# Patient Record
Sex: FEMALE | Race: WHITE | NOT HISPANIC OR LATINO | Employment: FULL TIME | URBAN - METROPOLITAN AREA
[De-identification: names, ages, dates, MRNs, and addresses within clinical notes are randomized per-mention and may not be internally consistent; named-entity substitution may affect disease eponyms.]

---

## 2017-01-19 ENCOUNTER — GENERIC CONVERSION - ENCOUNTER (OUTPATIENT)
Dept: OTHER | Facility: OTHER | Age: 32
End: 2017-01-19

## 2017-02-02 ENCOUNTER — GENERIC CONVERSION - ENCOUNTER (OUTPATIENT)
Dept: OTHER | Facility: OTHER | Age: 32
End: 2017-02-02

## 2017-02-06 ENCOUNTER — TRANSCRIBE ORDERS (OUTPATIENT)
Dept: ADMINISTRATIVE | Facility: HOSPITAL | Age: 32
End: 2017-02-06

## 2017-02-06 ENCOUNTER — HOSPITAL ENCOUNTER (OUTPATIENT)
Dept: RADIOLOGY | Facility: HOSPITAL | Age: 32
Discharge: HOME | End: 2017-02-06
Attending: OBSTETRICS & GYNECOLOGY
Payer: COMMERCIAL

## 2017-02-06 DIAGNOSIS — R60.0 EDEMA OF LEFT LOWER EXTREMITY: Primary | ICD-10-CM

## 2017-02-06 DIAGNOSIS — IMO0001 TWINS: ICD-10-CM

## 2017-02-06 DIAGNOSIS — R60.0 EDEMA OF LEFT LOWER EXTREMITY: ICD-10-CM

## 2017-02-06 PROCEDURE — 93971 EXTREMITY STUDY: CPT

## 2017-02-07 ENCOUNTER — GENERIC CONVERSION - ENCOUNTER (OUTPATIENT)
Dept: OTHER | Facility: OTHER | Age: 32
End: 2017-02-07

## 2017-02-13 ENCOUNTER — GENERIC CONVERSION - ENCOUNTER (OUTPATIENT)
Dept: OTHER | Facility: OTHER | Age: 32
End: 2017-02-13

## 2017-02-15 ENCOUNTER — HOSPITAL ENCOUNTER (OUTPATIENT)
Dept: RADIOLOGY | Facility: HOSPITAL | Age: 32
Discharge: HOME | End: 2017-02-15
Attending: OBSTETRICS & GYNECOLOGY
Payer: COMMERCIAL

## 2017-02-15 DIAGNOSIS — IMO0001 TWINS: ICD-10-CM

## 2017-02-15 PROCEDURE — 76810 OB US >/= 14 WKS ADDL FETUS: CPT

## 2017-02-15 PROCEDURE — 76818 FETAL BIOPHYS PROFILE W/NST: CPT

## 2017-02-15 PROCEDURE — 76805 OB US >/= 14 WKS SNGL FETUS: CPT

## 2017-04-04 ENCOUNTER — ANESTHESIA EVENT (OUTPATIENT)
Dept: PERIOP | Facility: AMBULARY SURGERY CENTER | Age: 32
End: 2017-04-04
Payer: COMMERCIAL

## 2017-04-05 ENCOUNTER — ANESTHESIA (OUTPATIENT)
Dept: PERIOP | Facility: AMBULARY SURGERY CENTER | Age: 32
End: 2017-04-05
Payer: COMMERCIAL

## 2017-05-03 ENCOUNTER — HOSPITAL ENCOUNTER (OUTPATIENT)
Facility: AMBULARY SURGERY CENTER | Age: 32
Setting detail: OUTPATIENT SURGERY
Discharge: HOME | End: 2017-05-03
Attending: OBSTETRICS & GYNECOLOGY | Admitting: OBSTETRICS & GYNECOLOGY
Payer: COMMERCIAL

## 2017-05-03 VITALS
SYSTOLIC BLOOD PRESSURE: 115 MMHG | DIASTOLIC BLOOD PRESSURE: 64 MMHG | HEART RATE: 55 BPM | OXYGEN SATURATION: 95 % | TEMPERATURE: 98 F | RESPIRATION RATE: 20 BRPM

## 2017-05-03 RX ORDER — FENTANYL CITRATE 50 UG/ML
INJECTION, SOLUTION INTRAMUSCULAR; INTRAVENOUS AS NEEDED
Status: DISCONTINUED | OUTPATIENT
Start: 2017-05-03 | End: 2017-05-03 | Stop reason: SURG

## 2017-05-03 RX ORDER — PROPOFOL 10 MG/ML
INJECTION, EMULSION INTRAVENOUS AS NEEDED
Status: DISCONTINUED | OUTPATIENT
Start: 2017-05-03 | End: 2017-05-03 | Stop reason: SURG

## 2017-05-03 RX ORDER — LIDOCAINE HYDROCHLORIDE 10 MG/ML
INJECTION, SOLUTION INFILTRATION; PERINEURAL AS NEEDED
Status: DISCONTINUED | OUTPATIENT
Start: 2017-05-03 | End: 2017-05-03 | Stop reason: SURG

## 2017-05-03 RX ORDER — SODIUM CHLORIDE, SODIUM LACTATE, POTASSIUM CHLORIDE, CALCIUM CHLORIDE 600; 310; 30; 20 MG/100ML; MG/100ML; MG/100ML; MG/100ML
125 INJECTION, SOLUTION INTRAVENOUS CONTINUOUS
Status: DISCONTINUED | OUTPATIENT
Start: 2017-05-03 | End: 2017-05-03 | Stop reason: HOSPADM

## 2017-05-03 RX ORDER — FENTANYL CITRATE/PF 50 MCG/ML
25 SYRINGE (ML) INJECTION
Status: CANCELLED | OUTPATIENT
Start: 2017-05-03

## 2017-05-03 RX ORDER — SODIUM CHLORIDE, SODIUM LACTATE, POTASSIUM CHLORIDE, CALCIUM CHLORIDE 600; 310; 30; 20 MG/100ML; MG/100ML; MG/100ML; MG/100ML
125 INJECTION, SOLUTION INTRAVENOUS CONTINUOUS
Status: CANCELLED | OUTPATIENT
Start: 2017-05-03

## 2017-05-03 RX ORDER — ONDANSETRON 2 MG/ML
4 INJECTION INTRAMUSCULAR; INTRAVENOUS ONCE
Status: CANCELLED | OUTPATIENT
Start: 2017-05-03 | End: 2017-05-03

## 2017-05-03 RX ORDER — ONDANSETRON 2 MG/ML
INJECTION INTRAMUSCULAR; INTRAVENOUS AS NEEDED
Status: DISCONTINUED | OUTPATIENT
Start: 2017-05-03 | End: 2017-05-03 | Stop reason: SURG

## 2017-05-03 RX ADMIN — PROPOFOL 200 MG: 10 INJECTION, EMULSION INTRAVENOUS at 09:47

## 2017-05-03 RX ADMIN — FENTANYL CITRATE 100 MCG: 50 INJECTION, SOLUTION INTRAMUSCULAR; INTRAVENOUS at 09:47

## 2017-05-03 RX ADMIN — DEXAMETHASONE SODIUM PHOSPHATE 8 MG: 10 INJECTION INTRAMUSCULAR; INTRAVENOUS at 09:52

## 2017-05-03 RX ADMIN — ONDANSETRON 4 MG: 2 INJECTION INTRAMUSCULAR; INTRAVENOUS at 09:54

## 2017-05-03 RX ADMIN — SODIUM CHLORIDE, SODIUM LACTATE, POTASSIUM CHLORIDE, AND CALCIUM CHLORIDE 125 ML/HR: .6; .31; .03; .02 INJECTION, SOLUTION INTRAVENOUS at 08:58

## 2017-05-03 RX ADMIN — LIDOCAINE HYDROCHLORIDE 50 MG: 10 INJECTION, SOLUTION INFILTRATION; PERINEURAL at 09:47

## 2017-08-07 ENCOUNTER — ALLSCRIPTS OFFICE VISIT (OUTPATIENT)
Dept: OTHER | Facility: OTHER | Age: 32
End: 2017-08-07

## 2017-08-07 DIAGNOSIS — R63.5 ABNORMAL WEIGHT GAIN: ICD-10-CM

## 2017-08-07 DIAGNOSIS — D64.9 ANEMIA: ICD-10-CM

## 2017-08-07 DIAGNOSIS — R53.83 OTHER FATIGUE: ICD-10-CM

## 2017-08-07 DIAGNOSIS — R42 DIZZINESS AND GIDDINESS: ICD-10-CM

## 2017-08-09 ENCOUNTER — GENERIC CONVERSION - ENCOUNTER (OUTPATIENT)
Dept: OTHER | Facility: OTHER | Age: 32
End: 2017-08-09

## 2017-08-09 LAB
DEPRECATED RDW RBC AUTO: 15.6 % (ref 12.3–15.4)
HCT VFR BLD AUTO: 38.6 % (ref 34–46.6)
HGB BLD-MCNC: 12.3 G/DL (ref 11.1–15.9)
MCH RBC QN AUTO: 24.8 PG (ref 26.6–33)
MCHC RBC AUTO-ENTMCNC: 31.9 G/DL (ref 31.5–35.7)
MCV RBC AUTO: 78 FL (ref 79–97)
PLATELET # BLD AUTO: 183 X10E3/UL (ref 150–379)
RBC (HISTORICAL): 4.96 X10E6/UL (ref 3.77–5.28)
WBC # BLD AUTO: 6.4 X10E3/UL (ref 3.4–10.8)

## 2017-08-10 LAB — TSH SERPL DL<=0.05 MIU/L-ACNC: 2.4 UIU/ML (ref 0.45–4.5)

## 2017-08-17 ENCOUNTER — GENERIC CONVERSION - ENCOUNTER (OUTPATIENT)
Dept: OTHER | Facility: OTHER | Age: 32
End: 2017-08-17

## 2017-11-07 ENCOUNTER — HOSPITAL ENCOUNTER (EMERGENCY)
Facility: HOSPITAL | Age: 32
Discharge: HOME | End: 2017-11-07
Attending: EMERGENCY MEDICINE | Admitting: EMERGENCY MEDICINE
Payer: COMMERCIAL

## 2017-11-07 VITALS
RESPIRATION RATE: 16 BRPM | HEIGHT: 66 IN | TEMPERATURE: 98.8 F | WEIGHT: 250 LBS | SYSTOLIC BLOOD PRESSURE: 126 MMHG | HEART RATE: 64 BPM | OXYGEN SATURATION: 97 % | DIASTOLIC BLOOD PRESSURE: 64 MMHG | BODY MASS INDEX: 40.18 KG/M2

## 2017-11-07 DIAGNOSIS — S05.00XA CORNEAL ABRASION: Primary | ICD-10-CM

## 2017-11-07 PROCEDURE — 99283 EMERGENCY DEPT VISIT LOW MDM: CPT

## 2017-11-07 RX ORDER — CIPROFLOXACIN HYDROCHLORIDE 3.5 MG/ML
1 SOLUTION/ DROPS TOPICAL EVERY 4 HOURS
Qty: 2.5 ML | Refills: 0 | Status: SHIPPED | OUTPATIENT
Start: 2017-11-07 | End: 2017-11-14

## 2017-11-07 RX ORDER — KETOROLAC TROMETHAMINE 5 MG/ML
1 SOLUTION OPHTHALMIC 4 TIMES DAILY PRN
Qty: 5 ML | Refills: 0 | Status: SHIPPED | OUTPATIENT
Start: 2017-11-07 | End: 2019-11-26

## 2017-11-07 RX ORDER — TETRACAINE HYDROCHLORIDE 5 MG/ML
2 SOLUTION OPHTHALMIC ONCE
Status: COMPLETED | OUTPATIENT
Start: 2017-11-07 | End: 2017-11-07

## 2017-11-07 RX ADMIN — TETRACAINE HYDROCHLORIDE 2 DROP: 5 SOLUTION OPHTHALMIC at 09:57

## 2017-11-07 RX ADMIN — FLUORESCEIN SODIUM 1 STRIP: 1 STRIP OPHTHALMIC at 09:58

## 2017-11-07 NOTE — DISCHARGE INSTRUCTIONS
Corneal Abrasion   WHAT YOU NEED TO KNOW:   A corneal abrasion is a scratch on the cornea of your eye  The cornea is the clear layer that covers the front of your eye  A small scratch may heal in 1 to 2 days  Deeper or larger scratches may take longer to heal         DISCHARGE INSTRUCTIONS:   Contact your healthcare provider if:   · Your eye pain or vision gets worse  · You have yellow or green drainage from your eye  · You have questions or concerns about your condition or care  Medicines:   · Medicines  may be given in the form of eyedrops or ointment to help prevent an eye infection  You may also be given eye drops to decrease pain  Ask how to take this medicine safely  · Take your medicine as directed  Contact your healthcare provider if you think your medicine is not helping or if you have side effects  Tell him or her if you are allergic to any medicine  Keep a list of the medicines, vitamins, and herbs you take  Include the amounts, and when and why you take them  Bring the list or the pill bottles to follow-up visits  Carry your medicine list with you in case of an emergency  Follow up with your healthcare provider as directed:  Write down your questions so you remember to ask them during your visits  Self-care:   · Do not touch or rub your eye  · Ask your healthcare provider when you can start your normal activities  · Ask your healthcare provider when you can wear your contact lenses  · Wear sunglasses in bright light until your eyes feel better  Help prevent corneal abrasions:   · Remove your contact lenses if your eyes feel dry or irritated  · Wash your hands if you need to touch your eyes or your face  · Trim your child's fingernails so he cannot scratch his eye  · Wear protective eyewear when you work with chemicals, wood, dust, or metal      · Wear protective eyewear when you play sports  · Do not wear your contacts for longer than you should       · Do not wear colored lenses or lenses with shapes on them  These lenses may cause eye damage and vision loss  · Do not wear glitter makeup  Glitter can easily get into your eyes and under contact lenses  · Do not sleep with your contacts  © 2017 [address] [employer] Information is for End User's use only and may not be sold, redistributed or otherwise used for commercial purposes  All illustrations and images included in CareNotes® are the copyrighted property of [employer] , [employer]  or Helder Collado  The above information is an  only  It is not intended as medical advice for individual conditions or treatments  Talk to your doctor, nurse or pharmacist before following any medical regimen to see if it is safe and effective for you

## 2017-11-07 NOTE — ED PROVIDER NOTES
disturbance  Respiratory: Negative for cough, chest tightness, shortness of breath and wheezing  Cardiovascular: Negative for chest pain  Gastrointestinal: Negative for abdominal pain, diarrhea, nausea and vomiting  Genitourinary: Negative  Musculoskeletal: Negative for myalgias and neck stiffness  Skin: Negative for color change, pallor and rash  Neurological: Negative for dizziness, loss of consciousness, weakness, light-headedness, numbness and headaches  Hematological: Negative for adenopathy  Physical Exam  ED Triage Vitals [11/07/17 0806]   Temperature Pulse Respirations Blood Pressure SpO2   98 8 °F (37 1 °C) 64 16 126/64 97 %      Temp Source Heart Rate Source Patient Position - Orthostatic VS BP Location FiO2 (%)   Oral Monitor Sitting Left arm --      Pain Score       7           Orthostatic Vital Signs  Vitals:    11/07/17 0806   BP: 126/64   Pulse: 64   Patient Position - Orthostatic VS: Sitting       Physical Exam   Constitutional: She is oriented to person, place, and time  She appears well-developed and well-nourished  No distress  HENT:   Head: Normocephalic and atraumatic  Right Ear: External ear normal    Left Ear: External ear normal    Nose: Nose normal    Eyes: EOM and lids are normal  Pupils are equal, round, and reactive to light  Lids are everted and swept, no foreign bodies found  No foreign body present in the right eye  No foreign body present in the left eye  Right conjunctiva is not injected  Right conjunctiva has no hemorrhage  Left conjunctiva is injected  Left conjunctiva has no hemorrhage  Slit lamp exam:       The left eye shows corneal abrasion  The left eye shows no corneal ulcer, no foreign body, no hyphema and no anterior chamber bulge  Neck: Normal range of motion  Neck supple  Cardiovascular: Normal rate, regular rhythm, normal heart sounds and intact distal pulses  Exam reveals no friction rub  No murmur heard    Pulmonary/Chest: Effort normal and breath sounds normal  No respiratory distress  She has no wheezes  Lymphadenopathy:     She has no cervical adenopathy  Neurological: She is alert and oriented to person, place, and time  She exhibits normal muscle tone  Coordination normal    Skin: Skin is warm and dry  Capillary refill takes less than 2 seconds  No rash noted  She is not diaphoretic  No pallor  Nursing note and vitals reviewed  ED Medications  Medications   tetracaine 0 5 % ophthalmic solution 2 drop (2 drops Left Eye Given 11/7/17 1263)   fluorescein sodium sterile 1 mg ophthalmic strip 1 strip (1 strip Left Eye Given 11/7/17 5782)       Diagnostic Studies  Results Reviewed     None                 No orders to display              Procedures  Procedures       Phone Contacts  ED Phone Contact    ED Course  ED Course                                MDM  Number of Diagnoses or Management Options  Corneal abrasion:   Diagnosis management comments: Fluorescein staining showed mild corneal abrasion at the 1 o'clock position  The patient was discharged in no acute distress with Cipro eyedrops, ketorolac drops for pain, and instructed to follow up with her PCP if no improvement or return to the ED worsening symptoms develop  Amount and/or Complexity of Data Reviewed  Review and summarize past medical records: yes      CritCare Time    Disposition  Final diagnoses:   Corneal abrasion - Left eye     Time reflects when diagnosis was documented in both MDM as applicable and the Disposition within this note     Time User Action Codes Description Comment    11/7/2017 10:15 AM Monsivaisdelia Santamaria Add [S05 00XA] Corneal abrasion     11/7/2017 10:15 AM Monsivaisdelia Santamaria Modify [S05 00XA] Corneal abrasion Left eye      ED Disposition     ED Disposition Condition Comment    Discharge  [facility] discharge to home/self care      Condition at discharge: Stable        Follow-up Information     Follow up With Specialties Details Why Contact Info Additional [address] Emergency Department Emergency Medicine Go to If symptoms worsen [address] [place] [address]  [phone] [facility] ED, Leanne Madison, Saul hernandes, Malcom Sánchez [address], DO Family Medicine Go to As needed for follow-up [address]   [phone]           Discharge Medication List as of 11/7/2017 10:18 AM      START taking these medications    Details   ciprofloxacin (CILOXAN) 0 3 % ophthalmic solution Administer 1 drop into the left eye every 4 (four) hours for 7 days, Starting Tue 11/7/2017, Until Tue 11/14/2017, Print      ketorolac (ACULAR) 0 5 % ophthalmic solution Administer 1 drop into the left eye 4 (four) times a day as needed (As needed for eye pain) for up to 5 days, Starting Tue 11/7/2017, Until Sun 11/12/2017, Print         CONTINUE these medications which have NOT CHANGED    Details   acetaminophen (TYLENOL) 325 mg tablet Take 650 mg by mouth every 6 (six) hours as needed for mild pain, Until Discontinued, Historical Med           No discharge procedures on file      ED Provider  Electronically Signed by           Melissa Goodwin PA-C  11/07/17 5602

## 2017-12-11 ENCOUNTER — ALLSCRIPTS OFFICE VISIT (OUTPATIENT)
Dept: OTHER | Facility: OTHER | Age: 32
End: 2017-12-11

## 2017-12-13 NOTE — PROGRESS NOTES
Assessment    1  Need for influenza vaccination (V04 81) (Z23)   2  Acute ethmoidal sinusitis (461 2) (J01 20)   3  Viral sinusitis (473 9,118 99) (J32 9,D49 89)    Plan  Need for influenza vaccination    · Stop: Influenza  PMH: Viral URI with cough    · Fluticasone Propionate 50 MCG/ACT Nasal Suspension (Flonase Allergy Relief);instill 1 to 2 sprays into each nostril once daily    Discussion/Summary    2-y/o female presents for evaluation a productive cough, congestion, headache  ethmoidal sinusitis: Likely viral in etiology  Patient has had symptoms for the past 2 weeks  Patient has 5 children were in various stages of viral illnesses at home  Postnasal drip is likely cause of persistent nonproductive cough  Advised symptomatic relief  Patient to stay well hydrated  Recommended Pedialyte  Will order Flonase for congestion  Recommended NyQuil and cough drops for cough relief  Patient can use Tylenol or Motrin p r n  for fever and/or pain/discomfort  Common course of viral sinusitis reviewed with patient in office today  Patient should return to clinic for any worsening of condition  declines influenza vaccine at this time  Possible side effects of new medications were reviewed with the patient/guardian today  The treatment plan was reviewed with the patient/guardian  The patient/guardian understands and agrees with the treatment plan     Self Referrals: No      Chief Complaint  patient presents with a cough that started 2 weeks ago  Chest congestion and headache as well  History of Present Illness  HPI: 32-y/o female presents with 2 weeks of nonproductive cough, congestion, and headache  Patient states she has taken Robitussin without much relief  Patient states she has 5 children at home all with various coughs and cold like symptoms  Review of Systems   Constitutional: no fever-- and-- no chills  ENT: nasal discharge, but-- no sore throat-- and-- no hoarseness    Cardiovascular: no chest pain-- and-- no palpitations  Respiratory: cough, but-- no shortness of breath-- and-- no wheezing  Gastrointestinal: no nausea,-- no vomiting,-- no constipation-- and-- no diarrhea  Neurological: headache  ROS reviewed  Active Problems  1  Acute ethmoidal sinusitis (461 2) (J01 20)   2  Anemia (285 9) (D64 9)   3  Anxiety (300 00) (F41 9)   4  Asthma (493 90) (J45 909)   5  Benign neoplasm of skin of trunk (216 5) (D23 5)   6  BMI 40 0-44 9, adult (V85 41) (Z68 41)   7  Contraceptive management (V25 9) (Z30 9)   8  Dysmenorrhea (625 3) (N94 6)   9  Fatigue (780 79) (R53 83)   10  History of allergy (V15 09) (Z88 9)   11  Morbid or severe obesity due to excess calories (278 01) (E66 01)   12  Screening for depression (V79 0) (Z13 89)   13  Tinea versicolor (111 0) (B36 0)    Past Medical History  1  History of Acute ethmoidal sinusitis (461 2) (J01 20)   2  History of Acute upper respiratory infection (465 9) (J06 9)   3  History of Benign neoplasm of skin of trunk (216 5) (D23 5)   4  History of acute pharyngitis (V12 69) (Z87 09)   5  History of allergic rhinitis (V12 69) (Z87 09)   6  History of vaginitis (V13 29) (Z87 42)   7  History of Laceration Of Foot (892 0)   8  History of Routine Gynecological Exam With Cervical Pap Smear (V72 31)   9  History of Skin tag (701 9) (L91 8)   10  History of Tinea versicolor (111 0) (B36 0)  Active Problems And Past Medical History Reviewed: The active problems and past medical history were reviewed and updated today  Family History  Mother    1  Family history of Diabetes mellitus, undetermined   2  Family history of Heart disease  Father    3  Family history of Aneurysm   4  Family history of COPD (chronic obstructive pulmonary disease)   5  Family history of Kidney disease  Family History Reviewed: The family history was reviewed and updated today         Social History   · Denied: History of Drug use   · Never a smoker   · Non-smoker (V49 89) (Z78 9)  The social history was reviewed and updated today  The social history was reviewed and is unchanged  Surgical History    1  Contraceptive management (V25 9) (Z30 9)   2  History of Tonsillectomy  Surgical History Reviewed: The surgical history was reviewed and updated today  Current Meds   1  Ferrous Sulfate 325 (65 Fe) MG Oral Tablet; TAKE 1 TABLET DAILY WITH FOOD; Therapy: [id number] to (Evaluate:16Mar2018)  Requested for: 92Fim2309; Last Rx:[id number] Ordered   2  Fluticasone Propionate 50 MCG/ACT Nasal Suspension; USE 1 SPRAY IN EACH NOSTRIL DAILY; Therapy: [id number] to (Last Rx:25Mar2015)  Requested for: 25Mar2015 Ordered   3  ProAir  (90 Base) MCG/ACT Inhalation Aerosol Solution; INHALE 1 PUFF EVERY 4 HOURS AS NEEDED  FOR SHORTNESS OF BREATH  Requested for: 18ADH0636; Last Rx:[id number] Ordered    The medication list was reviewed and updated today  Allergies  1  Amoxicillin CAPS  2  No Known Food Allergies   3  No Known Latex Allergies    Vitals   Recorded: 37EBG0771 03:52PM   Temperature 99 2 F   Heart Rate 68   Respiration 16   Systolic 963   Diastolic 82   Height 5 ft 6 in   Weight 261 lb    BMI Calculated 42 13   BSA Calculated 2 24       Physical Exam   Constitutional  General appearance: No acute distress, well appearing and well nourished  Eyes  Conjunctiva and lids: No swelling, erythema or discharge  Ears, Nose, Mouth, and Throat  External inspection of ears and nose: Normal    Nasal mucosa, septum, and turbinates: Abnormal   There was clear rhinorrhea from both nares  The bilateral nasal mucosa was boggy-- and-- red  Oropharynx: Normal with no erythema, edema, exudate or lesions  -- Postnasal drip  Pulmonary  Respiratory effort: No increased work of breathing or signs of respiratory distress  Auscultation of lungs: Clear to auscultation  Cardiovascular  Palpation of heart: Normal PMI, no thrills  Lymphatic  Palpation of lymph nodes in neck: No lymphadenopathy     Psychiatric Orientation to person, place, and time: Normal    Mood and affect: Normal    Additional Exam:  Sinus: Tenderness to palpation along the ethmoid sinus bilaterally  Attending Note  Attending Note Edu Rubio: Attending Note: I discussed the case with the Resident and reviewed the Resident's note  Level of Participation: I was present in clinic, but did not examine the patient  Signatures   Electronically signed by : Addison Sarah MD; Dec 11 2017  8:42PM EST                       (Author)    Electronically signed by :  FABIANA Archer ; Dec 12 2017  8:52AM EST                       (Author)

## 2018-01-14 VITALS
HEART RATE: 74 BPM | TEMPERATURE: 98.2 F | HEIGHT: 65 IN | SYSTOLIC BLOOD PRESSURE: 126 MMHG | OXYGEN SATURATION: 99 % | DIASTOLIC BLOOD PRESSURE: 78 MMHG | BODY MASS INDEX: 41.66 KG/M2 | RESPIRATION RATE: 20 BRPM | WEIGHT: 250.06 LBS

## 2018-01-17 ENCOUNTER — GENERIC CONVERSION - ENCOUNTER (OUTPATIENT)
Dept: OTHER | Facility: OTHER | Age: 33
End: 2018-01-17

## 2018-01-18 NOTE — RESULT NOTES
Discussion/Summary  Called patient to discuss results  WIll get iron panel and retic count and patient will  paper  WIll send over ferrous sulfate  Agrees with plan and will follow up in 1-2 months  Verified Results  (1) CBC/ PLT (NO DIFF) [id number] 07:48AM Shavon Lopez     Test Name Result Flag Reference   WBC 6 4 x10E3/uL  3 4-10 8   RBC 4 96 x10E6/uL  3 77-5 28   Hemoglobin 12 3 g/dL  11 1-15 9   Hematocrit 38 6 %  34 0-46  6   MCV 78 fL L 79-97   MCH 24 8 pg L 26 6-33 0   MCHC 31 9 g/dL  31 5-35 7   RDW 15 6 % H 12 3-15 4   Platelets 769 L64O3/DX  150-379     (LC) TSH Rfx on Abnormal to Free T4 [id number] 07:48AM Shavon John     Test Name Result Flag Reference   TSH 2 400 uIU/mL  0 450-4 500       Signatures   Electronically signed by : Matt Michelle MD; Aug 17 2017  8:39AM EST                       (Author)

## 2018-01-22 VITALS
SYSTOLIC BLOOD PRESSURE: 120 MMHG | HEIGHT: 66 IN | DIASTOLIC BLOOD PRESSURE: 82 MMHG | WEIGHT: 261 LBS | RESPIRATION RATE: 16 BRPM | TEMPERATURE: 99.2 F | HEART RATE: 68 BPM | BODY MASS INDEX: 41.95 KG/M2

## 2018-01-23 NOTE — MISCELLANEOUS
Message  Return to work or school:   Yakov Cota is under my professional care   She was seen in my office on 12/11/2017       Dr Sofia Ray MD         Signatures   Electronically signed by : Angie Corrigan MD; Dec 15 2017  9:27AM EST                       (Author)

## 2018-01-24 VITALS
SYSTOLIC BLOOD PRESSURE: 110 MMHG | HEIGHT: 66 IN | TEMPERATURE: 98.9 F | HEART RATE: 84 BPM | OXYGEN SATURATION: 98 % | DIASTOLIC BLOOD PRESSURE: 70 MMHG | RESPIRATION RATE: 18 BRPM

## 2018-04-05 DIAGNOSIS — J30.9 ALLERGIC RHINITIS, UNSPECIFIED SEASONALITY, UNSPECIFIED TRIGGER: Primary | ICD-10-CM

## 2018-04-05 RX ORDER — FLUTICASONE PROPIONATE 50 MCG
1-2 SPRAY, SUSPENSION (ML) NASAL DAILY
COMMUNITY
Start: 2017-12-11 | End: 2018-04-05 | Stop reason: SDUPTHER

## 2018-04-10 RX ORDER — FLUTICASONE PROPIONATE 50 MCG
1-2 SPRAY, SUSPENSION (ML) NASAL DAILY
Qty: 16 G | Refills: 5 | Status: SHIPPED | OUTPATIENT
Start: 2018-04-10 | End: 2018-12-17

## 2018-04-11 ENCOUNTER — OFFICE VISIT (OUTPATIENT)
Dept: FAMILY MEDICINE CLINIC | Facility: CLINIC | Age: 33
End: 2018-04-11
Payer: COMMERCIAL

## 2018-04-11 VITALS
HEIGHT: 66 IN | BODY MASS INDEX: 42.91 KG/M2 | OXYGEN SATURATION: 100 % | SYSTOLIC BLOOD PRESSURE: 112 MMHG | WEIGHT: 267 LBS | RESPIRATION RATE: 18 BRPM | TEMPERATURE: 98.6 F | HEART RATE: 83 BPM | DIASTOLIC BLOOD PRESSURE: 74 MMHG

## 2018-04-11 DIAGNOSIS — D22.9 MULTIPLE BENIGN NEVI: ICD-10-CM

## 2018-04-11 DIAGNOSIS — M77.8 LEFT WRIST TENDONITIS: ICD-10-CM

## 2018-04-11 DIAGNOSIS — R60.9 PERIPHERAL EDEMA: Primary | ICD-10-CM

## 2018-04-11 PROCEDURE — 3008F BODY MASS INDEX DOCD: CPT | Performed by: FAMILY MEDICINE

## 2018-04-11 PROCEDURE — 99213 OFFICE O/P EST LOW 20 MIN: CPT | Performed by: FAMILY MEDICINE

## 2018-04-11 PROCEDURE — 1036F TOBACCO NON-USER: CPT | Performed by: FAMILY MEDICINE

## 2018-04-11 RX ORDER — HYDROCHLOROTHIAZIDE 12.5 MG/1
12.5 TABLET ORAL DAILY
Qty: 30 TABLET | Refills: 5 | Status: SHIPPED | OUTPATIENT
Start: 2018-04-11 | End: 2019-04-03

## 2018-04-11 RX ORDER — FERROUS SULFATE 325(65) MG
325 TABLET ORAL
COMMUNITY
End: 2019-08-19 | Stop reason: ALTCHOICE

## 2018-04-11 NOTE — PATIENT INSTRUCTIONS
Patient was told that her  nevi are benign there is no treatment needed at this time  The patient was prescribed hydrochlorothiazide 12 5 mg to be taken daily for peripheral edema  The patient will continue to avoid salt in her diet and has limited soda also  The patient was advised to try over-the-counter Advil or Aleve for her left wrist tendinitis  Patient will follow up with me with any continued problems   All

## 2018-04-11 NOTE — PROGRESS NOTES
Assessment/Plan:    No problem-specific Assessment & Plan notes found for this encounter  Diagnoses and all orders for this visit:    Peripheral edema  -     hydrochlorothiazide (HYDRODIURIL) 12 5 mg tablet; Take 1 tablet (12 5 mg total) by mouth daily    Left wrist tendonitis    Multiple benign nevi    Other orders  -     ferrous sulfate 325 (65 Fe) mg tablet; Take 325 mg by mouth daily with breakfast        Subjective:      Patient ID: Chhaya Gerardo is a 27 y o  female      HPI    The following portions of the patient's history were reviewed and updated as appropriate: allergies, current medications, past family history, past medical history, past social history, past surgical history and problem list     Review of Systems      Objective:      /74 (BP Location: Right arm, Patient Position: Sitting, Cuff Size: Extra-Large)   Pulse 83   Temp 98 6 °F (37 °C) (Tympanic)   Resp 18   Ht 5' 6" (1 676 m)   Wt 121 kg (267 lb)   SpO2 100%   BMI 43 09 kg/m²          Physical Exam

## 2018-04-11 NOTE — PROGRESS NOTES
Assessment/Plan:    No problem-specific Assessment & Plan notes found for this encounter  Diagnoses and all orders for this visit:    Peripheral edema  -     hydrochlorothiazide (HYDRODIURIL) 12 5 mg tablet; Take 1 tablet (12 5 mg total) by mouth daily    Left wrist tendonitis    Multiple benign nevi    Other orders  -     ferrous sulfate 325 (65 Fe) mg tablet; Take 325 mg by mouth daily with breakfast        Subjective:      Patient ID: Seven White is a 27 y o  female  This is a 27-year-old female complaints of left wrist pain  Pain gets worse with lifting  Patient also has several nevi that she would like to be checked  Patient also states that she has swelling in her ankles  The patient states she is trying to lose weight and has cut back on her salt and stops drinking soda  She denies any other complaints today      Wrist Pain              Review of Systems   Constitutional: Negative  Respiratory: Negative  Cardiovascular: Positive for leg swelling (All)  Musculoskeletal:        Patient has left wrist pain proximal to her thumb  Psychiatric/Behavioral: Negative  Objective:      /74 (BP Location: Right arm, Patient Position: Sitting, Cuff Size: Extra-Large)   Pulse 83   Temp 98 6 °F (37 °C) (Tympanic)   Resp 18   Ht 5' 6" (1 676 m)   Wt 121 kg (267 lb)   SpO2 100%   BMI 43 09 kg/m²          Physical Exam   Constitutional: She appears well-developed and well-nourished  Patient is morbidly obese with a BMI 43 09   Cardiovascular: Normal rate and regular rhythm  Pulmonary/Chest: Effort normal and breath sounds normal    Musculoskeletal:   Patient has tenderness proximal to her left home in the snuffbox area but not painful to palpation  tonight  There is no swelling in the proximal thumb area   Skin:   Patient has multiple benign-appearing nevi 1 on the top of her head and otherwise on her right shoulder    There are multiple other nevi scattered throughout her trunk  All the nevi are benign   Vitals reviewed

## 2018-12-09 DIAGNOSIS — I10 ESSENTIAL HYPERTENSION: Primary | ICD-10-CM

## 2018-12-09 RX ORDER — HYDROCHLOROTHIAZIDE 12.5 MG/1
CAPSULE, GELATIN COATED ORAL
Qty: 30 CAPSULE | Refills: 5 | Status: SHIPPED | OUTPATIENT
Start: 2018-12-09 | End: 2019-04-03

## 2018-12-17 ENCOUNTER — OFFICE VISIT (OUTPATIENT)
Dept: FAMILY MEDICINE CLINIC | Facility: CLINIC | Age: 33
End: 2018-12-17
Payer: COMMERCIAL

## 2018-12-17 VITALS
WEIGHT: 243 LBS | TEMPERATURE: 98.6 F | BODY MASS INDEX: 39.22 KG/M2 | DIASTOLIC BLOOD PRESSURE: 90 MMHG | RESPIRATION RATE: 18 BRPM | OXYGEN SATURATION: 98 % | HEART RATE: 92 BPM | SYSTOLIC BLOOD PRESSURE: 130 MMHG

## 2018-12-17 DIAGNOSIS — J01.40 ACUTE NON-RECURRENT PANSINUSITIS: Primary | ICD-10-CM

## 2018-12-17 PROCEDURE — 99213 OFFICE O/P EST LOW 20 MIN: CPT | Performed by: NURSE PRACTITIONER

## 2018-12-17 PROCEDURE — 1036F TOBACCO NON-USER: CPT | Performed by: NURSE PRACTITIONER

## 2018-12-17 RX ORDER — FLUTICASONE PROPIONATE 50 MCG
2 SPRAY, SUSPENSION (ML) NASAL DAILY
Qty: 16 G | Refills: 0 | Status: SHIPPED | OUTPATIENT
Start: 2018-12-17 | End: 2020-06-30

## 2018-12-17 RX ORDER — AZITHROMYCIN 250 MG/1
TABLET, FILM COATED ORAL
Qty: 6 TABLET | Refills: 0 | Status: SHIPPED | OUTPATIENT
Start: 2018-12-17 | End: 2018-12-21

## 2018-12-17 NOTE — PROGRESS NOTES
Assessment/Plan: 1 take medications as rx  2  F/u if condition changes/worsens         Diagnoses and all orders for this visit:    Acute non-recurrent pansinusitis  -     fluticasone (FLONASE) 50 mcg/act nasal spray; 2 sprays into each nostril daily  -     azithromycin (ZITHROMAX) 250 mg tablet; Take 2 tablets today then 1 tablet daily x 4 days          Subjective:      Patient ID: Carlee Villar is a 26 y o  female  27yo F presents with sinus pressure for 21 month  Reports a lot of congestion  Sinus headache  Denies fever  Took OTC  Not helping        The following portions of the patient's history were reviewed and updated as appropriate: allergies and current medications  Review of Systems   Constitutional: Negative  HENT: Positive for congestion, sinus pain and sinus pressure  Respiratory: Negative  Cardiovascular: Negative  Objective:      /90 (BP Location: Left arm, Patient Position: Sitting, Cuff Size: Large)   Pulse 92   Temp 98 6 °F (37 °C) (Tympanic)   Resp 18   Wt 110 kg (243 lb)   SpO2 98%   BMI 39 22 kg/m²          Physical Exam   Constitutional: She appears well-developed and well-nourished  HENT:   Head: Normocephalic and atraumatic  Right Ear: External ear normal    Left Ear: External ear normal    Nose: Right sinus exhibits maxillary sinus tenderness and frontal sinus tenderness  Left sinus exhibits maxillary sinus tenderness and frontal sinus tenderness  Mouth/Throat: Oropharynx is clear and moist    Cardiovascular: Normal rate, regular rhythm and normal heart sounds      Pulmonary/Chest: Effort normal and breath sounds normal

## 2019-01-18 ENCOUNTER — CLINICAL SUPPORT (OUTPATIENT)
Dept: FAMILY MEDICINE CLINIC | Facility: CLINIC | Age: 34
End: 2019-01-18
Payer: COMMERCIAL

## 2019-01-18 DIAGNOSIS — Z11.1 ENCOUNTER FOR PPD TEST: Primary | ICD-10-CM

## 2019-01-18 PROCEDURE — 86580 TB INTRADERMAL TEST: CPT | Performed by: FAMILY MEDICINE

## 2019-01-21 ENCOUNTER — CLINICAL SUPPORT (OUTPATIENT)
Dept: FAMILY MEDICINE CLINIC | Facility: CLINIC | Age: 34
End: 2019-01-21
Payer: COMMERCIAL

## 2019-01-21 DIAGNOSIS — Z11.1 ENCOUNTER FOR PPD SKIN TEST READING: Primary | ICD-10-CM

## 2019-01-21 LAB
INDURATION: NORMAL MM
TB SKIN TEST: NEGATIVE

## 2019-01-21 PROCEDURE — 99214 OFFICE O/P EST MOD 30 MIN: CPT | Performed by: FAMILY MEDICINE

## 2019-03-06 ENCOUNTER — OFFICE VISIT (OUTPATIENT)
Dept: FAMILY MEDICINE CLINIC | Facility: CLINIC | Age: 34
End: 2019-03-06
Payer: COMMERCIAL

## 2019-03-06 VITALS
DIASTOLIC BLOOD PRESSURE: 82 MMHG | HEIGHT: 66 IN | WEIGHT: 273 LBS | RESPIRATION RATE: 18 BRPM | BODY MASS INDEX: 43.87 KG/M2 | SYSTOLIC BLOOD PRESSURE: 126 MMHG | HEART RATE: 88 BPM

## 2019-03-06 DIAGNOSIS — F41.9 ANXIETY: Primary | ICD-10-CM

## 2019-03-06 DIAGNOSIS — R07.89 ATYPICAL CHEST PAIN: ICD-10-CM

## 2019-03-06 DIAGNOSIS — E66.01 CLASS 3 SEVERE OBESITY DUE TO EXCESS CALORIES WITHOUT SERIOUS COMORBIDITY WITH BODY MASS INDEX (BMI) OF 40.0 TO 44.9 IN ADULT (HCC): ICD-10-CM

## 2019-03-06 PROCEDURE — 3008F BODY MASS INDEX DOCD: CPT | Performed by: FAMILY MEDICINE

## 2019-03-06 PROCEDURE — 99214 OFFICE O/P EST MOD 30 MIN: CPT | Performed by: FAMILY MEDICINE

## 2019-03-07 NOTE — PROGRESS NOTES
Assessment/Plan:    No problem-specific Assessment & Plan notes found for this encounter  Diagnoses and all orders for this visit:    Anxiety    Class 3 severe obesity due to excess calories without serious comorbidity with body mass index (BMI) of 40 0 to 44 9 in adult [facility])  -     Lipid panel; Future  -     Hemoglobin A1C; Future  -     TSH, 3rd generation; Future  -     Lipid panel  -     Hemoglobin A1C  -     TSH, 3rd generation    Atypical chest pain  -     Comprehensive metabolic panel; Future  -     Comprehensive metabolic panel        Subjective:      Patient ID: Marisol MOREIRA is a 26 y o  female  This is a 58-year-old morbidly obese white female who states recent atypical chest pain associated with she thinks is stress  The patient states there is a strained relationship between her and her stepdaughter  The patient denies shortness of breath associated with the atypical chest pain  The patient is also concerned about her weight and would like a referral to dietitian to help with her weight loss  She denies any other complaints  The following portions of the patient's history were reviewed and updated as appropriate: allergies, current medications, past family history, past medical history, past social history, past surgical history and problem list     Review of Systems   Constitutional: Positive for unexpected weight change  Respiratory: Negative  Cardiovascular: Negative  Atypical chest pain that comes on at times of stress  Gastrointestinal: Negative  Endocrine: Negative  Neurological: Negative  Psychiatric/Behavioral: Negative  Objective:      /82   Pulse 88   Resp 18   Ht 5' 6" (1 676 m)   Wt 124 kg (273 lb)   BMI 44 06 kg/m²          Physical Exam   Constitutional: She is oriented to person, place, and time  She appears well-developed and well-nourished     The patient is morbidly obese with a BMI of 44 06   Cardiovascular: Normal rate, regular rhythm, normal heart sounds and intact distal pulses  Pulmonary/Chest: Effort normal and breath sounds normal    Musculoskeletal: Normal range of motion  Neurological: She is alert and oriented to person, place, and time  Skin: Skin is warm  Capillary refill takes less than 2 seconds  Psychiatric: She has a normal mood and affect   Her behavior is normal  Judgment and thought content normal

## 2019-03-07 NOTE — PATIENT INSTRUCTIONS
The patient was diagnosed with situational anxiety which may be responsible for her atypical chest pain  The patient is morbidly obese and screening blood work was ordered for the patient to include a lipid panel, hemoglobin A1c, and a TSH  The patient is a good candidate for referral to a dietitian  I will call the patient with blood work results once they are done

## 2019-03-10 LAB
ALBUMIN SERPL-MCNC: 4.1 G/DL (ref 3.5–5.5)
ALBUMIN/GLOB SERPL: 1.7 {RATIO} (ref 1.2–2.2)
ALP SERPL-CCNC: 69 IU/L (ref 39–117)
ALT SERPL-CCNC: 56 IU/L (ref 0–32)
AST SERPL-CCNC: 16 IU/L (ref 0–40)
BILIRUB SERPL-MCNC: 0.3 MG/DL (ref 0–1.2)
BUN SERPL-MCNC: 11 MG/DL (ref 6–20)
BUN/CREAT SERPL: 14 (ref 9–23)
CALCIUM SERPL-MCNC: 9 MG/DL (ref 8.7–10.2)
CHLORIDE SERPL-SCNC: 104 MMOL/L (ref 96–106)
CHOLEST SERPL-MCNC: 130 MG/DL (ref 100–199)
CHOLEST/HDLC SERPL: 3.2 RATIO (ref 0–4.4)
CO2 SERPL-SCNC: 23 MMOL/L (ref 20–29)
CREAT SERPL-MCNC: 0.81 MG/DL (ref 0.57–1)
EST. AVERAGE GLUCOSE BLD GHB EST-MCNC: 100 MG/DL
GLOBULIN SER-MCNC: 2.4 G/DL (ref 1.5–4.5)
GLUCOSE SERPL-MCNC: 98 MG/DL (ref 65–99)
HBA1C MFR BLD: 5.1 % (ref 4.8–5.6)
HDLC SERPL-MCNC: 41 MG/DL
LDLC SERPL CALC-MCNC: 70 MG/DL (ref 0–99)
POTASSIUM SERPL-SCNC: 4.1 MMOL/L (ref 3.5–5.2)
PROT SERPL-MCNC: 6.5 G/DL (ref 6–8.5)
SL AMB EGFR AFRICAN AMERICAN: 109 ML/MIN/1.73
SL AMB EGFR NON AFRICAN AMERICAN: 94 ML/MIN/1.73
SL AMB VLDL CHOLESTEROL CALC: 19 MG/DL (ref 5–40)
SODIUM SERPL-SCNC: 139 MMOL/L (ref 134–144)
TRIGL SERPL-MCNC: 97 MG/DL (ref 0–149)
TSH SERPL DL<=0.005 MIU/L-ACNC: 2.92 UIU/ML (ref 0.45–4.5)

## 2019-03-10 PROCEDURE — 3044F HG A1C LEVEL LT 7.0%: CPT | Performed by: NURSE PRACTITIONER

## 2019-04-03 ENCOUNTER — OFFICE VISIT (OUTPATIENT)
Dept: FAMILY MEDICINE CLINIC | Facility: CLINIC | Age: 34
End: 2019-04-03
Payer: COMMERCIAL

## 2019-04-03 VITALS
HEART RATE: 63 BPM | OXYGEN SATURATION: 99 % | BODY MASS INDEX: 43.39 KG/M2 | SYSTOLIC BLOOD PRESSURE: 122 MMHG | WEIGHT: 270 LBS | HEIGHT: 66 IN | DIASTOLIC BLOOD PRESSURE: 92 MMHG

## 2019-04-03 DIAGNOSIS — F41.9 ANXIETY: ICD-10-CM

## 2019-04-03 DIAGNOSIS — Z23 INFLUENZA VACCINE ADMINISTERED: ICD-10-CM

## 2019-04-03 DIAGNOSIS — R60.9 PERIPHERAL EDEMA: Primary | ICD-10-CM

## 2019-04-03 DIAGNOSIS — E66.01 CLASS 3 SEVERE OBESITY DUE TO EXCESS CALORIES WITHOUT SERIOUS COMORBIDITY WITH BODY MASS INDEX (BMI) OF 40.0 TO 44.9 IN ADULT (HCC): ICD-10-CM

## 2019-04-03 PROCEDURE — 90471 IMMUNIZATION ADMIN: CPT

## 2019-04-03 PROCEDURE — 3008F BODY MASS INDEX DOCD: CPT | Performed by: FAMILY MEDICINE

## 2019-04-03 PROCEDURE — 99214 OFFICE O/P EST MOD 30 MIN: CPT | Performed by: FAMILY MEDICINE

## 2019-04-03 PROCEDURE — 90686 IIV4 VACC NO PRSV 0.5 ML IM: CPT

## 2019-04-03 RX ORDER — ESCITALOPRAM OXALATE 10 MG/1
10 TABLET ORAL DAILY
Qty: 30 TABLET | Refills: 5 | Status: SHIPPED | OUTPATIENT
Start: 2019-04-03 | End: 2019-09-28 | Stop reason: SDUPTHER

## 2019-04-03 RX ORDER — HYDROCHLOROTHIAZIDE 25 MG/1
25 TABLET ORAL DAILY
Qty: 30 TABLET | Refills: 5 | Status: SHIPPED | OUTPATIENT
Start: 2019-04-03 | End: 2019-09-28 | Stop reason: SDUPTHER

## 2019-04-09 ENCOUNTER — CONSULT (OUTPATIENT)
Dept: BARIATRICS | Facility: CLINIC | Age: 34
End: 2019-04-09
Payer: COMMERCIAL

## 2019-04-09 VITALS
BODY MASS INDEX: 43.75 KG/M2 | HEIGHT: 66 IN | TEMPERATURE: 99.8 F | HEART RATE: 82 BPM | SYSTOLIC BLOOD PRESSURE: 122 MMHG | RESPIRATION RATE: 16 BRPM | DIASTOLIC BLOOD PRESSURE: 74 MMHG | WEIGHT: 272.25 LBS

## 2019-04-09 DIAGNOSIS — E66.01 CLASS 3 SEVERE OBESITY WITHOUT SERIOUS COMORBIDITY WITH BODY MASS INDEX (BMI) OF 40.0 TO 44.9 IN ADULT, UNSPECIFIED OBESITY TYPE (HCC): Primary | ICD-10-CM

## 2019-04-09 DIAGNOSIS — R60.9 PERIPHERAL EDEMA: ICD-10-CM

## 2019-04-09 DIAGNOSIS — F41.9 ANXIETY: ICD-10-CM

## 2019-04-09 PROBLEM — E28.2 PCOS (POLYCYSTIC OVARIAN SYNDROME): Status: ACTIVE | Noted: 2019-04-09

## 2019-04-09 PROCEDURE — 99244 OFF/OP CNSLTJ NEW/EST MOD 40: CPT | Performed by: PHYSICIAN ASSISTANT

## 2019-04-24 ENCOUNTER — CLINICAL SUPPORT (OUTPATIENT)
Dept: BARIATRICS | Facility: CLINIC | Age: 34
End: 2019-04-24

## 2019-04-24 VITALS
HEART RATE: 61 BPM | SYSTOLIC BLOOD PRESSURE: 118 MMHG | WEIGHT: 271.4 LBS | DIASTOLIC BLOOD PRESSURE: 78 MMHG | TEMPERATURE: 99.6 F | BODY MASS INDEX: 43.62 KG/M2 | HEIGHT: 66 IN | RESPIRATION RATE: 16 BRPM

## 2019-04-24 DIAGNOSIS — E66.01 MORBID OBESITY (HCC): Primary | ICD-10-CM

## 2019-04-24 DIAGNOSIS — E66.01 OBESITY, CLASS III, BMI 40-49.9 (MORBID OBESITY) (HCC): Primary | ICD-10-CM

## 2019-04-24 PROCEDURE — RECHECK

## 2019-05-01 ENCOUNTER — OFFICE VISIT (OUTPATIENT)
Dept: FAMILY MEDICINE CLINIC | Facility: CLINIC | Age: 34
End: 2019-05-01
Payer: COMMERCIAL

## 2019-05-01 VITALS
WEIGHT: 274 LBS | HEART RATE: 87 BPM | SYSTOLIC BLOOD PRESSURE: 122 MMHG | HEIGHT: 66 IN | OXYGEN SATURATION: 100 % | DIASTOLIC BLOOD PRESSURE: 82 MMHG | BODY MASS INDEX: 44.03 KG/M2 | RESPIRATION RATE: 18 BRPM | TEMPERATURE: 97.4 F

## 2019-05-01 DIAGNOSIS — E66.01 CLASS 3 SEVERE OBESITY DUE TO EXCESS CALORIES WITHOUT SERIOUS COMORBIDITY WITH BODY MASS INDEX (BMI) OF 40.0 TO 44.9 IN ADULT (HCC): ICD-10-CM

## 2019-05-01 DIAGNOSIS — F41.9 ANXIETY: ICD-10-CM

## 2019-05-01 DIAGNOSIS — R60.9 PERIPHERAL EDEMA: Primary | ICD-10-CM

## 2019-05-01 PROCEDURE — 99214 OFFICE O/P EST MOD 30 MIN: CPT | Performed by: FAMILY MEDICINE

## 2019-05-07 ENCOUNTER — TELEPHONE (OUTPATIENT)
Dept: CARDIOLOGY CLINIC | Facility: CLINIC | Age: 34
End: 2019-05-07

## 2019-05-22 ENCOUNTER — TELEPHONE (OUTPATIENT)
Dept: BARIATRICS | Facility: CLINIC | Age: 34
End: 2019-05-22

## 2019-05-22 ENCOUNTER — OFFICE VISIT (OUTPATIENT)
Dept: BARIATRICS | Facility: CLINIC | Age: 34
End: 2019-05-22
Payer: COMMERCIAL

## 2019-05-22 VITALS
WEIGHT: 273.25 LBS | BODY MASS INDEX: 43.91 KG/M2 | HEART RATE: 68 BPM | SYSTOLIC BLOOD PRESSURE: 120 MMHG | TEMPERATURE: 98.7 F | RESPIRATION RATE: 16 BRPM | HEIGHT: 66 IN | DIASTOLIC BLOOD PRESSURE: 78 MMHG

## 2019-05-22 DIAGNOSIS — E66.01 CLASS 3 SEVERE OBESITY WITHOUT SERIOUS COMORBIDITY WITH BODY MASS INDEX (BMI) OF 40.0 TO 44.9 IN ADULT, UNSPECIFIED OBESITY TYPE (HCC): Primary | ICD-10-CM

## 2019-05-22 DIAGNOSIS — Z01.818 PREOPERATIVE CLEARANCE: ICD-10-CM

## 2019-05-22 DIAGNOSIS — F41.9 ANXIETY: ICD-10-CM

## 2019-05-22 DIAGNOSIS — R60.9 PERIPHERAL EDEMA: ICD-10-CM

## 2019-05-22 PROCEDURE — 99214 OFFICE O/P EST MOD 30 MIN: CPT | Performed by: PHYSICIAN ASSISTANT

## 2019-06-10 ENCOUNTER — CONSULT (OUTPATIENT)
Dept: CARDIOLOGY CLINIC | Facility: CLINIC | Age: 34
End: 2019-06-10
Payer: COMMERCIAL

## 2019-06-10 VITALS
BODY MASS INDEX: 44.68 KG/M2 | HEIGHT: 66 IN | SYSTOLIC BLOOD PRESSURE: 112 MMHG | HEART RATE: 61 BPM | WEIGHT: 278 LBS | OXYGEN SATURATION: 98 % | DIASTOLIC BLOOD PRESSURE: 72 MMHG

## 2019-06-10 DIAGNOSIS — E66.01 MORBID OBESITY (HCC): ICD-10-CM

## 2019-06-10 DIAGNOSIS — E66.01 CLASS 3 SEVERE OBESITY WITHOUT SERIOUS COMORBIDITY WITH BODY MASS INDEX (BMI) OF 40.0 TO 44.9 IN ADULT, UNSPECIFIED OBESITY TYPE (HCC): ICD-10-CM

## 2019-06-10 DIAGNOSIS — Z01.810 PREOPERATIVE CARDIOVASCULAR EXAMINATION: Primary | ICD-10-CM

## 2019-06-10 DIAGNOSIS — R60.9 PERIPHERAL EDEMA: ICD-10-CM

## 2019-06-10 PROCEDURE — 93000 ELECTROCARDIOGRAM COMPLETE: CPT | Performed by: INTERNAL MEDICINE

## 2019-06-10 PROCEDURE — 99243 OFF/OP CNSLTJ NEW/EST LOW 30: CPT | Performed by: INTERNAL MEDICINE

## 2019-06-12 DIAGNOSIS — J01.40 ACUTE NON-RECURRENT PANSINUSITIS: ICD-10-CM

## 2019-06-12 RX ORDER — FLUTICASONE PROPIONATE 50 MCG
SPRAY, SUSPENSION (ML) NASAL
Qty: 16 G | Refills: 0 | OUTPATIENT
Start: 2019-06-12

## 2019-06-19 ENCOUNTER — OFFICE VISIT (OUTPATIENT)
Dept: BARIATRICS | Facility: CLINIC | Age: 34
End: 2019-06-19
Payer: COMMERCIAL

## 2019-06-19 VITALS
HEIGHT: 66 IN | DIASTOLIC BLOOD PRESSURE: 70 MMHG | RESPIRATION RATE: 14 BRPM | WEIGHT: 272.2 LBS | SYSTOLIC BLOOD PRESSURE: 106 MMHG | HEART RATE: 63 BPM | BODY MASS INDEX: 43.75 KG/M2 | TEMPERATURE: 98 F

## 2019-06-19 DIAGNOSIS — E66.01 CLASS 3 SEVERE OBESITY WITHOUT SERIOUS COMORBIDITY WITH BODY MASS INDEX (BMI) OF 40.0 TO 44.9 IN ADULT, UNSPECIFIED OBESITY TYPE (HCC): Primary | ICD-10-CM

## 2019-06-19 DIAGNOSIS — R60.9 PERIPHERAL EDEMA: ICD-10-CM

## 2019-06-19 DIAGNOSIS — F41.9 ANXIETY: ICD-10-CM

## 2019-06-19 PROCEDURE — 99214 OFFICE O/P EST MOD 30 MIN: CPT | Performed by: PHYSICIAN ASSISTANT

## 2019-07-16 ENCOUNTER — OFFICE VISIT (OUTPATIENT)
Dept: BARIATRICS | Facility: CLINIC | Age: 34
End: 2019-07-16

## 2019-07-16 VITALS — HEIGHT: 66 IN | BODY MASS INDEX: 44.07 KG/M2 | WEIGHT: 274.2 LBS

## 2019-07-16 DIAGNOSIS — Z98.84 BARIATRIC SURGERY STATUS: ICD-10-CM

## 2019-07-16 DIAGNOSIS — E66.01 MORBID (SEVERE) OBESITY DUE TO EXCESS CALORIES (HCC): Primary | ICD-10-CM

## 2019-07-16 PROCEDURE — RECHECK

## 2019-07-16 NOTE — PROGRESS NOTES
Bariatric Follow Up Nutrition Note    Preop  6 Month Program (3 of 6)    Type of surgery  Preop  Surgery Date: TBD  Surgeon: Dr Amber Barrera  37 y o   female  Height 5' 6" (1 676 m), weight 124 kg (274 lb 3 2 oz), not currently breastfeeding      Weight in (lb) to have BMI = 25: 155 6  Pre-Op Excess Wt: 118 6lb  Net Weight change:  +2 8lbs since initial eval  Goal weight to submit:  264 6lbs    Review of History and Medications   Past Medical History:   Diagnosis Date    Allergic rhinitis     Anemia     ok currently    Anxiety     Asthma     activity induced    Benign neoplasm of skin of trunk     Polycystic ovary syndrome     Skin tag     Tinea versicolor      Past Surgical History:   Procedure Laterality Date    CERVICAL CERCLAGE       SECTION  2017    TONSILLECTOMY      TUBAL LIGATION       Social History     Socioeconomic History    Marital status:      Spouse name: Not on file    Number of children: Not on file    Years of education: Not on file    Highest education level: Not on file   Occupational History    Not on file   Social Needs    Financial resource strain: Not on file    Food insecurity:     Worry: Not on file     Inability: Not on file    Transportation needs:     Medical: Not on file     Non-medical: Not on file   Tobacco Use    Smoking status: Never Smoker    Smokeless tobacco: Never Used   Substance and Sexual Activity    Alcohol use: Yes     Comment: rarely    Drug use: No    Sexual activity: Not on file   Lifestyle    Physical activity:     Days per week: Not on file     Minutes per session: Not on file    Stress: Not on file   Relationships    Social connections:     Talks on phone: Not on file     Gets together: Not on file     Attends Judaism service: Not on file     Active member of club or organization: Not on file     Attends meetings of clubs or organizations: Not on file     Relationship status: Not on file    Intimate partner violence:     Fear of current or ex partner: Not on file     Emotionally abused: Not on file     Physically abused: Not on file     Forced sexual activity: Not on file   Other Topics Concern    Not on file   Social History Narrative    Not on file       Current Outpatient Medications:     acetaminophen (TYLENOL) 325 mg tablet, Take 650 mg by mouth every 6 (six) hours as needed for mild pain, Disp: , Rfl:     escitalopram (LEXAPRO) 10 mg tablet, Take 1 tablet (10 mg total) by mouth daily, Disp: 30 tablet, Rfl: 5    ferrous sulfate 325 (65 Fe) mg tablet, Take 325 mg by mouth daily with breakfast, Disp: , Rfl:     fluticasone (FLONASE) 50 mcg/act nasal spray, 2 sprays into each nostril daily (Patient taking differently: 2 sprays into each nostril as needed ), Disp: 16 g, Rfl: 0    hydrochlorothiazide (HYDRODIURIL) 25 mg tablet, Take 1 tablet (25 mg total) by mouth daily, Disp: 30 tablet, Rfl: 5    ketorolac (ACULAR) 0 5 % ophthalmic solution, Administer 1 drop into the left eye 4 (four) times a day as needed (As needed for eye pain) for up to 5 days, Disp: 5 mL, Rfl: 0    Food Intake and Lifestyle Assessment   Food Intake Assessment completed via usual diet recall  Breakfast: skips or bagel with cream cheese  Snack: -   Lunch: fast food or skips, starting to do more salads this week ie:  Tomatoes, olives, pickles over lettuce  Snack: -  Dinner: if skipped breakfast and lunch will be very hungry therefore over eats  Will make chicken in the crockpot and have with butter noodles, doesn't like veggies    When has pasta will have a smaller portion  Snack: not often  Beverage intake: water and diet soda  Diet texture/stage: regular  Protein supplement: none, but did suggest to use as a meal replacement for breakfast  Estimated protein intake per day: 50-60gm  Estimated fluid intake per day: 64oz water per day, very occasional will have a diet soda  Meals eaten away from home: working on decreasing the fast food at lunch  Typical meal pattern: 1-3 meals per day and 0-1 snacks per day  Eating Behaviors: Consumption of high calorie/ high fat foods, Large portion sizes and Mindless eating    Food allergies or intolerances: none  Cultural or Adventist considerations: none    Physical Assessment    Physical Activity  Types of exercise: None, just extra walking in her daily activity  Current physical limitations: none    Psychosocial Assessment   Support systems: spouse and children  Socioeconomic factors: none    Nutrition Diagnosis  Diagnosis: Overweight / Obesity (NC-3 3)  Related to: Physical inactivity and Excessive energy intake  As Evidenced by: BMI >25     Interventions and Teaching   Patient educated on post-op nutrition guidelines  Patient educated and handouts provided  Protein supplements  Meal planning and preparation  Intuitive eating  Techniques for self monitoring and keeping daily food journal    Education provided to: patient    Barriers to learning: No barriers identified    Readiness to change: preparation    Comprehension: verbalizes understanding     Expected Compliance: good    Workflow:  still needs sleep appt/study, meet with surgeon/EDG, support group, 3 more weight checks, pre-op weight loss      Goals  Food journal  Exercise 30 minutes 5 times per week (can split to 15 mins 2x/day)  Complete lession plans 1-6  Eat 3 meals per day with protein at each meal  Eliminate mindless snacking  Replace breakfast with a protien shake     Time Spent:   30 Minutes

## 2019-08-16 ENCOUNTER — OFFICE VISIT (OUTPATIENT)
Dept: BARIATRICS | Facility: CLINIC | Age: 34
End: 2019-08-16

## 2019-08-16 VITALS — WEIGHT: 268.6 LBS | BODY MASS INDEX: 43.17 KG/M2 | HEIGHT: 66 IN

## 2019-08-16 DIAGNOSIS — Z98.84 BARIATRIC SURGERY STATUS: ICD-10-CM

## 2019-08-16 DIAGNOSIS — E66.01 MORBID (SEVERE) OBESITY DUE TO EXCESS CALORIES (HCC): Primary | ICD-10-CM

## 2019-08-16 PROCEDURE — RECHECK

## 2019-08-16 NOTE — PROGRESS NOTES
Bariatric Follow Up Nutrition Note    Preop  6 Month Program (4 of 6)    Type of surgery  Preop  Surgery Date: TBD  Surgeon: Dr Tiffany Mario  37 y o   female  Height 5' 6" (1 676 m), weight 122 kg (268 lb 9 6 oz), not currently breastfeeding  Body mass index is 43 35 kg/m²      Weight in (lb) to have BMI = 25: 155 6  Pre-Op Excess Wt: 118 6lb  Net Weight change:  -5 6lbs x 1 month  Goal weight to submit:  264 6lbs    Review of History and Medications   Past Medical History:   Diagnosis Date    Allergic rhinitis     Anemia     ok currently    Anxiety     Asthma     activity induced    Benign neoplasm of skin of trunk     Polycystic ovary syndrome     Skin tag     Tinea versicolor      Past Surgical History:   Procedure Laterality Date    CERVICAL CERCLAGE       SECTION  2017    TONSILLECTOMY      TUBAL LIGATION       Social History     Socioeconomic History    Marital status:      Spouse name: Not on file    Number of children: Not on file    Years of education: Not on file    Highest education level: Not on file   Occupational History    Not on file   Social Needs    Financial resource strain: Not on file    Food insecurity:     Worry: Not on file     Inability: Not on file    Transportation needs:     Medical: Not on file     Non-medical: Not on file   Tobacco Use    Smoking status: Never Smoker    Smokeless tobacco: Never Used   Substance and Sexual Activity    Alcohol use: Yes     Comment: rarely    Drug use: No    Sexual activity: Not on file   Lifestyle    Physical activity:     Days per week: Not on file     Minutes per session: Not on file    Stress: Not on file   Relationships    Social connections:     Talks on phone: Not on file     Gets together: Not on file     Attends Sikhism service: Not on file     Active member of club or organization: Not on file     Attends meetings of clubs or organizations: Not on file Relationship status: Not on file    Intimate partner violence:     Fear of current or ex partner: Not on file     Emotionally abused: Not on file     Physically abused: Not on file     Forced sexual activity: Not on file   Other Topics Concern    Not on file   Social History Narrative    Not on file       Current Outpatient Medications:     acetaminophen (TYLENOL) 325 mg tablet, Take 650 mg by mouth every 6 (six) hours as needed for mild pain, Disp: , Rfl:     escitalopram (LEXAPRO) 10 mg tablet, Take 1 tablet (10 mg total) by mouth daily, Disp: 30 tablet, Rfl: 5    ferrous sulfate 325 (65 Fe) mg tablet, Take 325 mg by mouth daily with breakfast, Disp: , Rfl:     fluticasone (FLONASE) 50 mcg/act nasal spray, 2 sprays into each nostril daily (Patient taking differently: 2 sprays into each nostril as needed ), Disp: 16 g, Rfl: 0    hydrochlorothiazide (HYDRODIURIL) 25 mg tablet, Take 1 tablet (25 mg total) by mouth daily, Disp: 30 tablet, Rfl: 5    ketorolac (ACULAR) 0 5 % ophthalmic solution, Administer 1 drop into the left eye 4 (four) times a day as needed (As needed for eye pain) for up to 5 days, Disp: 5 mL, Rfl: 0    Food Intake and Lifestyle Assessment   Food Intake Assessment completed via [employer] kayode (logged many meals, but not every day)  Breakfast: protein shake sometimes with PB crackers  Snack: -   Lunch: still doing some fast food, but making better choices, mostly ordering smaller portions ie:   Only a double cheese burger or a grilled chicken sandwich with a small krishna or will bring hard boiled eggs from home (4)  Snack: -  Dinner: 3oz chicken and 1 cup of butter noodles (doesn't like many veggies, but is slowly trying them) or chicken tenders or homemade mac & cheese and 1 chicken leg or mac & cheese and two hot dogs  Snack: -  Beverage intake: water and diet soda (1 per day)  Diet texture/stage: regular  Protein supplement: having for breakfast  Estimated protein intake per day: 50-90gm  Estimated fluid intake per day: 64oz water per day, one diet soda per day  Meals eaten away from home: working on decreasing the fast food at lunch, now making better choices and watching portions  Typical meal pattern: 3 meals per day and 0-1 snacks per day  Eating Behaviors: Consumption of high calorie/ high fat foods, Large portion sizes and eating out often    Food allergies or intolerances: none  Cultural or Buddhism considerations: none    Physical Assessment    Physical Activity  Types of exercise: No set exercise regimen  Meeting 10,000 steps per day  Current physical limitations: none    Psychosocial Assessment   Support systems: spouse and children  Socioeconomic factors: none    Nutrition Diagnosis  Diagnosis: Overweight / Obesity (NC-3 3)  Related to: Physical inactivity and Excessive energy intake  As Evidenced by: BMI >25     Interventions and Teaching   Patient educated on post-op nutrition guidelines  Patient educated and handouts provided      Meal planning and preparation  Intuitive eating  Techniques for self monitoring and keeping daily food journal  Measuring portions    Education provided to: patient    Barriers to learning: No barriers identified    Readiness to change: preparation    Comprehension: verbalizes understanding     Expected Compliance: good    Evaluation / Monitoring  Eating pattern as discussed Body weight Physical activity       6 months of weight checks (4 of 6 today)   Labs:  going for rest of blood work on 8/17 (CBC and CMP)   Sleep:  scheduled for 10/8   Cardio:  6/10/19 cleared   Meet w/surgeon:  scheduled for 9/6-Les   EGD   Support Group:  gave updated schedule   Weight loss: 5% = -13 57lbs (257 83lbs)1/2 to submit to insurance = -6 79lbs (264 6lbs)BMI 40 to qualify if not dx with BERENICE = 248lbs  BMI 35 to qualify if does have BERENICE = 217lbs        Goals  Food journal via [employer]  10,000 steps per day  Complete lession plans 1-6  Eat 3 meals per day with protein at each meal  Continue to avoid mindless snacking  Continue to replace breakfast with a protien shake  Work on 30/60 rule     Time Spent:   30 Minutes

## 2019-08-18 ENCOUNTER — OFFICE VISIT (OUTPATIENT)
Dept: URGENT CARE | Facility: CLINIC | Age: 34
End: 2019-08-18
Payer: COMMERCIAL

## 2019-08-18 VITALS
RESPIRATION RATE: 18 BRPM | TEMPERATURE: 99.7 F | SYSTOLIC BLOOD PRESSURE: 121 MMHG | HEART RATE: 82 BPM | BODY MASS INDEX: 43.23 KG/M2 | DIASTOLIC BLOOD PRESSURE: 81 MMHG | HEIGHT: 66 IN | WEIGHT: 269 LBS | OXYGEN SATURATION: 98 %

## 2019-08-18 DIAGNOSIS — B00.2 HERPES VIRUS INFECTION OF ORAL MUCOSA: Primary | ICD-10-CM

## 2019-08-18 PROCEDURE — 99213 OFFICE O/P EST LOW 20 MIN: CPT | Performed by: FAMILY MEDICINE

## 2019-08-18 RX ORDER — VALACYCLOVIR HYDROCHLORIDE 1 G/1
1000 TABLET, FILM COATED ORAL 2 TIMES DAILY
Qty: 14 TABLET | Refills: 0 | Status: SHIPPED | OUTPATIENT
Start: 2019-08-18 | End: 2019-08-19

## 2019-08-18 NOTE — PROGRESS NOTES
[facility]        NAME: Jose Raul Martínez is a 37 y o  female  : 1985    MRN: [id number]  DATE: 2019  TIME: 11:18 AM    Assessment and Plan   Herpes virus infection of oral mucosa [B00 9]  1  Herpes virus infection of oral mucosa  al mag oxide-diphenhydramine-lidocaine viscous (MAGIC MOUTHWASH) 1:1:1 suspension    valACYclovir (VALTREX) 1,000 mg tablet     Symptoms concerning for intraoral manifestations of herpes simplex virus  Patient started on a one-week course of Valtrex and given magic mouthwash for symptomatic relief  Good hand hygiene encouraged  Patient Instructions     Follow up with PCP in 3-5 days  Proceed to  ER if symptoms worsen  Chief Complaint     Chief Complaint   Patient presents with    Mouth Lesions     Sore inside mouth beginning this AM  One sore on bottom lip for 2 days  Pt has never had issues with sores in the past     Sinusitis     Sinus congestion, body aches, feeling feverish for approx 1 week  No OTC meds taken         History of Present Illness       78-year-old female presents today with oral lesions and pain which has persisted for 1 week  Has had a cold sore on the lower lip which has just started to scab  Reports that she woke up this morning with pain and ulcerations within the mouth associated with gum swelling and throat pain  Has never experienced this before  Of note her  recently had cold sores and she suspects getting infected  Has never had cold sores before or a unknown herpes infection  Review of Systems   Review of Systems   Constitutional: Positive for diaphoresis and fever (100 7)  Negative for chills  HENT: Positive for dental problem and trouble swallowing  Respiratory: Negative for cough and shortness of breath            Current Medications       Current Outpatient Medications:     acetaminophen (TYLENOL) 325 mg tablet, Take 650 mg by mouth every 6 (six) hours as needed for mild pain, Disp: , Rfl:    escitalopram (LEXAPRO) 10 mg tablet, Take 1 tablet (10 mg total) by mouth daily, Disp: 30 tablet, Rfl: 5    fluticasone (FLONASE) 50 mcg/act nasal spray, 2 sprays into each nostril daily (Patient taking differently: 2 sprays into each nostril as needed ), Disp: 16 g, Rfl: 0    hydrochlorothiazide (HYDRODIURIL) 25 mg tablet, Take 1 tablet (25 mg total) by mouth daily, Disp: 30 tablet, Rfl: 5    al mag oxide-diphenhydramine-lidocaine viscous (MAGIC MOUTHWASH) 1:1:1 suspension, Swish and spit 10 mL every 4 (four) hours as needed for mouth pain or discomfort, Disp: 1 Bottle, Rfl: 0    ferrous sulfate 325 (65 Fe) mg tablet, Take 325 mg by mouth daily with breakfast, Disp: , Rfl:     ketorolac (ACULAR) 0 5 % ophthalmic solution, Administer 1 drop into the left eye 4 (four) times a day as needed (As needed for eye pain) for up to 5 days, Disp: 5 mL, Rfl: 0    valACYclovir (VALTREX) 1,000 mg tablet, Take 1 tablet (1,000 mg total) by mouth 2 (two) times a day for 7 days, Disp: 14 tablet, Rfl: 0    Current Allergies     Allergies as of 2019 - Reviewed 2019   Allergen Reaction Noted    Amoxicillin  2018            The following portions of the patient's history were reviewed and updated as appropriate: allergies, current medications, past family history, past medical history, past social history, past surgical history and problem list      Past Medical History:   Diagnosis Date    Allergic rhinitis     Anemia     ok currently    Anxiety     Asthma     activity induced    Benign neoplasm of skin of trunk     Polycystic ovary syndrome     Skin tag     Tinea versicolor        Past Surgical History:   Procedure Laterality Date    CERVICAL CERCLAGE       SECTION  2017    TONSILLECTOMY      TUBAL LIGATION         Family History   Problem Relation Age of Onset    Diabetes Mother     Heart disease Mother         CHF    Hypertension Mother     Cancer Father         kidney    Aneurysm Father         AAA    COPD Father     Kidney disease Father     No Known Problems Sister     Cancer Maternal Grandmother         bladder    Cancer Paternal Grandfather         brain    Stroke Paternal Grandfather     No Known Problems Sister          Medications have been verified  Objective   /81 (BP Location: Left arm, Patient Position: Sitting, Cuff Size: Large)   Pulse 82   Temp 99 7 °F (37 6 °C) (Tympanic)   Resp 18   Ht 5' 6" (1 676 m)   Wt 122 kg (269 lb)   SpO2 98%   BMI 43 42 kg/m²        Physical Exam     Physical Exam   Constitutional: She is oriented to person, place, and time  She appears well-developed and well-nourished  She appears distressed  HENT:   Head: Normocephalic and atraumatic  Small white ulcerations within the buccal mucosa  Darkened and edematous gums  Dried brown flaking beneath lower lip on the outside  Eyes: Conjunctivae are normal    Pulmonary/Chest: Effort normal    Neurological: She is alert and oriented to person, place, and time  Skin: Skin is warm  She is not diaphoretic  No erythema  Psychiatric: She has a normal mood and affect  Her behavior is normal  Judgment and thought content normal    Nursing note and vitals reviewed

## 2019-08-19 ENCOUNTER — OFFICE VISIT (OUTPATIENT)
Dept: FAMILY MEDICINE CLINIC | Facility: CLINIC | Age: 34
End: 2019-08-19
Payer: COMMERCIAL

## 2019-08-19 VITALS
WEIGHT: 267 LBS | HEART RATE: 65 BPM | BODY MASS INDEX: 43.09 KG/M2 | RESPIRATION RATE: 18 BRPM | OXYGEN SATURATION: 99 % | SYSTOLIC BLOOD PRESSURE: 118 MMHG | TEMPERATURE: 98.9 F | DIASTOLIC BLOOD PRESSURE: 76 MMHG

## 2019-08-19 DIAGNOSIS — T50.905A ADVERSE EFFECT OF DRUG, INITIAL ENCOUNTER: ICD-10-CM

## 2019-08-19 DIAGNOSIS — J01.40 ACUTE NON-RECURRENT PANSINUSITIS: Primary | ICD-10-CM

## 2019-08-19 PROCEDURE — 99213 OFFICE O/P EST LOW 20 MIN: CPT | Performed by: NURSE PRACTITIONER

## 2019-08-19 RX ORDER — METHYLPREDNISOLONE 4 MG/1
TABLET ORAL
Qty: 21 EACH | Refills: 0 | Status: SHIPPED | OUTPATIENT
Start: 2019-08-19 | End: 2020-06-24 | Stop reason: ALTCHOICE

## 2019-08-19 RX ORDER — SULFAMETHOXAZOLE AND TRIMETHOPRIM 800; 160 MG/1; MG/1
1 TABLET ORAL EVERY 12 HOURS SCHEDULED
Qty: 6 TABLET | Refills: 0 | Status: SHIPPED | OUTPATIENT
Start: 2019-08-19 | End: 2019-08-22

## 2019-08-19 NOTE — LETTER
August 19, 2019     Patient: Nai Livingston   YOB: 1985   Date of Visit: 8/19/2019       To Whom it May Concern:    Kasia Hurtado is under my professional care  She was seen in my office on 8/19/2019  She may return to work tomorrow 8/20/19  If you have any questions or concerns, please don't hesitate to call           Sincerely,          Kenny Yang NP        CC: No Recipients

## 2019-08-19 NOTE — PROGRESS NOTES
Assessment/Plan:  1  Facial swelling may be due to reaction to Valtrex  Advised patient to stop taking the Valtrex immediately and start Benadryl  2  Take medication as prescribed  3  Follow up on Friday for recheck     Diagnoses and all orders for this visit:    Acute non-recurrent pansinusitis  -     sulfamethoxazole-trimethoprim (BACTRIM DS) 800-160 mg per tablet; Take 1 tablet by mouth every 12 (twelve) hours for 3 days    Adverse effect of drug, initial encounter  -     methylPREDNISolone 4 MG tablet therapy pack; Use as directed on package          Subjective:      Patient ID: Nai Livingston is a 37 y o  female  A 27-year-old female presents with facial swelling for 1 day  Reports she was seen in the urgent care yesterday and started on Valtrex  Reports she has a cold sore  Reports that is resolving  She also reports that she started with some sinus pressure and pain approximately 1 and half weeks ago  Denies fever  Denies any cough/wheeze  Denies any oral swelling  The following portions of the patient's history were reviewed and updated as appropriate: allergies and current medications  Review of Systems   Constitutional: Negative  HENT: Positive for congestion, sinus pressure and sinus pain  Respiratory: Negative  Cardiovascular: Negative  Skin: Positive for rash  Objective:      /76   Pulse 65   Temp 98 9 °F (37 2 °C)   Resp 18   Wt 121 kg (267 lb)   SpO2 99%   BMI 43 09 kg/m²          Physical Exam   Constitutional: She appears well-developed and well-nourished  HENT:   Head: Normocephalic and atraumatic  Right Ear: Tympanic membrane and external ear normal    Left Ear: Tympanic membrane and external ear normal    Nose: Right sinus exhibits maxillary sinus tenderness and frontal sinus tenderness  Left sinus exhibits maxillary sinus tenderness and frontal sinus tenderness     Mouth/Throat: Oropharynx is clear and moist    Small healing blister on lower lip

## 2019-09-05 ENCOUNTER — TELEPHONE (OUTPATIENT)
Dept: BARIATRICS | Facility: CLINIC | Age: 34
End: 2019-09-05

## 2019-09-06 ENCOUNTER — OFFICE VISIT (OUTPATIENT)
Dept: BARIATRICS | Facility: CLINIC | Age: 34
End: 2019-09-06

## 2019-09-06 VITALS — HEIGHT: 66 IN | WEIGHT: 264.4 LBS | BODY MASS INDEX: 42.49 KG/M2

## 2019-09-06 DIAGNOSIS — Z98.84 BARIATRIC SURGERY STATUS: ICD-10-CM

## 2019-09-06 DIAGNOSIS — E66.01 MORBID (SEVERE) OBESITY DUE TO EXCESS CALORIES (HCC): Primary | ICD-10-CM

## 2019-09-06 PROCEDURE — RECHECK

## 2019-09-06 NOTE — PROGRESS NOTES
Bariatric Follow Up Nutrition Note    Preop  6 Month Program (5 of 6)    Type of surgery  Preop  Surgery Date: TBD  Surgeon: Dr Tiffany Mario  37 y o   female  Ht:  77"  Wt:  264 4lbs  BMI 42 7lbs    Weight in (lb) to have BMI = 25: 155 6  Pre-Op Excess Wt: 108 8lb  Weight change:  -4 2lbs x 1 month  Goal weight to submit:  264 6lbs (met goal today)    Review of History and Medications   Past Medical History:   Diagnosis Date    Allergic rhinitis     Anemia     ok currently    Anxiety     Asthma     activity induced    Benign neoplasm of skin of trunk     Polycystic ovary syndrome     Skin tag     Tinea versicolor      Past Surgical History:   Procedure Laterality Date    CERVICAL CERCLAGE       SECTION  2017    TONSILLECTOMY      TUBAL LIGATION       Social History     Socioeconomic History    Marital status:      Spouse name: Not on file    Number of children: Not on file    Years of education: Not on file    Highest education level: Not on file   Occupational History    Not on file   Social Needs    Financial resource strain: Not on file    Food insecurity:     Worry: Not on file     Inability: Not on file    Transportation needs:     Medical: Not on file     Non-medical: Not on file   Tobacco Use    Smoking status: Never Smoker    Smokeless tobacco: Never Used   Substance and Sexual Activity    Alcohol use: Yes     Comment: rarely    Drug use: No    Sexual activity: Yes   Lifestyle    Physical activity:     Days per week: Not on file     Minutes per session: Not on file    Stress: Not on file   Relationships    Social connections:     Talks on phone: Not on file     Gets together: Not on file     Attends Anglican service: Not on file     Active member of club or organization: Not on file     Attends meetings of clubs or organizations: Not on file     Relationship status: Not on file    Intimate partner violence:     Fear of current or ex partner: Not on file     Emotionally abused: Not on file     Physically abused: Not on file     Forced sexual activity: Not on file   Other Topics Concern    Not on file   Social History Narrative    Not on file       Current Outpatient Medications:     acetaminophen (TYLENOL) 325 mg tablet, Take 650 mg by mouth every 6 (six) hours as needed for mild pain, Disp: , Rfl:     al mag oxide-diphenhydramine-lidocaine viscous (MAGIC MOUTHWASH) 1:1:1 suspension, Swish and spit 10 mL every 4 (four) hours as needed for mouth pain or discomfort, Disp: 1 Bottle, Rfl: 0    escitalopram (LEXAPRO) 10 mg tablet, Take 1 tablet (10 mg total) by mouth daily, Disp: 30 tablet, Rfl: 5    fluticasone (FLONASE) 50 mcg/act nasal spray, 2 sprays into each nostril daily (Patient taking differently: 2 sprays into each nostril as needed ), Disp: 16 g, Rfl: 0    hydrochlorothiazide (HYDRODIURIL) 25 mg tablet, Take 1 tablet (25 mg total) by mouth daily, Disp: 30 tablet, Rfl: 5    ketorolac (ACULAR) 0 5 % ophthalmic solution, Administer 1 drop into the left eye 4 (four) times a day as needed (As needed for eye pain) for up to 5 days, Disp: 5 mL, Rfl: 0    methylPREDNISolone 4 MG tablet therapy pack, Use as directed on package, Disp: 21 each, Rfl: 0    Food Intake and Lifestyle Assessment   Food Intake Assessment completed via "[employer] kayode (logged many meals, but not every day)  Breakfast: protein shake only  Snack: -   Lunch: bigger meal is lunch now since started night school  PBJ or leftover or eggs (2-3) ---if does hard boiled will not eat the yolk or 2 small lakhwinder slices  Snack: -  Dinner: last night, beef hot dog    Other options would be:  3oz chicken and 1 cup of butter noodles (doesn't like many veggies, but is slowly trying them) or chicken tenders or homemade mac & cheese and 1 chicken leg or mac & cheese and two hot dogs  Snack: -  Beverage intake: water and diet soda (1 per day, which is down from 64oz/day)  Diet texture/stage: regular  Protein supplement: having for breakfast  Estimated protein intake per day: 50-90gm  Estimated fluid intake per day: 64oz water per day, one diet soda per day  Meals eaten away from home: working on decreasing the fast food at lunch, now making better choices and watching portions  Typical meal pattern: 3 meals per day and 0-1 snacks per day  Eating Behaviors: Consumption of high calorie/ high fat foods, Large portion sizes and eating out often    Food allergies or intolerances: dislikes yogurt and veggies, even after trying multiple times  Cultural or Hindu considerations: none    Physical Assessment    Physical Activity  Wakes up at 6am and on the go all day with kids to school, work and now she started night school 4 nights per week and 1 online class which she normally does on the weekend  Types of exercise: No set exercise regimen  Meeting 10,000 steps per day  Current physical limitations: none    Psychosocial Assessment   Support systems: spouse and children  Socioeconomic factors: none    Nutrition Diagnosis  Diagnosis: Overweight / Obesity (NC-3 3)  Related to: Physical inactivity and Excessive energy intake  As Evidenced by: BMI >25     Interventions and Teaching   Patient educated on post-op nutrition guidelines  Patient educated and handouts provided      Meal planning and preparation  Intuitive eating  Techniques for self monitoring and keeping daily food journal  Measuring portions    Education provided to: patient    Barriers to learning: No barriers identified    Readiness to change: preparation    Comprehension: verbalizes understanding     Expected Compliance: good    Evaluation / Monitoring  Eating pattern as discussed Body weight Physical activity       6 months of weight checks (5 of 6 today)   Labs: plans on going for CBC and CMP tomorrow   Sleep:  scheduled for 10/8   Cardio:  6/10/19 cleared   Meet w/surgeon:    EGD   Support Group:  gave updated schedule   Weight loss: 5% = -13 57lbs (257 83lbs)1/2 to submit to insurance = -6 79lbs (264 6lbs)BMI 40 to qualify if not dx with BERENICE = 248lbs  BMI 35 to qualify if does have BERENICE = 217lbs   Met submit weight goal today         Goals  Food journal via [employer]  10,000 steps per day  Complete lession plans 1-6  Eat 3 meals per day with protein at each meal  Continue to avoid mindless snacking  Continue to replace breakfast with a protien shake  continue with the 30/60 rule     Time Spent:   30 Minutes

## 2019-09-28 DIAGNOSIS — F41.9 ANXIETY: ICD-10-CM

## 2019-09-28 DIAGNOSIS — R60.9 PERIPHERAL EDEMA: ICD-10-CM

## 2019-09-29 RX ORDER — ESCITALOPRAM OXALATE 10 MG/1
10 TABLET ORAL DAILY
Qty: 30 TABLET | Refills: 5 | Status: SHIPPED | OUTPATIENT
Start: 2019-09-29 | End: 2020-06-30

## 2019-09-29 RX ORDER — HYDROCHLOROTHIAZIDE 25 MG/1
25 TABLET ORAL DAILY
Qty: 30 TABLET | Refills: 5 | Status: SHIPPED | OUTPATIENT
Start: 2019-09-29 | End: 2020-04-13

## 2019-10-09 ENCOUNTER — OFFICE VISIT (OUTPATIENT)
Dept: BARIATRICS | Facility: CLINIC | Age: 34
End: 2019-10-09

## 2019-10-09 VITALS — WEIGHT: 264.8 LBS | HEIGHT: 66 IN | BODY MASS INDEX: 42.56 KG/M2

## 2019-10-09 DIAGNOSIS — Z98.84 BARIATRIC SURGERY STATUS: ICD-10-CM

## 2019-10-09 DIAGNOSIS — E66.01 MORBID (SEVERE) OBESITY DUE TO EXCESS CALORIES (HCC): Primary | ICD-10-CM

## 2019-10-09 PROCEDURE — RECHECK

## 2019-10-09 NOTE — PROGRESS NOTES
Bariatric Follow Up Nutrition Note    Preop  6 Month Program (6 of 6)    Type of surgery  Preop  Surgery Date: TBD  Surgeon: Dr Roxie Perez  37 y o   female  Ht:  77"  Wt:  264 4lbs  BMI 42 7lbs    Weight in (lb) to have BMI = 25: 155 6  Pre-Op Excess Wt: 108 8lb  Weight change: maintained  Goal weight to submit:  264 6lbs (met goal in Sept and maintained weight this month)    Review of History and Medications   Past Medical History:   Diagnosis Date    Allergic rhinitis     Anemia     ok currently    Anxiety     Asthma     activity induced    Benign neoplasm of skin of trunk     Polycystic ovary syndrome     Skin tag     Tinea versicolor      Past Surgical History:   Procedure Laterality Date    CERVICAL CERCLAGE       SECTION  2017    TONSILLECTOMY      TUBAL LIGATION       Social History     Socioeconomic History    Marital status:      Spouse name: Not on file    Number of children: Not on file    Years of education: Not on file    Highest education level: Not on file   Occupational History    Not on file   Social Needs    Financial resource strain: Not on file    Food insecurity:     Worry: Not on file     Inability: Not on file    Transportation needs:     Medical: Not on file     Non-medical: Not on file   Tobacco Use    Smoking status: Never Smoker    Smokeless tobacco: Never Used   Substance and Sexual Activity    Alcohol use: Yes     Comment: rarely    Drug use: No    Sexual activity: Yes   Lifestyle    Physical activity:     Days per week: Not on file     Minutes per session: Not on file    Stress: Not on file   Relationships    Social connections:     Talks on phone: Not on file     Gets together: Not on file     Attends Sikhism service: Not on file     Active member of club or organization: Not on file     Attends meetings of clubs or organizations: Not on file     Relationship status: Not on file    Intimate partner violence:     Fear of current or ex partner: Not on file     Emotionally abused: Not on file     Physically abused: Not on file     Forced sexual activity: Not on file   Other Topics Concern    Not on file   Social History Narrative    Not on file       Current Outpatient Medications:     acetaminophen (TYLENOL) 325 mg tablet, Take 650 mg by mouth every 6 (six) hours as needed for mild pain, Disp: , Rfl:     al mag oxide-diphenhydramine-lidocaine viscous (MAGIC MOUTHWASH) 1:1:1 suspension, Swish and spit 10 mL every 4 (four) hours as needed for mouth pain or discomfort, Disp: 1 Bottle, Rfl: 0    escitalopram (LEXAPRO) 10 mg tablet, TAKE 1 TABLET (10 MG TOTAL) BY MOUTH DAILY, Disp: 30 tablet, Rfl: 5    fluticasone (FLONASE) 50 mcg/act nasal spray, 2 sprays into each nostril daily (Patient taking differently: 2 sprays into each nostril as needed ), Disp: 16 g, Rfl: 0    hydrochlorothiazide (HYDRODIURIL) 25 mg tablet, TAKE 1 TABLET (25 MG TOTAL) BY MOUTH DAILY, Disp: 30 tablet, Rfl: 5    ketorolac (ACULAR) 0 5 % ophthalmic solution, Administer 1 drop into the left eye 4 (four) times a day as needed (As needed for eye pain) for up to 5 days, Disp: 5 mL, Rfl: 0    methylPREDNISolone 4 MG tablet therapy pack, Use as directed on package, Disp: 21 each, Rfl: 0    Food Intake and Lifestyle Assessment   Food Intake Assessment completed via [employer] kayode (logged many meals, but not every day)  Breakfast: on way to work between 7:30 and 8am:  protein shake only  Snack: gets hungry, but tries not to have anything  advised can have a small snack  Provided with list and she marked off snacks she would like  Lunch: 12pm:  bigger meal is lunch now since started night school  PBJ or leftover or eggs (2-3) ---if does hard boiled will not eat the yolk or 2 small lakhwinder slices  PBJ and hard boiled eggs  Snack: -  Dinner: 5pm:  last night, pizza    Other options would be:  3oz chicken and 1 cup of butter noodles (doesn't like many veggies, but is slowly trying them) or chicken tenders or homemade mac & cheese and 1 chicken leg or mac & cheese and two hot dogs or crockpot meals  Snack: -  Beverage intake: water and diet soda (1 per day, which is down from 64oz/day)  Diet texture/stage: regular  Protein supplement: having for breakfast  Estimated protein intake per day: 50-90gm  Estimated fluid intake per day: 64oz water per day, one diet soda per day  Meals eaten away from home: was doing well with avoiding the fast food, until this week  This week they ordered out often  She plans on going back to brining her lunch with her to work  Overall, her biggest changes has been watching portions  Typical meal pattern: 3 meals per day and 0-1 snacks per day  Eating Behaviors: Consumption of high calorie/ high fat foods, Large portion sizes and eating out often    Food allergies or intolerances: dislikes yogurt and veggies, even after trying multiple times  Cultural or Orthodox considerations: none    Physical Assessment    Physical Activity  Wakes up at 6am and on the go all day with kids to school, work and now she started night school 4 nights per week and 1 online class which she normally does on the weekend  Types of exercise: No set exercise regimen  Meeting 10,000 steps per day  Current physical limitations: none    Psychosocial Assessment   Support systems: spouse and children  Socioeconomic factors: none    Nutrition Diagnosis  Diagnosis: Overweight / Obesity (NC-3 3)  Related to: Physical inactivity and Excessive energy intake  As Evidenced by: BMI >25     Interventions and Teaching   Patient educated on post-op nutrition guidelines  Patient educated and handouts provided      Meal planning and preparation  Intuitive eating  Techniques for self monitoring and keeping daily food journal  Measuring portions  Snacks    Education provided to: patient    Barriers to learning: No barriers identified    Readiness to change: preparation    Comprehension: verbalizes understanding     Expected Compliance: good    Evaluation / Monitoring  Eating pattern as discussed Body weight Physical activity       6 months of weight checks (6 of 6 today)   Labs: still needs CBC and CMP    Sleep: office called and rescheduled it for 10/15, but might need to wait for new insurance coverage so may need to reschedule   Cardio:  6/10/19 cleared   Meet w/surgeon:  Awaiting new insurance coverage   EGD: needs to meet with surgeon first   Support Group:  Oct 3rd   Weight loss: 5% = -13 57lbs (257 83lbs)1/2 to submit to insurance = -6 79lbs (264 6lbs)BMI 40 to qualify if not dx with BERENICE = 248lbs  BMI 35 to qualify if does have BERENICE = 217lbs   Met submit weight goal today  Pt was dropped from her insurance plan ([facility] 52 [place]) because she makes too much  Her job does offer insurance but open enrollment does not start until November and then that insurance wouldn't kick in until January  Pt is currently looking for alternate insurance coverage  In the meantime she has been maintaining her weight with monitoring her portions sizes and having a protein shake for breakfast  She states she start getting hungry around 10am   Suggested to pt to have a snack  Provided with snack list and had pt josemanuel off the snacks she likes  Advised pt to continue to come in on a monthly basis to ensure she maintains or continues the weight loss  She still needs to complete a sleep consult, repeat blood work, meet with the surgeon and have an [address] which all needs to wait to be completed until she gets new insurance coverage        Goals  Food journal via "[employer]  10,000 steps per day  Complete lesson plans 1-6  Eat 3 meals per day with protein at each meal  Continue to avoid mindless snacking-add one planned snack at 10am  Continue to replace breakfast with a protien shake, including on the weekends  continue with the 30/60 rule     Time Spent:   30 Minutes

## 2019-11-11 ENCOUNTER — OFFICE VISIT (OUTPATIENT)
Dept: BARIATRICS | Facility: CLINIC | Age: 34
End: 2019-11-11

## 2019-11-11 VITALS — BODY MASS INDEX: 42.78 KG/M2 | WEIGHT: 266.2 LBS | HEIGHT: 66 IN

## 2019-11-11 DIAGNOSIS — Z98.84 BARIATRIC SURGERY STATUS: ICD-10-CM

## 2019-11-11 DIAGNOSIS — E66.01 MORBID (SEVERE) OBESITY DUE TO EXCESS CALORIES (HCC): Primary | ICD-10-CM

## 2019-11-11 PROCEDURE — RECHECK

## 2019-11-11 NOTE — PROGRESS NOTES
Bariatric Follow Up Nutrition Note    Preop  6 Month Program (6 of 6)    Type of surgery  Preop  Surgery Date: TBD  Surgeon: Dr Dea Rocha  37 y o   female  Ht:  77"  Wt:  266 2lbs  BMI 43    Weight in (lb) to have BMI = 25: 155 6  Pre-Op Excess Wt: 110 2lb  Weight change: +1 4lbs  Goal weight to submit:  264 6lbs     Review of History and Medications   Past Medical History:   Diagnosis Date    Allergic rhinitis     Anemia     ok currently    Anxiety     Asthma     activity induced    Benign neoplasm of skin of trunk     Polycystic ovary syndrome     Skin tag     Tinea versicolor      Past Surgical History:   Procedure Laterality Date    CERVICAL CERCLAGE       SECTION  2017    TONSILLECTOMY      TUBAL LIGATION       Social History     Socioeconomic History    Marital status:      Spouse name: Not on file    Number of children: Not on file    Years of education: Not on file    Highest education level: Not on file   Occupational History    Not on file   Social Needs    Financial resource strain: Not on file    Food insecurity:     Worry: Not on file     Inability: Not on file    Transportation needs:     Medical: Not on file     Non-medical: Not on file   Tobacco Use    Smoking status: Never Smoker    Smokeless tobacco: Never Used   Substance and Sexual Activity    Alcohol use: Yes     Comment: rarely    Drug use: No    Sexual activity: Yes   Lifestyle    Physical activity:     Days per week: Not on file     Minutes per session: Not on file    Stress: Not on file   Relationships    Social connections:     Talks on phone: Not on file     Gets together: Not on file     Attends Nondenominational service: Not on file     Active member of club or organization: Not on file     Attends meetings of clubs or organizations: Not on file     Relationship status: Not on file    Intimate partner violence:     Fear of current or ex partner: Not on file     Emotionally abused: Not on file     Physically abused: Not on file     Forced sexual activity: Not on file   Other Topics Concern    Not on file   Social History Narrative    Not on file       Current Outpatient Medications:     acetaminophen (TYLENOL) 325 mg tablet, Take 650 mg by mouth every 6 (six) hours as needed for mild pain, Disp: , Rfl:     al mag oxide-diphenhydramine-lidocaine viscous (MAGIC MOUTHWASH) 1:1:1 suspension, Swish and spit 10 mL every 4 (four) hours as needed for mouth pain or discomfort, Disp: 1 Bottle, Rfl: 0    escitalopram (LEXAPRO) 10 mg tablet, TAKE 1 TABLET (10 MG TOTAL) BY MOUTH DAILY, Disp: 30 tablet, Rfl: 5    fluticasone (FLONASE) 50 mcg/act nasal spray, 2 sprays into each nostril daily (Patient taking differently: 2 sprays into each nostril as needed ), Disp: 16 g, Rfl: 0    hydrochlorothiazide (HYDRODIURIL) 25 mg tablet, TAKE 1 TABLET (25 MG TOTAL) BY MOUTH DAILY, Disp: 30 tablet, Rfl: 5    ketorolac (ACULAR) 0 5 % ophthalmic solution, Administer 1 drop into the left eye 4 (four) times a day as needed (As needed for eye pain) for up to 5 days, Disp: 5 mL, Rfl: 0    methylPREDNISolone 4 MG tablet therapy pack, Use as directed on package, Disp: 21 each, Rfl: 0    Food Intake and Lifestyle Assessment   Food Intake Assessment completed via [employer] kayode (logged many meals, but not every day)    Breakfast: on way to work between 7:30 and 8am:  protein shake only or yesterday:  Will have cheese stick  Snack: -  Lunch: 12pm:  bigger meal is lunch now since started night school  PBJ or leftover or eggs (2-3) ---if does hard boiled will not eat the yolk, handful olives, pickles, and cheese stick  Or PBJ and hard boiled eggs  Yesterday has a can of soup  Snack: -  Dinner: 5pm:  last night, pizza    Other options would be:  3oz chicken and 1 cup of butter noodles (doesn't like many veggies, but is slowly trying them) or chicken tenders or homemade mac & cheese and 1 chicken leg or mac & cheese and two hot dogs or crockpot meals  Last night had pasta with tomato sauce with 1/2 piece [place] bread  Snack: not often  Stops eating at 5:30-6pm  had a handful of candy corn  Beverage intake: water and diet soda (1 per day, which is down from 64oz/day)  Diet texture/stage: regular  Protein supplement: having for breakfast  Estimated protein intake per day: 50-90gm  Estimated fluid intake per day: 64oz water per day, one diet soda per day  Meals eaten away from home: was doing well with avoiding the fast food, until this week  This week they ordered out often  She plans on going back to brining her lunch with her to work  Overall, her biggest changes has been watching portions  Typical meal pattern: 3 meals per day and 0-1 snacks per day  Eating Behaviors: Consumption of high calorie/ high fat foods, Large portion sizes and eating out often    Food allergies or intolerances: dislikes yogurt and veggies, even after trying multiple times  Cultural or Yarsanism considerations: none    Physical Assessment    Physical Activity  Wakes up at 6am and on the go all day with kids to school, work and now she started night school 4 nights per week and 1 online class which she normally does on the weekend  Types of exercise: No set exercise regimen  Meeting 10,000 steps per day during the work weeks  on the weekends a little harder because doing school work  Current physical limitations: none    Psychosocial Assessment   Support systems: spouse and children  Socioeconomic factors: none    Nutrition Diagnosis  Diagnosis: Overweight / Obesity (NC-3 3)  Related to: Physical inactivity and Excessive energy intake  As Evidenced by: BMI >25     Interventions and Teaching   Patient educated on post-op nutrition guidelines  Patient educated and handouts provided      Meal planning and preparation  Intuitive eating  Techniques for self monitoring and keeping daily food journal  Measuring portions  Snacks    Education provided to: patient    Barriers to learning: No barriers identified    Readiness to change: preparation    Comprehension: verbalizes understanding     Expected Compliance: good    Evaluation / Monitoring  Eating pattern as discussed Body weight Physical activity       6 months of weight checks (6 of 6 as Oct)   Labs: still needs CBC and CMP    Sleep: office called and rescheduled it for 10/15, but might need to wait for new insurance coverage so may need to reschedule   Cardio:  6/10/19 cleared   Meet w/surgeon:  Awaiting new insurance coverage   EGD: needs to meet with surgeon first   Support Group:  Oct 3rd   Weight loss: 5% = -13 57lbs (257 83lbs)  1/2 to submit to insurance = -6 79lbs (264 6lbs)  BMI 40 to qualify if not dx with BERENICE = 248lbs    BMI 35 to qualify if does have BERENICE = 217lbs      Pt was dropped from her insurance plan last month ([employer] 52 [place]) because she "makes too much" so reapplied for family care, but if gets denied will sign up for her works plan during this open enrollment, which is [address]  In the meantime she has gained a little weight  She has been doing well avoiding snacking since she has been so busy and is maintaining her portion sizes  Pt has not been keeping a food log lately  Advised to get back into the habit since she is creeping up in weight a little  Pt also reports that she has been following a 30/30 rule, having difficulty getting to 60 mins after a meal, but will continue to work on it  Advised pt to continue to come in on a monthly basis to ensure she maintains or continues the weight loss  She still needs to complete a sleep consult, repeat blood work, meet with the surgeon and have an [address] which all needs to wait to be completed until she gets new insurance coverage        Goals  Food journal via [employer]  10,000 steps per day  Complete lesson plans 1-6  Eat 3 meals per day with protein at each meal  Continue to avoid mindless snacking  Continue to replace breakfast with a protien shake, including on the weekends  continue with the 30/60 rule     Time Spent:   30 Minutes

## 2019-11-26 ENCOUNTER — OFFICE VISIT (OUTPATIENT)
Dept: URGENT CARE | Facility: CLINIC | Age: 34
End: 2019-11-26
Payer: COMMERCIAL

## 2019-11-26 VITALS
HEART RATE: 60 BPM | RESPIRATION RATE: 18 BRPM | BODY MASS INDEX: 43.9 KG/M2 | DIASTOLIC BLOOD PRESSURE: 78 MMHG | OXYGEN SATURATION: 98 % | WEIGHT: 272 LBS | TEMPERATURE: 98.8 F | SYSTOLIC BLOOD PRESSURE: 133 MMHG

## 2019-11-26 DIAGNOSIS — J02.9 SORE THROAT: Primary | ICD-10-CM

## 2019-11-26 LAB — S PYO AG THROAT QL: NEGATIVE

## 2019-11-26 PROCEDURE — 87880 STREP A ASSAY W/OPTIC: CPT | Performed by: PREVENTIVE MEDICINE

## 2019-11-26 PROCEDURE — 99213 OFFICE O/P EST LOW 20 MIN: CPT | Performed by: PREVENTIVE MEDICINE

## 2019-11-26 RX ORDER — AZITHROMYCIN 250 MG/1
TABLET, FILM COATED ORAL
Qty: 6 TABLET | Refills: 0 | Status: SHIPPED | OUTPATIENT
Start: 2019-11-26 | End: 2019-11-30

## 2019-11-26 NOTE — LETTER
November 26, 2019     Patient: Beatriz Arnett   YOB: 1985   Date of Visit: 11/26/2019       To Whom It May Concern: It is my medical opinion that Vipin Moran may return to work on 11/29/2019  If you have any questions or concerns, please don't hesitate to call           Sincerely,        Gayle Conley MD    CC: No Recipients

## 2019-11-26 NOTE — PATIENT INSTRUCTIONS
Pharyngitis   AMBULATORY CARE:   Pharyngitis , or sore throat, is inflammation of the tissues and structures in your pharynx (throat)  Pharyngitis is most often caused by bacteria  It may also be caused by a cold or flu virus  Other causes include smoking, allergies, or acid reflux  Signs and symptoms that may occur with pharyngitis:   · Sore throat or pain when you swallow    · Fever, chills, and body aches    · Hoarse or raspy voice    · Cough, runny or stuffy nose, itchy or watery eyes    · Headache    · Upset stomach and loss of appetite    · Mild neck stiffness    · Swollen glands that feel like hard lumps when you touch your neck    · White and yellow pus-filled blisters in the back of your throat  Call 911 for any of the following:   · You have trouble breathing or swallowing because your throat is swollen or sore  Seek care immediately if:   · You are drooling because it hurts too much to swallow  · Your fever is higher than 102? F (39?C) or lasts longer than 3 days  · You are confused  · You taste blood in your throat  Contact your healthcare provider if:   · Your throat pain gets worse  · You have a painful lump in your throat that does not go away after 5 days  · Your symptoms do not improve after 5 days  · You have questions or concerns about your condition or care  Treatment for pharyngitis:  Viral pharyngitis will go away on its own without treatment  Your sore throat should start to feel better in 3 to 5 days for both viral and bacterial infections  You may need any of the following:  · Antibiotics  treat a bacterial infection  · NSAIDs , such as ibuprofen, help decrease swelling, pain, and fever  NSAIDs can cause stomach bleeding or kidney problems in certain people  If you take blood thinner medicine, always ask your healthcare provider if NSAIDs are safe for you  Always read the medicine label and follow directions  · Acetaminophen  decreases pain and fever   It is available without a doctor's order  Ask how much to take and how often to take it  Follow directions  Acetaminophen can cause liver damage if not taken correctly  Manage your symptoms:   · Gargle salt water  Mix ¼ teaspoon salt in an 8 ounce glass of warm water and gargle  This may help decrease swelling in your throat  · Drink liquids as directed  You may need to drink more liquids than usual  Liquids may help soothe your throat and prevent dehydration  Ask how much liquid to drink each day and which liquids are best for you  · Use a cool-steam humidifier  to help moisten the air in your room and calm your cough  · Soothe your throat  with cough drops, ice, soft foods, or popsicles  Prevent the spread of pharyngitis:  Cover your mouth and nose when you cough or sneeze  Do not share food or drinks  Wash your hands often  Use soap and water  If soap and water are unavailable, use an alcohol based hand   Follow up with your healthcare provider as directed:  Write down your questions so you remember to ask them during your visits  © 2017 [facility] Information is for End User's use only and may not be sold, redistributed or otherwise used for commercial purposes  All illustrations and images included in CareNotes® are the copyrighted property of [employer]  or [employer]  The above information is an  only  It is not intended as medical advice for individual conditions or treatments  Talk to your doctor, nurse or pharmacist before following any medical regimen to see if it is safe and effective for you

## 2019-11-27 NOTE — PROGRESS NOTES
[facility]        NAME: Sherine De La Cruz is a 37 y o  female  : 1985    MRN: [id number]  DATE: 2019  TIME: 9:31 AM    Assessment and Plan   Sore throat [J02 9]  1  Sore throat  POCT rapid strepA    azithromycin (ZITHROMAX) 250 mg tablet         Patient Instructions       Follow up with PCP in 3-5 days  Proceed to  ER if symptoms worsen  Chief Complaint   No chief complaint on file  History of Present Illness       Sore Throat    This is a new problem  The current episode started in the past 7 days  The problem has been unchanged  There has been no fever  The pain is at a severity of 6/10  The pain is moderate  Associated symptoms include coughing  She has tried nothing for the symptoms  The treatment provided no relief  Review of Systems   Review of Systems   Constitutional: Negative  HENT: Positive for sore throat  Eyes: Negative  Respiratory: Positive for cough  Cardiovascular: Negative  Allergic/Immunologic: Negative            Current Medications       Current Outpatient Medications:     acetaminophen (TYLENOL) 325 mg tablet, Take 650 mg by mouth every 6 (six) hours as needed for mild pain, Disp: , Rfl:     escitalopram (LEXAPRO) 10 mg tablet, TAKE 1 TABLET (10 MG TOTAL) BY MOUTH DAILY, Disp: 30 tablet, Rfl: 5    fluticasone (FLONASE) 50 mcg/act nasal spray, 2 sprays into each nostril daily (Patient taking differently: 2 sprays into each nostril as needed ), Disp: 16 g, Rfl: 0    hydrochlorothiazide (HYDRODIURIL) 25 mg tablet, TAKE 1 TABLET (25 MG TOTAL) BY MOUTH DAILY, Disp: 30 tablet, Rfl: 5    al mag oxide-diphenhydramine-lidocaine viscous (MAGIC MOUTHWASH) 1:1:1 suspension, Swish and spit 10 mL every 4 (four) hours as needed for mouth pain or discomfort (Patient not taking: Reported on 2019), Disp: 1 Bottle, Rfl: 0    azithromycin (ZITHROMAX) 250 mg tablet, Take 2 tablets today then 1 tablet daily x 4 days, Disp: 6 tablet, Rfl: 0   methylPREDNISolone 4 MG tablet therapy pack, Use as directed on package (Patient not taking: Reported on 2019), Disp: 21 each, Rfl: 0    Current Allergies     Allergies as of 2019 - Reviewed 2019   Allergen Reaction Noted    Amoxicillin  2018    Valtrex [valacyclovir]  2019            The following portions of the patient's history were reviewed and updated as appropriate: allergies, current medications, past family history, past medical history, past social history, past surgical history and problem list      Past Medical History:   Diagnosis Date    Allergic rhinitis     Anemia     ok currently    Anxiety     Asthma     activity induced    Benign neoplasm of skin of trunk     Polycystic ovary syndrome     Skin tag     Tinea versicolor        Past Surgical History:   Procedure Laterality Date    CERVICAL CERCLAGE       SECTION  2017    TONSILLECTOMY      TUBAL LIGATION         Family History   Problem Relation Age of Onset    Diabetes Mother     Heart disease Mother         CHF    Hypertension Mother     Cancer Father         kidney    Aneurysm Father         AAA    COPD Father     Kidney disease Father     No Known Problems Sister     Cancer Maternal Grandmother         bladder    Cancer Paternal Grandfather         brain    Stroke Paternal Grandfather     No Known Problems Sister          Medications have been verified  Objective   /78   Pulse 60   Temp 98 8 °F (37 1 °C)   Resp 18   Wt 123 kg (272 lb)   SpO2 98%   BMI 43 90 kg/m²        Physical Exam     Physical Exam   Constitutional: She appears well-developed and well-nourished  HENT:   Head: Normocephalic  Right Ear: External ear normal    Left Ear: External ear normal    Nose: Mucosal edema and rhinorrhea present  Mouth/Throat: Posterior oropharyngeal erythema present  No oropharyngeal exudate or posterior oropharyngeal edema  Neck: Normal range of motion  Cardiovascular: Normal rate, regular rhythm and normal heart sounds  Pulmonary/Chest: Effort normal  She has no wheezes  Lymphadenopathy:     She has no cervical adenopathy  Skin: Skin is warm  No rash noted  No pallor  Nursing note and vitals reviewed

## 2019-12-06 ENCOUNTER — OFFICE VISIT (OUTPATIENT)
Dept: BARIATRICS | Facility: CLINIC | Age: 34
End: 2019-12-06
Payer: COMMERCIAL

## 2019-12-06 VITALS
HEART RATE: 62 BPM | HEIGHT: 66 IN | WEIGHT: 262.4 LBS | SYSTOLIC BLOOD PRESSURE: 118 MMHG | RESPIRATION RATE: 16 BRPM | BODY MASS INDEX: 42.17 KG/M2 | DIASTOLIC BLOOD PRESSURE: 80 MMHG | TEMPERATURE: 99.2 F

## 2019-12-06 DIAGNOSIS — E28.2 PCOS (POLYCYSTIC OVARIAN SYNDROME): ICD-10-CM

## 2019-12-06 DIAGNOSIS — Z01.818 ENCOUNTER FOR OTHER PREPROCEDURAL EXAMINATION: ICD-10-CM

## 2019-12-06 DIAGNOSIS — R53.83 MALAISE AND FATIGUE: ICD-10-CM

## 2019-12-06 DIAGNOSIS — R53.81 MALAISE AND FATIGUE: ICD-10-CM

## 2019-12-06 DIAGNOSIS — E66.01 MORBID (SEVERE) OBESITY DUE TO EXCESS CALORIES (HCC): Primary | ICD-10-CM

## 2019-12-06 PROCEDURE — 99204 OFFICE O/P NEW MOD 45 MIN: CPT | Performed by: SURGERY

## 2019-12-06 NOTE — PROGRESS NOTES
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  She desires to pursue metabolic and bariatric surgery to improve her health and to be around for her teenage kid and twin newborns  Denies tobacco  NSAIDs for headaches occasionally but states tylenol works for her so she will quit NSAIDs  No DVT/PE history  Here today to discuss bariatric options  Visit type: initial visit    Symptoms: excess weight, edema, knee pain, back pain and joint pain      Associated Conditions: none  Disease Complications: osteoarthritis and chronic venous stasis  Weight Loss Interest: high    Exercise Frequency:daily  Types of Exercise: walking      Review of Systems    Historical Information   Past Medical History:   Diagnosis Date    Allergic rhinitis     Anemia     ok currently    Anxiety     Asthma     activity induced    Benign neoplasm of skin of trunk     Polycystic ovary syndrome     Skin tag     Tinea versicolor      Past Surgical History:   Procedure Laterality Date    CERVICAL CERCLAGE       SECTION  2017    TONSILLECTOMY      TUBAL LIGATION       Social History   Social History     Substance and Sexual Activity   Alcohol Use Yes    Comment: rarely     Social History     Substance and Sexual Activity   Drug Use No     Social History     Tobacco Use   Smoking Status Never Smoker   Smokeless Tobacco Never Used     Family History:  Morbid obesity (mother - RYGB)    Meds/Allergies   all medications and allergies reviewed  Allergies   Allergen Reactions    Valtrex [Valacyclovir] Swelling    Amoxicillin      Tingling of mouth        Objective       Current Vitals:   /80 (BP Location: Right arm, Patient Position: Sitting, Cuff Size: Large)   Pulse 62   Temp 99 2 °F (37 3 °C) (Tympanic)   Resp 16   Ht 5' 6" (1 676 m)   Wt 119 kg (262 lb 6 4 oz)   BMI 42 35 kg/m²         Physical Exam   Constitutional: She is oriented to person, place, and time  She appears well-developed and well-nourished  HENT:   Head: Atraumatic  Eyes: EOM are normal    Neck: Neck supple  Cardiovascular: Normal rate  Pulmonary/Chest: Effort normal    Abdominal: Soft  She exhibits no distension  There is no tenderness  Musculoskeletal: Normal range of motion  Neurological: She is alert and oriented to person, place, and time  Skin: Skin is warm and dry  Psychiatric: She has a normal mood and affect  Her behavior is normal    Vitals reviewed  Lab Results: I have personally reviewed pertinent lab results  Imaging: I have personally reviewed pertinent reports  EKG, Pathology, and Other Studies: I have personally reviewed pertinent reports  Assessment/PLAN:    37 y o  yo female with a long standing h/o of obesity and inability to sustain any meaningful weight loss on her own despite several attempts  She is interested in the Laparoscopic analilia-en-y gastric bypass  As a part of her pre op evaluation, she will be referred to a cardiologist and for a sleep evaluation and consult  She needs an EGD to evaluate the anatomy of her GI tract prior to the operation  I have spent over 45 minutes with her face to face in the office today discussing her options and details of the surgery  We have seen an animation of the surgery on the computer that illustrates how the operation is done and how the anatomy will be altered with the procedure  Over 50% of this was coordinating care  She was given the opportunity to ask questions and I have answered all of them    I have discussed and educated the patient with regards to the components of our multidisciplinary program and the importance of compliance and follow up in the post operative period  The patient was also instructed with regards to the importance of behavior modification, nutritional counseling, support meeting attendance and lifestyle changes that are important to ensure success  Although there is a great statistical chance of improvement or even resolution of most of her associated comorbidities, the results vary from patient to patient and they largely depend on her commitment and compliance  She needs to lose 13 lbs (258) prior to the operation      Ashli Harding MD  12/6/2019  9:39 AM

## 2019-12-06 NOTE — LETTER
December 6, 2019     [place], [address]    Patient: Dinah Delgado   YOB: 1985   Date of Visit: 12/6/2019       Dear Dr Nilesh Taylor: Thank you for referring Gabinotae Brito to me for evaluation for metabolic and bariatric surgery  Below are my notes for this consultation  If you have questions, please do not hesitate to call me  I look forward to following your patient along with you  Sincerely,        Shalini Ramos MD        CC: No Recipients  Shalini Ramos MD  12/6/2019  9:43 AM  Sign at close encounter      BARIATRIC INITIAL CONSULT - BARIATRIC SURGERY    Marisol Weber 37 y o  female MRN: [id number]  Unit/Bed#:  Encounter: [id number]      HPI:  Dinah Delgado is a 37 y o  female who presents with a longstanding history of morbid obesity and inability to sustain a meaningful weight loss  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  She desires to pursue metabolic and bariatric surgery to improve her health and to be around for her teenage kid and twin newborns  Denies tobacco  NSAIDs for headaches occasionally but states tylenol works for her so she will quit NSAIDs  No DVT/PE history  Here today to discuss bariatric options      Visit type: initial visit    Symptoms: excess weight, edema, knee pain, back pain and joint pain      Associated Conditions: none  Disease Complications: osteoarthritis and chronic venous stasis  Weight Loss Interest: high    Exercise Frequency:daily  Types of Exercise: walking      Review of Systems    Historical Information   Past Medical History:   Diagnosis Date    Allergic rhinitis     Anemia 2012    ok currently    Anxiety     Asthma     activity induced    Benign neoplasm of skin of trunk     Polycystic ovary syndrome     Skin tag     Tinea versicolor      Past Surgical History:   Procedure Laterality Date    CERVICAL CERCLAGE       SECTION  2017    TONSILLECTOMY      TUBAL LIGATION       Social History   Social History     Substance and Sexual Activity   Alcohol Use Yes    Comment: rarely     Social History     Substance and Sexual Activity   Drug Use No     Social History     Tobacco Use   Smoking Status Never Smoker   Smokeless Tobacco Never Used     Family History: Morbid obesity (mother - RYGB)    Meds/Allergies   all medications and allergies reviewed  Allergies   Allergen Reactions    Valtrex [Valacyclovir] Swelling    Amoxicillin      Tingling of mouth        Objective       Current Vitals:   /80 (BP Location: Right arm, Patient Position: Sitting, Cuff Size: Large)   Pulse 62   Temp 99 2 °F (37 3 °C) (Tympanic)   Resp 16   Ht 5' 6" (1 676 m)   Wt 119 kg (262 lb 6 4 oz)   BMI 42 35 kg/m²          Physical Exam   Constitutional: She is oriented to person, place, and time  She appears well-developed and well-nourished  HENT:   Head: Atraumatic  Eyes: EOM are normal    Neck: Neck supple  Cardiovascular: Normal rate  Pulmonary/Chest: Effort normal    Abdominal: Soft  She exhibits no distension  There is no tenderness  Musculoskeletal: Normal range of motion  Neurological: She is alert and oriented to person, place, and time  Skin: Skin is warm and dry  Psychiatric: She has a normal mood and affect  Her behavior is normal    Vitals reviewed  Lab Results: I have personally reviewed pertinent lab results  Imaging: I have personally reviewed pertinent reports  EKG, Pathology, and Other Studies: I have personally reviewed pertinent reports  Assessment/PLAN:    37 y o  yo female with a long standing h/o of obesity and inability to sustain any meaningful weight loss on her own despite several attempts  She is interested in the Laparoscopic analilia-en-y gastric bypass      As a part of her pre op evaluation, she will be referred to a cardiologist and for a sleep evaluation and consult  She needs an EGD to evaluate the anatomy of her GI tract prior to the operation  I have spent over 45 minutes with her face to face in the office today discussing her options and details of the surgery  We have seen an animation of the surgery on the computer that illustrates how the operation is done and how the anatomy will be altered with the procedure  Over 50% of this was coordinating care  She was given the opportunity to ask questions and I have answered all of them  I have discussed and educated the patient with regards to the components of our multidisciplinary program and the importance of compliance and follow up in the post operative period  The patient was also instructed with regards to the importance of behavior modification, nutritional counseling, support meeting attendance and lifestyle changes that are important to ensure success  Although there is a great statistical chance of improvement or even resolution of most of her associated comorbidities, the results vary from patient to patient and they largely depend on her commitment and compliance  She needs to lose 13 lbs (258) prior to the operation      Irish Dallas MD  12/6/2019  9:39 AM

## 2019-12-20 ENCOUNTER — HOSPITAL ENCOUNTER (OUTPATIENT)
Dept: GASTROENTEROLOGY | Facility: AMBULARY SURGERY CENTER | Age: 34
Setting detail: OUTPATIENT SURGERY
Discharge: HOME | End: 2019-12-20
Attending: SURGERY

## 2020-01-16 ENCOUNTER — ANESTHESIA EVENT (OUTPATIENT)
Dept: GASTROENTEROLOGY | Facility: AMBULARY SURGERY CENTER | Age: 35
End: 2020-01-16

## 2020-01-16 NOTE — ANESTHESIA PREPROCEDURE EVALUATION
Review of Systems/Medical History  Patient summary reviewed  Chart reviewed  No history of anesthetic complications     Cardiovascular   Pulmonary  Asthma , seasonal/exercise induced ,        GI/Hepatic            Endo/Other    Comment: PCOS Obesity  morbid obesity   GYN       Hematology  Anemia ,     Musculoskeletal       Neurology   Psychology   Anxiety,              Physical Exam    Airway    Mallampati score: III  TM Distance: >3 FB  Neck ROM: full     Dental       Cardiovascular  Rhythm: regular, Rate: normal,     Pulmonary  Breath sounds clear to auscultation,     Other Findings        Anesthesia Plan  ASA Score- 2     Anesthesia Type- IV sedation with anesthesia with ASA Monitors  Additional Monitors:   Airway Plan:         Plan Factors-    Induction- intravenous  Postoperative Plan-     Informed Consent- Anesthetic plan and risks discussed with patient  I personally reviewed this patient with the CRNA  Discussed and agreed on the Anesthesia Plan with the CRNA  Juan Irene

## 2020-01-17 ENCOUNTER — ANESTHESIA (OUTPATIENT)
Dept: GASTROENTEROLOGY | Facility: AMBULARY SURGERY CENTER | Age: 35
End: 2020-01-17

## 2020-01-17 ENCOUNTER — HOSPITAL ENCOUNTER (OUTPATIENT)
Dept: GASTROENTEROLOGY | Facility: AMBULARY SURGERY CENTER | Age: 35
Setting detail: OUTPATIENT SURGERY
Discharge: HOME | End: 2020-01-17
Attending: SURGERY | Admitting: SURGERY
Payer: COMMERCIAL

## 2020-01-17 VITALS
BODY MASS INDEX: 42.11 KG/M2 | DIASTOLIC BLOOD PRESSURE: 69 MMHG | TEMPERATURE: 97.9 F | HEART RATE: 58 BPM | SYSTOLIC BLOOD PRESSURE: 130 MMHG | HEIGHT: 66 IN | RESPIRATION RATE: 18 BRPM | WEIGHT: 262 LBS | OXYGEN SATURATION: 96 %

## 2020-01-17 DIAGNOSIS — E66.01 MORBID OBESITY (HCC): ICD-10-CM

## 2020-01-17 PROCEDURE — 88305 TISSUE EXAM BY PATHOLOGIST: CPT | Performed by: PATHOLOGY

## 2020-01-17 PROCEDURE — 43239 EGD BIOPSY SINGLE/MULTIPLE: CPT | Performed by: SURGERY

## 2020-01-17 RX ORDER — LIDOCAINE HYDROCHLORIDE 10 MG/ML
INJECTION, SOLUTION EPIDURAL; INFILTRATION; INTRACAUDAL; PERINEURAL AS NEEDED
Status: DISCONTINUED | OUTPATIENT
Start: 2020-01-17 | End: 2020-01-17 | Stop reason: SURG

## 2020-01-17 RX ORDER — SODIUM CHLORIDE, SODIUM LACTATE, POTASSIUM CHLORIDE, CALCIUM CHLORIDE 600; 310; 30; 20 MG/100ML; MG/100ML; MG/100ML; MG/100ML
75 INJECTION, SOLUTION INTRAVENOUS CONTINUOUS
Status: DISCONTINUED | OUTPATIENT
Start: 2020-01-17 | End: 2020-01-21 | Stop reason: HOSPADM

## 2020-01-17 RX ORDER — PROPOFOL 10 MG/ML
INJECTION, EMULSION INTRAVENOUS AS NEEDED
Status: DISCONTINUED | OUTPATIENT
Start: 2020-01-17 | End: 2020-01-17 | Stop reason: SURG

## 2020-01-17 RX ADMIN — SODIUM CHLORIDE, SODIUM LACTATE, POTASSIUM CHLORIDE, AND CALCIUM CHLORIDE 75 ML/HR: .6; .31; .03; .02 INJECTION, SOLUTION INTRAVENOUS at 07:43

## 2020-01-17 RX ADMIN — SODIUM CHLORIDE, SODIUM LACTATE, POTASSIUM CHLORIDE, AND CALCIUM CHLORIDE: .6; .31; .03; .02 INJECTION, SOLUTION INTRAVENOUS at 08:15

## 2020-01-17 RX ADMIN — PROPOFOL 75 MG: 10 INJECTION, EMULSION INTRAVENOUS at 08:23

## 2020-01-17 RX ADMIN — LIDOCAINE HYDROCHLORIDE 100 MG: 10 INJECTION, SOLUTION EPIDURAL; INFILTRATION; INTRACAUDAL; PERINEURAL at 08:21

## 2020-01-17 RX ADMIN — PROPOFOL 125 MG: 10 INJECTION, EMULSION INTRAVENOUS at 08:21

## 2020-01-17 NOTE — ANESTHESIA POSTPROCEDURE EVALUATION
Post-Op Assessment Note    CV Status:  Stable  Pain Score: 0       Mental Status:  Awake   Hydration Status:  Stable   PONV Controlled:  None   Airway Patency:  Patent   Post Op Vitals Reviewed: Yes      Staff: CRNA   Comments: spontaneously breathing, HOB @ 30 degrees   vss, fully endorsed to recovery w/o AC          BP   154/65   Temp      Pulse  66   Resp   12   SpO2   97

## 2020-01-17 NOTE — H&P
This is a 37 y o  female with a history of morbid obesity and Body mass index is 42 29 kg/m²  Here for an EGD to evaluate the anatomy of the GI tract and to rule out the presence of H  pylori  Physical Exam    /65   Pulse 64   Temp 97 9 °F (36 6 °C) (Tympanic)   Resp 16   Ht 5' 6" (1 676 m)   Wt 119 kg (262 lb)   LMP 12/18/2019   SpO2 98%   BMI 42 29 kg/m²    AAOx3  RRR  CTA B  Abdomen obese  Benign  A/P:    This is a 37 y o  female with a history of morbid obesity and Body mass index is 42 29 kg/m²       Will proceed with the EGD and biopsies        Murray August MD  1/17/2020  7:53 AM

## 2020-01-23 ENCOUNTER — TRANSCRIBE ORDERS (OUTPATIENT)
Dept: SLEEP CENTER | Facility: CLINIC | Age: 35
End: 2020-01-23

## 2020-01-23 ENCOUNTER — OFFICE VISIT (OUTPATIENT)
Dept: SLEEP CENTER | Facility: CLINIC | Age: 35
End: 2020-01-23
Payer: COMMERCIAL

## 2020-01-23 VITALS
SYSTOLIC BLOOD PRESSURE: 118 MMHG | HEIGHT: 66 IN | DIASTOLIC BLOOD PRESSURE: 80 MMHG | WEIGHT: 264 LBS | BODY MASS INDEX: 42.43 KG/M2 | HEART RATE: 80 BPM

## 2020-01-23 DIAGNOSIS — R60.9 PERIPHERAL EDEMA: ICD-10-CM

## 2020-01-23 DIAGNOSIS — J30.2 SEASONAL ALLERGIES: ICD-10-CM

## 2020-01-23 DIAGNOSIS — R06.83 SNORING: Primary | ICD-10-CM

## 2020-01-23 DIAGNOSIS — R09.82 POSTNASAL DRIP: ICD-10-CM

## 2020-01-23 DIAGNOSIS — F41.9 ANXIETY: ICD-10-CM

## 2020-01-23 DIAGNOSIS — E66.01 MORBID (SEVERE) OBESITY DUE TO EXCESS CALORIES (HCC): ICD-10-CM

## 2020-01-23 DIAGNOSIS — E66.01 MORBID (SEVERE) OBESITY DUE TO EXCESS CALORIES (HCC): Primary | ICD-10-CM

## 2020-01-23 PROCEDURE — 99244 OFF/OP CNSLTJ NEW/EST MOD 40: CPT | Performed by: INTERNAL MEDICINE

## 2020-01-23 NOTE — PROGRESS NOTES
Review of Systems      Genitourinary none   Cardiology ankle/leg swelling   Gastrointestinal none   Neurology none   Constitutional none   Integumentary none   Psychiatry anxiety   Musculoskeletal legs twitching/jerking and sciatica   Pulmonary none   ENT none   Endocrine none   Hematological none

## 2020-01-23 NOTE — PATIENT INSTRUCTIONS

## 2020-01-23 NOTE — PROGRESS NOTES
Consultation - [facility] [place]  37 y o  female  PII:[birth year]2  HLX:[id number]    Physician Requesting Consult: Vern Olmedo MD     Reason for Consult : At your kind request I saw this patient for initial sleep evaluation today  The patient is planning Bariatric surgery and is here to also evaluate for Obstructive Sleep Apnea  PFSH, Problem List, Medications & Allergies were reviewed in EMR  She  has a past medical history of Allergic rhinitis, Anemia (2012), Anxiety, Asthma, Benign neoplasm of skin of trunk, Polycystic ovary syndrome, Skin tag, and Tinea versicolor  She has a current medication list which includes the following prescription(s): acetaminophen, al mag oxide-diphenhydramine-lidocaine viscous, escitalopram, fluticasone, hydrochlorothiazide, and methylprednisolone  HPI:  She reports snoring only occasionally and is not aware of breathing difficulties during sleep  Other Complaints: none  Restless Leg Syndrome: has typical symptoms occurring around once a week but not dealing sleep  Parasomnia: no features reported    Sleep Routine:   Typical Bedtime:  10:00 p m  Gets OOB:  6:30 a m  TIB:8 5 hrs  Sleep latency:<  30 minutes Sleep Interruptions:2/nite and is able to fall back asleep  Awakens: with aid of an alarm  Upon awakening: is not always refreshed  She denied Excessive Daytime Sleepiness or napping  [place] Sleepiness Scale rated at Total score: 0 /24  Habits: reports that she has never smoked  She has never used smokeless tobacco  , reports that she drinks alcohol  ,  reports that she does not use drugs  ,Caffeine use:moderate , Exercise routine: none   Family History: Mother had obstructive sleep apnea  ROS: reviewed & as attached  Significant for intentional weight loss of around 10 lb in the past 6 months  She has nasal symptoms and postnasal drip due to seasonal allergies  Anxiety is controlled on current medication      EXAM:  /80 Pulse 80   Ht 5' 6" (1 676 m)   Wt 120 kg (264 lb)   BMI 42 61 kg/m²    General: Well groomed female, well appearing, in no apparent distress  Psychiatric: Alert and orientated; Cooperative; Speech:clear and coherent; Normal mood, affect & thought   HEENT:  Craniofacial anatomy: normal Sinuses: non- tender  TMJ: Normal    Eyes: EOM's intact;  conjunctiva/corneas clear   Ears: Externallyappear normal     Nasal Airway: is patent Septum:intact; Mucosa: normal; Turbinates: normal; Rhinorrhea: None   Mouth: Lips: normal posture; Dentition: normal   Mucosa:moist  ; Hard Palate:normal    Oropharryx: crowdedTongue: Mallampati:Class IV and MobileSoft Palate:  redundant  Tonsils: no hypertrophy  + postnasal drip  Neck: is thick; Neck Circumference: 16 5 "; Supple; no abnormal masses; Thyroid:normal  Trachea:central     Lymph: No Cervical or Submandibular Lymhadenopathy  Heart: S1,S2 normal; RRR; no gallop; nomurmurs   Lungs: Respiratory Effort:normal  Air entry good bilaterally  No wheezes  No rales  Abdomen: Obese, Soft & non-tender    Extremities:  Trace pedal edema  No clubbing or cyanosis  Skin: warm and dry; Color& Hydration good; no facial rashes or lesions   Neurological: CNII-XII intact; Motor normal; Sensation normal  Musculoskeletal: Muscle bulk, tone and power WNL Gait:normal        IMPRESSION: Primary, Secondary Sleep Diagnoses (to Medical or Psych conditions) & Comorbidities   1  Snoring  Home Study   2  Seasonal allergies     3  Postnasal drip     4  Morbid (severe) obesity due to excess calories [facility])  Ambulatory referral to Sleep Medicine    Home Study   5  Anxiety     6  Peripheral edema        PLAN:   1  Comprehensive counseling was provided on pathophysiology, diagnostic strategies & treatment options; effects on symptoms and comorbidities; risks of inadequate therapy; costs and insurance aspects     2  I advised on weight reduction, avoiding sleeping supine, using alcohol or sedating medications close to bed time and on safe driving practices  3  A diagnostic study will be scheduled - in her case home sleep testing would be adequate  4  Patient is willing to try Positive airway pressure therapy   5  Follow-up will be scheduled after the diagnostic study to review results, further details of treatment options and to initiate/adjust therapy  Thank you for allowing me to participate in the care of this patient  I will keep you apprised of developments          Sincerely,     Authenticated electronically by Irineo Barreto MD   on 02/83/94   Board Certified Specialist

## 2020-02-10 ENCOUNTER — TELEPHONE (OUTPATIENT)
Dept: BARIATRICS | Facility: CLINIC | Age: 35
End: 2020-02-10

## 2020-02-10 NOTE — TELEPHONE ENCOUNTER
Called number on file, but not working at this time  Emailed pt the following:    Saeed Damon,    This is Dalila, the RD from weight management  I just wanted to reach out and touch base to see how things are going with your pre-surgery to do list   I do see you have been getting a few things done since the insurance situation was straightened out  I wanted to see if you wanted to  make an appointment to come in for a quick appointment since you havent been here since the beginning of December  Please let me know when works for you  Again, we see follow ups on Monday, Tuesday and Fridays      Talk soon,        Isa Raymond, TALA, LDN  Surgical Dietitian detailed exam

## 2020-02-20 ENCOUNTER — HOSPITAL ENCOUNTER (OUTPATIENT)
Dept: SLEEP CENTER | Facility: CLINIC | Age: 35
Discharge: HOME | End: 2020-02-20
Payer: COMMERCIAL

## 2020-02-20 DIAGNOSIS — R06.83 SNORING: ICD-10-CM

## 2020-02-20 DIAGNOSIS — E66.01 MORBID (SEVERE) OBESITY DUE TO EXCESS CALORIES (HCC): ICD-10-CM

## 2020-02-20 PROCEDURE — 95806 SLEEP STUDY UNATT&RESP EFFT: CPT

## 2020-02-20 PROCEDURE — G0399 HOME SLEEP TEST/TYPE 3 PORTA: HCPCS

## 2020-02-21 ENCOUNTER — TELEPHONE (OUTPATIENT)
Dept: BARIATRICS | Facility: CLINIC | Age: 35
End: 2020-02-21

## 2020-02-21 NOTE — PROGRESS NOTES
Home Sleep Study Documentation    Pre-Sleep Home Study:    Set-up and instructions performed by: RIZWAN Davila    Technician performed demonstration for Patient: yes    Return demonstration performed by Patient: yes    Written instructions provided to Patient: yes    Patient signed consent form: yes        Post-Sleep Home Study:    Additional comments by Patient: None    Home Sleep Study Failed:no:    Failure reason: N/A    Reported or Detected: N/A    Scored by: MIKE Malone RPSGT

## 2020-02-21 NOTE — TELEPHONE ENCOUNTER
Returned phonecall:    Spoke with Marisol and asked if she had her labs done, she stated she had them done today at [address]  Suggested that she come in the office for a follow up and check in  We scheduled her for next Tuesday with Reena Gomez  Explained that as long as labs are good wwe can submit to insurance  Once she has cpap results if she does need to wear one she will have to use the cpap for 4 weeks before scheduling surgery  ----- Message from Jakub Rocha sent at 2/21/2020  1:01 PM EST -----  Regarding: Next Step  Aliyah Tanner! The aforementioned patient called and left us a message stating that she completed her sleep study yesterday  She was wondering what the next step in her process would be      [phone] is her callback number      Thank you so much in advance!    - Thedora Ahumada

## 2020-02-22 LAB
ALBUMIN SERPL-MCNC: 4.2 G/DL (ref 3.8–4.8)
ALBUMIN/GLOB SERPL: 1.5 {RATIO} (ref 1.2–2.2)
ALP SERPL-CCNC: 93 IU/L (ref 39–117)
ALT SERPL-CCNC: 17 IU/L (ref 0–32)
AST SERPL-CCNC: 13 IU/L (ref 0–40)
BILIRUB SERPL-MCNC: 0.3 MG/DL (ref 0–1.2)
BUN SERPL-MCNC: 11 MG/DL (ref 6–20)
BUN/CREAT SERPL: 12 (ref 9–23)
CALCIUM SERPL-MCNC: 9.3 MG/DL (ref 8.7–10.2)
CHLORIDE SERPL-SCNC: 98 MMOL/L (ref 96–106)
CHOLEST SERPL-MCNC: 195 MG/DL (ref 100–199)
CHOLEST/HDLC SERPL: 3.3 RATIO (ref 0–4.4)
CO2 SERPL-SCNC: 27 MMOL/L (ref 20–29)
CREAT SERPL-MCNC: 0.89 MG/DL (ref 0.57–1)
ERYTHROCYTE [DISTWIDTH] IN BLOOD BY AUTOMATED COUNT: 15.9 % (ref 11.7–15.4)
EST. AVERAGE GLUCOSE BLD GHB EST-MCNC: 111 MG/DL
GLOBULIN SER-MCNC: 2.8 G/DL (ref 1.5–4.5)
GLUCOSE SERPL-MCNC: 108 MG/DL (ref 65–99)
HBA1C MFR BLD: 5.5 % (ref 4.8–5.6)
HCT VFR BLD AUTO: 38.6 % (ref 34–46.6)
HDLC SERPL-MCNC: 59 MG/DL
HGB BLD-MCNC: 12.5 G/DL (ref 11.1–15.9)
LDLC SERPL CALC-MCNC: 103 MG/DL (ref 0–99)
MCH RBC QN AUTO: 24.6 PG (ref 26.6–33)
MCHC RBC AUTO-ENTMCNC: 32.4 G/DL (ref 31.5–35.7)
MCV RBC AUTO: 76 FL (ref 79–97)
PLATELET # BLD AUTO: 242 X10E3/UL (ref 150–450)
POTASSIUM SERPL-SCNC: 3.3 MMOL/L (ref 3.5–5.2)
PROT SERPL-MCNC: 7 G/DL (ref 6–8.5)
RBC # BLD AUTO: 5.09 X10E6/UL (ref 3.77–5.28)
SL AMB EGFR AFRICAN AMERICAN: 96 ML/MIN/1.73
SL AMB EGFR NON AFRICAN AMERICAN: 84 ML/MIN/1.73
SL AMB VLDL CHOLESTEROL CALC: 33 MG/DL (ref 5–40)
SODIUM SERPL-SCNC: 139 MMOL/L (ref 134–144)
TRIGL SERPL-MCNC: 167 MG/DL (ref 0–149)
TSH SERPL DL<=0.005 MIU/L-ACNC: 3.74 UIU/ML (ref 0.45–4.5)
WBC # BLD AUTO: 7.7 X10E3/UL (ref 3.4–10.8)

## 2020-02-25 ENCOUNTER — TELEPHONE (OUTPATIENT)
Dept: SLEEP CENTER | Facility: CLINIC | Age: 35
End: 2020-02-25

## 2020-02-25 ENCOUNTER — OFFICE VISIT (OUTPATIENT)
Dept: BARIATRICS | Facility: CLINIC | Age: 35
End: 2020-02-25

## 2020-02-25 VITALS — HEIGHT: 66 IN | BODY MASS INDEX: 42.81 KG/M2 | WEIGHT: 266.4 LBS

## 2020-02-25 DIAGNOSIS — Z98.84 BARIATRIC SURGERY STATUS: ICD-10-CM

## 2020-02-25 DIAGNOSIS — E66.01 MORBID (SEVERE) OBESITY DUE TO EXCESS CALORIES (HCC): Primary | ICD-10-CM

## 2020-02-25 PROCEDURE — RECHECK

## 2020-02-25 NOTE — TELEPHONE ENCOUNTER
Left message to call office  Sleep study reveals mild BERENICE, will schedule follow up to discuss treatment options

## 2020-02-25 NOTE — PROGRESS NOTES
Bariatric Follow Up Nutrition Note    Preop  6 Month Program (6 of 6 completed in october)    Type of surgery  Preop  Surgery Date: TBD  Surgeon: Dr Karen Grey  37 y o   female  Ht:  77"  Wt:  266 4lbs  BMI 43    Weight in (lb) to have BMI = 25: 155 6  Pre-Op Excess Wt: 110 2lb  Weight change: +4lbs since December (pt states she ran out of her water pill so hasn't taken if for about 5 days)  Net weight loss:  -6lbs    Review of History and Medications   Past Medical History:   Diagnosis Date    Allergic rhinitis     Anemia     ok currently    Anxiety     Asthma     activity induced    Benign neoplasm of skin of trunk     Polycystic ovary syndrome     Skin tag     Tinea versicolor      Past Surgical History:   Procedure Laterality Date    CERVICAL CERCLAGE       SECTION  2017    TONSILLECTOMY      TUBAL LIGATION  2017     Social History     Socioeconomic History    Marital status:      Spouse name: Not on file    Number of children: Not on file    Years of education: Not on file    Highest education level: Not on file   Occupational History    Not on file   Social Needs    Financial resource strain: Not on file    Food insecurity:     Worry: Not on file     Inability: Not on file    Transportation needs:     Medical: Not on file     Non-medical: Not on file   Tobacco Use    Smoking status: Never Smoker    Smokeless tobacco: Never Used   Substance and Sexual Activity    Alcohol use: Yes     Comment: rarely    Drug use: No    Sexual activity: Yes   Lifestyle    Physical activity:     Days per week: Not on file     Minutes per session: Not on file    Stress: Not on file   Relationships    Social connections:     Talks on phone: Not on file     Gets together: Not on file     Attends Restoration service: Not on file     Active member of club or organization: Not on file     Attends meetings of clubs or organizations: Not on file Relationship status: Not on file    Intimate partner violence:     Fear of current or ex partner: Not on file     Emotionally abused: Not on file     Physically abused: Not on file     Forced sexual activity: Not on file   Other Topics Concern    Not on file   Social History Narrative    Not on file       Current Outpatient Medications:     acetaminophen (TYLENOL) 325 mg tablet, Take 650 mg by mouth every 6 (six) hours as needed for mild pain, Disp: , Rfl:     al mag oxide-diphenhydramine-lidocaine viscous (MAGIC MOUTHWASH) 1:1:1 suspension, Swish and spit 10 mL every 4 (four) hours as needed for mouth pain or discomfort (Patient not taking: Reported on 11/26/2019), Disp: 1 Bottle, Rfl: 0    escitalopram (LEXAPRO) 10 mg tablet, TAKE 1 TABLET (10 MG TOTAL) BY MOUTH DAILY, Disp: 30 tablet, Rfl: 5    fluticasone (FLONASE) 50 mcg/act nasal spray, 2 sprays into each nostril daily (Patient taking differently: 2 sprays into each nostril as needed ), Disp: 16 g, Rfl: 0    hydrochlorothiazide (HYDRODIURIL) 25 mg tablet, TAKE 1 TABLET (25 MG TOTAL) BY MOUTH DAILY, Disp: 30 tablet, Rfl: 5    methylPREDNISolone 4 MG tablet therapy pack, Use as directed on package (Patient not taking: Reported on 11/26/2019), Disp: 21 each, Rfl: 0    Food Intake and Lifestyle Assessment   Food Intake Assessment completed via usual diet recall  Breakfast: on way to work between 7:30 and 8am:  protein shake only or yesterday:  Will have cheese stick  Snack: -  Lunch: 12pm:  bigger meal is lunch now since started school (going in the mornings now)  PBJ or leftover or eggs (2-3) ---if does hard boiled will not eat the yolk, handful olives, pickles, and cheese stick  Or PBJ and hard boiled eggs  Yesterday has a can of soup  Snack: -  Dinner: 5pm:  Last night hamburger on [employer] and about 15 french fries (in air fryer)    Other options would be:  3oz chicken and 1 cup of butter noodles (doesn't like many veggies, but is slowly trying them) or chicken tenders or homemade mac & cheese and 1 chicken leg or mac & cheese and two hot dogs or crockpot meals  Snack: not often  Stops eating at 5:30-6pm    Beverage intake: water and diet soda (1 per day, which is down from 64oz/day)  Diet texture/stage: regular  Protein supplement: having for breakfast  Estimated protein intake per day: 50-90gm  Estimated fluid intake per day: 64oz water per day, one diet soda per week  Meals eaten away from home: was doing well with avoiding the fast food, until this week  This week they ordered out often  She plans on going back to brining her lunch with her to work  Overall, her biggest changes has been watching portions  Typical meal pattern: 3 meals per day and 0-1 snacks per day  Eating Behaviors: Consumption of high calorie/ high fat foods, Large portion sizes and eating out often    Food allergies or intolerances: dislikes yogurt and veggies, even after trying multiple times  Cultural or Anglican considerations: none    Physical Assessment    Physical Activity  Wakes up at 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 and on the go all day with kids to school, work and now she started school 4 days per week and 1 online class which she normally does on the weekend  Types of exercise: No set exercise regimen  Meeting 10,000 steps per day during the work weeks  on the weekends a little harder because doing school work, but does plan on joining a gym because wants to get in to a routine for post-op  Current physical limitations: none    Psychosocial Assessment   Support systems: spouse and children  Socioeconomic factors: none    Nutrition Diagnosis  Diagnosis: Overweight / Obesity (NC-3 3)  Related to: Physical inactivity and Excessive energy intake  As Evidenced by: BMI >25     Interventions and Teaching   Patient educated on post-op nutrition guidelines  Patient educated and handouts provided      Meal planning and preparation  Intuitive eating  Techniques for self monitoring and keeping daily food journal  Measuring portions  Snacks    Education provided to: patient    Barriers to learning: No barriers identified    Readiness to change: preparation    Comprehension: verbalizes understanding     Expected Compliance: good    Evaluation / Monitoring  Eating pattern as discussed Body weight Physical activity     Reviewed labs with pt from 2/21 and advised acceptable for surgery  Advised that glucose was elevated at 108  Pt did report she was fasting therefore informed her that it should be under 100, but A1c at 5 5  H/H and TSH acceptable for surgery  At this time suggested to start general multi with 18mg iron to optimize prior to surgery  6 months of weight checks (6 of 6 as Oct)   Labs: 2/21/20-WNL for sx   Sleep: had sleep study on 2/20-awaiting results   Cardio:  6/10/19 cleared   Meet w/surgeon:  12/6/19   EGD: 1/17/20   Support Group:  Oct 3rd   Weight loss: 5% = -13 57lbs (257 83lbs)  BMI 40 to qualify if not dx with BERENICE = 248lbs    BMI 35 to qualify if does have BERENICE = 217lbs      Pt's insurance was reinstated  She was last in the office on December 6 to meet with the surgeon  She has had a 4lb weight gain since then, but states she ran out of her water pill so hasn't been taking it for the past 5 days  Pt does still have a net weight loss of 6 lbs, but would still like her to lose the gained weight before we submit  Also, still awaiting sleep study results  Pt states she has been following the meal schedule and is pretty consistent with what she eats each day, particularly at breakfast and lunch  She is going to f/u with her PCP about her water pill  Pt will f/u here in 2 weeks  Reviewed post-op vitamins and provided with samples       Goals  Food journal via [employer]  10,000 steps per day  Look into joining a gym  Complete lesson plans 1-6  Eat 3 meals per day with protein at each meal  Continue to avoid mindless snacking  Continue to replace breakfast with a protien shake, including on the weekends  Continue with the 30/60 rule  Try vitamin samples     Time Spent:   30 Minutes

## 2020-02-26 NOTE — TELEPHONE ENCOUNTER
Patient returned my call, advised above  Scheduled follow up to discuss further, 3/26/20 at [facility]

## 2020-03-07 ENCOUNTER — OFFICE VISIT (OUTPATIENT)
Dept: SLEEP CENTER | Facility: CLINIC | Age: 35
End: 2020-03-07
Payer: COMMERCIAL

## 2020-03-07 VITALS
WEIGHT: 266 LBS | SYSTOLIC BLOOD PRESSURE: 121 MMHG | HEIGHT: 66 IN | BODY MASS INDEX: 42.75 KG/M2 | HEART RATE: 58 BPM | DIASTOLIC BLOOD PRESSURE: 85 MMHG

## 2020-03-07 DIAGNOSIS — G25.81 RESTLESS LEG SYNDROME: ICD-10-CM

## 2020-03-07 DIAGNOSIS — G47.33 OSA (OBSTRUCTIVE SLEEP APNEA): Primary | ICD-10-CM

## 2020-03-07 DIAGNOSIS — J30.2 SEASONAL ALLERGIES: ICD-10-CM

## 2020-03-07 DIAGNOSIS — F41.9 ANXIETY: ICD-10-CM

## 2020-03-07 DIAGNOSIS — E66.01 CLASS 3 SEVERE OBESITY WITHOUT SERIOUS COMORBIDITY WITH BODY MASS INDEX (BMI) OF 40.0 TO 44.9 IN ADULT, UNSPECIFIED OBESITY TYPE (HCC): ICD-10-CM

## 2020-03-07 PROCEDURE — 3079F DIAST BP 80-89 MM HG: CPT | Performed by: INTERNAL MEDICINE

## 2020-03-07 PROCEDURE — 3008F BODY MASS INDEX DOCD: CPT | Performed by: INTERNAL MEDICINE

## 2020-03-07 PROCEDURE — 3074F SYST BP LT 130 MM HG: CPT | Performed by: INTERNAL MEDICINE

## 2020-03-07 PROCEDURE — 99214 OFFICE O/P EST MOD 30 MIN: CPT | Performed by: INTERNAL MEDICINE

## 2020-03-07 PROCEDURE — 1036F TOBACCO NON-USER: CPT | Performed by: INTERNAL MEDICINE

## 2020-03-07 NOTE — PROGRESS NOTES
Review of Systems      Genitourinary excessive blood loss during menses   Cardiology ankle/leg swelling   Gastrointestinal none   Neurology frequent headaches and need to move extremities   Constitutional none   Integumentary itching   Psychiatry anxiety   Musculoskeletal back pain, legs twitching/jerking and sciatica   Pulmonary none   ENT throat clearing   Endocrine none   Hematological none

## 2020-03-07 NOTE — PROGRESS NOTES
Follow-up Note - Sleep Center   Marisol Weber  37 y o  female  HGH:[birth year]  PSY:[id number]    I saw Chepe Duran in sleep clinic today for her sleep complaints & comorbidities  Home sleep testing was undertaken to evaluate for sleep disordered breathing and patient is here to review results and further options  The study demonstrated : MAHOGANY (respiratory event index of) 9 /hour  Minimum oxygen saturation was 85%  1% of the study was spent with saturations less than 90%  The snore index was 34 4%  PFSH, Problem List, Medications & Allergies were reviewed in EMR  Interval changes: none reported  She  has a past medical history of Allergic rhinitis, Anemia (2012), Anxiety, Asthma, Benign neoplasm of skin of trunk, Polycystic ovary syndrome, Skin tag, and Tinea versicolor  She has a current medication list which includes the following prescription(s): acetaminophen, al mag oxide-diphenhydramine-lidocaine viscous, escitalopram, fluticasone, hydrochlorothiazide, and methylprednisolone  ROS: reviewed see attached  Significant for allergies and anxiety are controlled  Werner Santos HPI:  She continues to report infrequent snoring  Recently she has had an escalation of restless leg symptoms that are controlled with nonpharmacologic measures  Sleep Routine:  She is getting 8 hours of interrupted sleep  She moves excessively during sleep  She awakens the aid of an alarm and is not always refreshed  She denied excessive drowsiness and rated herself at Total score: 1 /24 on the Ashley sleepiness scale  Habits:  reports that she has never smoked  She has never used smokeless tobacco  She reports that she drinks alcohol  She reports that she does not use drugs  EXAM: /85   Pulse 58   Ht 5' 6" (1 676 m)   Wt 121 kg (266 lb)   BMI 42 93 kg/m²     Patient is well groomed; well appearing  Skin/Extrem: warm & dry; col & hydration normal; trace pedal edema  Psych: cooperativeand in no distress  Mental state appears normal   CNS: Alert, orientated, clear & coherent speech  H&N: EOMI; NC/AT:no facial pressure marks, no rashes  ENMT Mucosa appearsnormal Nasal airway:patent  Oral airway:  crowded  Resp:effort is normal CVS: RRR ABD: truncal obesity MSK:Gait normal     IMPRESSION: Primary Sleep/Secondary(to Medical or Psych conditions) & comorbidities   1  BERENICE (obstructive sleep apnea)     2  Restless leg syndrome     3  Class 3 severe obesity without serious comorbidity with body mass index (BMI) of 40 0 to 44 9 in adult, unspecified obesity type ([address] )     4  Seasonal allergies     5  Anxiety         PLAN:    1  I reviewed results of the Sleep studies with the patient  2  With respect to above conditions, I counseled on pathophysiology, diagnosis, treatment options, risks and benefits; inter-relationship and effects on symptoms and comorbidities; risks of no treatment; costs and insurance aspects  3  Patient elected positive airway pressure therapy and I provided a prescription for auto titrating Pap 5-15 cm H2O   4  Need for compliance with therapy and weight reduction were emphasized  5  She is planned for bariatric surgery and use of CPAP for 2 weeks prior to the surgery should be sufficient  She was instructed to carry her machine for use perioperatively and to continue postoperatively  6  No medication was initiated for the restless leg symptoms  Consideration to be given to an alternate to Lexapro that may aggravate restless leg symptoms  7  Follow-up to be scheduled in 2 months to monitor compliance, progress and to adjust therapy  Thank you for allowing me to participate in the care of this patient  I will keep you apprised of developments      Sincerely,    Authenticated electronically by Angie Alfaro MD on 52/37/80   Board Certified Specialist

## 2020-03-07 NOTE — PATIENT INSTRUCTIONS

## 2020-03-09 ENCOUNTER — TELEPHONE (OUTPATIENT)
Dept: SLEEP CENTER | Facility: CLINIC | Age: 35
End: 2020-03-09

## 2020-03-16 ENCOUNTER — OFFICE VISIT (OUTPATIENT)
Dept: BARIATRICS | Facility: CLINIC | Age: 35
End: 2020-03-16

## 2020-03-16 VITALS — WEIGHT: 261.2 LBS | BODY MASS INDEX: 41.98 KG/M2 | HEIGHT: 66 IN

## 2020-03-16 DIAGNOSIS — E66.01 MORBID (SEVERE) OBESITY DUE TO EXCESS CALORIES (HCC): Primary | ICD-10-CM

## 2020-03-16 DIAGNOSIS — Z98.84 BARIATRIC SURGERY STATUS: ICD-10-CM

## 2020-03-16 PROCEDURE — RECHECK

## 2020-03-16 NOTE — PROGRESS NOTES
Bariatric Follow Up Nutrition Note    Preop  6 Month Program (6 of 6 completed in october)    Type of surgery  Preop  Surgery Date: TBD  Surgeon: Dr Kristi Frank  37 y o   female  Ht:  77"  Wt:  261 2lbs  BMI 42 2    Weight in (lb) to have BMI = 25: 155 6  Pre-Op Excess Wt: 110 2lb  Weight change:  -5 2 since   Net weight loss:  -11 2lbs    Review of History and Medications   Past Medical History:   Diagnosis Date    Allergic rhinitis     Anemia 2012    ok currently    Anxiety     Asthma     activity induced    Benign neoplasm of skin of trunk     Polycystic ovary syndrome     Skin tag     Tinea versicolor      Past Surgical History:   Procedure Laterality Date    CERVICAL CERCLAGE       SECTION  2017    TONSILLECTOMY      TUBAL LIGATION       Social History     Socioeconomic History    Marital status:      Spouse name: Not on file    Number of children: Not on file    Years of education: Not on file    Highest education level: Not on file   Occupational History    Not on file   Social Needs    Financial resource strain: Not on file    Food insecurity:     Worry: Not on file     Inability: Not on file    Transportation needs:     Medical: Not on file     Non-medical: Not on file   Tobacco Use    Smoking status: Never Smoker    Smokeless tobacco: Never Used   Substance and Sexual Activity    Alcohol use: Yes     Comment: rarely    Drug use: No    Sexual activity: Yes   Lifestyle    Physical activity:     Days per week: Not on file     Minutes per session: Not on file    Stress: Not on file   Relationships    Social connections:     Talks on phone: Not on file     Gets together: Not on file     Attends Pentecostalism service: Not on file     Active member of club or organization: Not on file     Attends meetings of clubs or organizations: Not on file     Relationship status: Not on file    Intimate partner violence:     Fear of current or ex partner: Not on file     Emotionally abused: Not on file     Physically abused: Not on file     Forced sexual activity: Not on file   Other Topics Concern    Not on file   Social History Narrative    Not on file       Current Outpatient Medications:     acetaminophen (TYLENOL) 325 mg tablet, Take 650 mg by mouth every 6 (six) hours as needed for mild pain, Disp: , Rfl:     al mag oxide-diphenhydramine-lidocaine viscous (MAGIC MOUTHWASH) 1:1:1 suspension, Swish and spit 10 mL every 4 (four) hours as needed for mouth pain or discomfort (Patient not taking: Reported on 11/26/2019), Disp: 1 Bottle, Rfl: 0    escitalopram (LEXAPRO) 10 mg tablet, TAKE 1 TABLET (10 MG TOTAL) BY MOUTH DAILY, Disp: 30 tablet, Rfl: 5    fluticasone (FLONASE) 50 mcg/act nasal spray, 2 sprays into each nostril daily (Patient taking differently: 2 sprays into each nostril as needed ), Disp: 16 g, Rfl: 0    hydrochlorothiazide (HYDRODIURIL) 25 mg tablet, TAKE 1 TABLET (25 MG TOTAL) BY MOUTH DAILY, Disp: 30 tablet, Rfl: 5    methylPREDNISolone 4 MG tablet therapy pack, Use as directed on package (Patient not taking: Reported on 11/26/2019), Disp: 21 each, Rfl: 0    Food Intake and Lifestyle Assessment   Food Intake Assessment completed via usual diet recall  Breakfast: on way to work between 7:30 and 8am: will have meal ie:  Bagel with cream cheese or eggs   Snack: -  Lunch: 12pm:  Protein shake  Snack: -  Dinner: Sunday-light dinner Saturday: andree dahl because was out and about-cheese burger  Other options would be:  3oz chicken and 1 cup of butter noodles (doesn't like many veggies, but is slowly trying them) or chicken tenders or homemade mac & cheese and 1 chicken leg or mac & cheese and two hot dogs or crockpot meals  Starting to try more fruist and veggies  Snack: not often    Stops eating at 5:30-6pm    Beverage intake: water and diet soda (1 per day, which is down from 64oz/day)  Diet texture/stage: regular  Protein supplement: having for breakfast  Estimated protein intake per day: 50-90gm  Estimated fluid intake per day: 64oz water per day, one diet soda per week  Meals eaten away from home: was doing well with avoiding the fast food, until this week  This week they ordered out often  She plans on going back to brining her lunch with her to work  Overall, her biggest changes has been watching portions  Typical meal pattern: 3 meals per day and 0-1 snacks per day  Eating Behaviors: Consumption of high calorie/ high fat foods, Large portion sizes and eating out often    Food allergies or intolerances: dislikes yogurt and veggies, even after trying multiple times  Cultural or Temple considerations: none    Physical Assessment    Physical Activity  Wakes up at 6am and on the go all day with kids to school, work and now she started school 4 days per week and 1 online class which she normally does on the weekend  Types of exercise: No set exercise regimen  Meeting 10,000 steps per day during the work weeks  on the weekends a little harder because doing school work, but does plan on joining a gym because wants to get in to a routine for post-op  Current physical limitations: none    Psychosocial Assessment   Support systems: spouse and children  Socioeconomic factors: none    Nutrition Diagnosis  Diagnosis: Overweight / Obesity (NC-3 3)  Related to: Physical inactivity and Excessive energy intake  As Evidenced by: BMI >25     Interventions and Teaching   Patient educated on post-op nutrition guidelines  Patient educated and handouts provided      Meal planning and preparation  Intuitive eating  Techniques for self monitoring and keeping daily food journal  Measuring portions  Snacks    Education provided to: patient    Barriers to learning: No barriers identified    Readiness to change: preparation    Comprehension: verbalizes understanding     Expected Compliance: good    Evaluation / Monitoring  Eating pattern as discussed Body weight Physical activity       6 months of weight checks (6 of 6 as Oct)   Labs: 2/21/20-WNL for sx   Sleep: had sleep study on 2/20, f/u visit was on 3/7-found with mild BERENICE  Going to call DME today to get CPAP  Cardio:  6/10/19 cleared   Meet w/surgeon:  12/6/19   EGD: 1/17/20   Support Group:  Oct 3rd   Weight loss: 5% = -13 57lbs (257 83lbs)   BMI 35 to qualify if does have BERENICE = 217lbs      Pt's insurance was reinstated  She has lost the little weight, and then some, that she gained between between December and February  She started back on her water pill and has been remaining mindful of her food intake  She is using the protein shake as a meal replacement for lunch at this time  Reviewed workflow and pt has completed all requirements and can now be submitted to insurance for approval   Advised pt that Lambert Bosworth, , will contact her to schedule surgery and necessary appointments once she gets approval from [employer], which could take up to 4 weeks  Pt verbalized understanding  Advised pt to continue with weight loss      Goals  Food journal via [employer]  10,000 steps per day  Complete lesson plans 1-6  Eat 3 meals per day with protein at each meal  Continue to avoid mindless snacking  Continue to replace breakfast with a protien shake, including on the weekends  Continue with the 30/60 rule  Try vitamin samples     Time Spent:   30 Minutes

## 2020-03-24 NOTE — TELEPHONE ENCOUNTER
I spoke with patient, scheduled DME set up 4/14/2020 at [facility] representative aware      Patient already has compliance scheduled 6/6/2020

## 2020-04-13 DIAGNOSIS — R60.9 PERIPHERAL EDEMA: ICD-10-CM

## 2020-04-13 RX ORDER — HYDROCHLOROTHIAZIDE 25 MG/1
TABLET ORAL
Qty: 30 TABLET | Refills: 5 | Status: SHIPPED | OUTPATIENT
Start: 2020-04-13 | End: 2020-06-30

## 2020-04-14 ENCOUNTER — TELEPHONE (OUTPATIENT)
Dept: BARIATRICS | Facility: CLINIC | Age: 35
End: 2020-04-14

## 2020-04-16 ENCOUNTER — TELEPHONE (OUTPATIENT)
Dept: SLEEP CENTER | Facility: CLINIC | Age: 35
End: 2020-04-16

## 2020-05-04 ENCOUNTER — TELEPHONE (OUTPATIENT)
Dept: BARIATRICS | Facility: CLINIC | Age: 35
End: 2020-05-04

## 2020-05-15 ENCOUNTER — TELEPHONE (OUTPATIENT)
Dept: BARIATRICS | Facility: CLINIC | Age: 35
End: 2020-05-15

## 2020-05-29 ENCOUNTER — PREP FOR PROCEDURE (OUTPATIENT)
Dept: BARIATRICS | Facility: CLINIC | Age: 35
End: 2020-05-29

## 2020-05-29 DIAGNOSIS — E66.01 MORBID OBESITY (HCC): Primary | ICD-10-CM

## 2020-05-29 DIAGNOSIS — G47.33 OBSTRUCTIVE SLEEP APNEA: ICD-10-CM

## 2020-06-17 ENCOUNTER — OFFICE VISIT (OUTPATIENT)
Dept: FAMILY MEDICINE CLINIC | Facility: CLINIC | Age: 35
End: 2020-06-17
Payer: OTHER MISCELLANEOUS

## 2020-06-17 VITALS
DIASTOLIC BLOOD PRESSURE: 76 MMHG | OXYGEN SATURATION: 100 % | WEIGHT: 260 LBS | HEART RATE: 62 BPM | RESPIRATION RATE: 16 BRPM | TEMPERATURE: 98.6 F | BODY MASS INDEX: 41.78 KG/M2 | SYSTOLIC BLOOD PRESSURE: 116 MMHG | HEIGHT: 66 IN

## 2020-06-17 DIAGNOSIS — S69.91XA INJURY OF RIGHT WRIST, INITIAL ENCOUNTER: ICD-10-CM

## 2020-06-17 DIAGNOSIS — S69.91XA INJURY OF RIGHT HAND, INITIAL ENCOUNTER: Primary | ICD-10-CM

## 2020-06-17 PROCEDURE — 3008F BODY MASS INDEX DOCD: CPT | Performed by: NURSE PRACTITIONER

## 2020-06-17 PROCEDURE — 1036F TOBACCO NON-USER: CPT | Performed by: NURSE PRACTITIONER

## 2020-06-17 PROCEDURE — 99214 OFFICE O/P EST MOD 30 MIN: CPT | Performed by: NURSE PRACTITIONER

## 2020-06-17 RX ORDER — BACLOFEN 10 MG/1
10 TABLET ORAL 2 TIMES DAILY
Qty: 30 TABLET | Refills: 0 | Status: SHIPPED | OUTPATIENT
Start: 2020-06-17 | End: 2020-06-30

## 2020-06-19 ENCOUNTER — APPOINTMENT (OUTPATIENT)
Dept: RADIOLOGY | Facility: CLINIC | Age: 35
End: 2020-06-19
Payer: OTHER MISCELLANEOUS

## 2020-06-19 ENCOUNTER — OFFICE VISIT (OUTPATIENT)
Dept: BARIATRICS | Facility: CLINIC | Age: 35
End: 2020-06-19

## 2020-06-19 DIAGNOSIS — Z98.84 BARIATRIC SURGERY STATUS: ICD-10-CM

## 2020-06-19 DIAGNOSIS — S69.91XA INJURY OF RIGHT WRIST, INITIAL ENCOUNTER: ICD-10-CM

## 2020-06-19 DIAGNOSIS — E66.01 MORBID (SEVERE) OBESITY DUE TO EXCESS CALORIES (HCC): Primary | ICD-10-CM

## 2020-06-19 DIAGNOSIS — S69.91XA INJURY OF RIGHT HAND, INITIAL ENCOUNTER: ICD-10-CM

## 2020-06-19 PROCEDURE — RECHECK

## 2020-06-19 PROCEDURE — 73110 X-RAY EXAM OF WRIST: CPT

## 2020-06-19 PROCEDURE — 73130 X-RAY EXAM OF HAND: CPT

## 2020-06-24 ENCOUNTER — OFFICE VISIT (OUTPATIENT)
Dept: FAMILY MEDICINE CLINIC | Facility: CLINIC | Age: 35
End: 2020-06-24
Payer: OTHER MISCELLANEOUS

## 2020-06-24 ENCOUNTER — TELEPHONE (OUTPATIENT)
Dept: FAMILY MEDICINE CLINIC | Facility: CLINIC | Age: 35
End: 2020-06-24

## 2020-06-24 VITALS
HEIGHT: 66 IN | DIASTOLIC BLOOD PRESSURE: 78 MMHG | BODY MASS INDEX: 41.46 KG/M2 | SYSTOLIC BLOOD PRESSURE: 120 MMHG | TEMPERATURE: 97.5 F | RESPIRATION RATE: 16 BRPM | HEART RATE: 72 BPM | WEIGHT: 258 LBS

## 2020-06-24 DIAGNOSIS — S69.91XD INJURY OF RIGHT HAND, SUBSEQUENT ENCOUNTER: Primary | ICD-10-CM

## 2020-06-24 DIAGNOSIS — S69.91XD INJURY OF RIGHT WRIST, SUBSEQUENT ENCOUNTER: ICD-10-CM

## 2020-06-24 PROCEDURE — 99213 OFFICE O/P EST LOW 20 MIN: CPT | Performed by: NURSE PRACTITIONER

## 2020-06-24 PROCEDURE — 3008F BODY MASS INDEX DOCD: CPT | Performed by: NURSE PRACTITIONER

## 2020-06-24 PROCEDURE — 1036F TOBACCO NON-USER: CPT | Performed by: NURSE PRACTITIONER

## 2020-06-24 RX ORDER — PREDNISONE 10 MG/1
TABLET ORAL
Qty: 20 TABLET | Refills: 0 | Status: SHIPPED | OUTPATIENT
Start: 2020-06-24 | End: 2020-06-30

## 2020-06-30 ENCOUNTER — TELEPHONE (OUTPATIENT)
Dept: BARIATRICS | Facility: CLINIC | Age: 35
End: 2020-06-30

## 2020-06-30 ENCOUNTER — CONSULT (OUTPATIENT)
Dept: CARDIOLOGY CLINIC | Facility: CLINIC | Age: 35
End: 2020-06-30
Payer: COMMERCIAL

## 2020-06-30 VITALS
DIASTOLIC BLOOD PRESSURE: 78 MMHG | SYSTOLIC BLOOD PRESSURE: 106 MMHG | HEIGHT: 66 IN | TEMPERATURE: 98.3 F | WEIGHT: 259 LBS | OXYGEN SATURATION: 98 % | HEART RATE: 66 BPM | BODY MASS INDEX: 41.62 KG/M2

## 2020-06-30 DIAGNOSIS — M79.89 LEG SWELLING: ICD-10-CM

## 2020-06-30 DIAGNOSIS — R60.9 PERIPHERAL EDEMA: ICD-10-CM

## 2020-06-30 DIAGNOSIS — Z01.818 PRE-OP EXAM: Primary | ICD-10-CM

## 2020-06-30 DIAGNOSIS — E66.01 CLASS 3 SEVERE OBESITY WITHOUT SERIOUS COMORBIDITY WITH BODY MASS INDEX (BMI) OF 40.0 TO 44.9 IN ADULT, UNSPECIFIED OBESITY TYPE (HCC): ICD-10-CM

## 2020-06-30 PROCEDURE — 99243 OFF/OP CNSLTJ NEW/EST LOW 30: CPT | Performed by: INTERNAL MEDICINE

## 2020-06-30 PROCEDURE — 93000 ELECTROCARDIOGRAM COMPLETE: CPT | Performed by: INTERNAL MEDICINE

## 2020-06-30 RX ORDER — POTASSIUM CHLORIDE 20 MEQ/1
20 TABLET, EXTENDED RELEASE ORAL DAILY
Qty: 30 TABLET | Refills: 1 | Status: SHIPPED | OUTPATIENT
Start: 2020-06-30 | End: 2020-07-21 | Stop reason: HOSPADM

## 2020-06-30 RX ORDER — FUROSEMIDE 20 MG/1
20 TABLET ORAL DAILY PRN
Qty: 30 TABLET | Refills: 1 | Status: SHIPPED | OUTPATIENT
Start: 2020-06-30 | End: 2020-07-21 | Stop reason: HOSPADM

## 2020-07-07 ENCOUNTER — TELEPHONE (OUTPATIENT)
Dept: BARIATRICS | Facility: CLINIC | Age: 35
End: 2020-07-07

## 2020-07-07 NOTE — TELEPHONE ENCOUNTER
1  Do you presently have a fever or flu-like symptoms? NO  2  Do you have symptoms of an upper respiratory infection like runny nose, sore throat, or cough? NO  3  Are you suffering from new headache that you have not had in the past?  NO  4  Do you have/have you experienced any new shortness of breath recently? NO  5  Do you have any new diarrhea, nausea or vomiting? NOHave you been in contact with anyone who has been sick or diagnosed with COVID-19?  NO

## 2020-07-08 ENCOUNTER — OFFICE VISIT (OUTPATIENT)
Dept: BARIATRICS | Facility: CLINIC | Age: 35
End: 2020-07-08
Payer: COMMERCIAL

## 2020-07-08 VITALS
SYSTOLIC BLOOD PRESSURE: 118 MMHG | HEART RATE: 61 BPM | DIASTOLIC BLOOD PRESSURE: 72 MMHG | HEIGHT: 66 IN | TEMPERATURE: 96.1 F | BODY MASS INDEX: 41.82 KG/M2 | WEIGHT: 260.2 LBS | RESPIRATION RATE: 16 BRPM

## 2020-07-08 DIAGNOSIS — E66.01 MORBID (SEVERE) OBESITY DUE TO EXCESS CALORIES (HCC): ICD-10-CM

## 2020-07-08 DIAGNOSIS — Z01.818 PREOP TESTING: ICD-10-CM

## 2020-07-08 DIAGNOSIS — G47.33 OSA (OBSTRUCTIVE SLEEP APNEA): ICD-10-CM

## 2020-07-08 DIAGNOSIS — E66.01 MORBID (SEVERE) OBESITY DUE TO EXCESS CALORIES (HCC): Primary | ICD-10-CM

## 2020-07-08 DIAGNOSIS — E28.2 PCOS (POLYCYSTIC OVARIAN SYNDROME): ICD-10-CM

## 2020-07-08 DIAGNOSIS — E66.01 CLASS 3 SEVERE OBESITY WITHOUT SERIOUS COMORBIDITY WITH BODY MASS INDEX (BMI) OF 40.0 TO 44.9 IN ADULT (HCC): ICD-10-CM

## 2020-07-08 DIAGNOSIS — R60.9 PERIPHERAL EDEMA: ICD-10-CM

## 2020-07-08 DIAGNOSIS — Z01.818 ENCOUNTER FOR OTHER PREPROCEDURAL EXAMINATION: Primary | ICD-10-CM

## 2020-07-08 PROCEDURE — 3008F BODY MASS INDEX DOCD: CPT | Performed by: SURGERY

## 2020-07-08 PROCEDURE — 3078F DIAST BP <80 MM HG: CPT | Performed by: SURGERY

## 2020-07-08 PROCEDURE — 99213 OFFICE O/P EST LOW 20 MIN: CPT | Performed by: SURGERY

## 2020-07-08 PROCEDURE — 3074F SYST BP LT 130 MM HG: CPT | Performed by: SURGERY

## 2020-07-08 PROCEDURE — 1036F TOBACCO NON-USER: CPT | Performed by: SURGERY

## 2020-07-08 RX ORDER — ACETAMINOPHEN 325 MG/1
975 TABLET ORAL ONCE
Status: CANCELLED | OUTPATIENT
Start: 2020-07-08 | End: 2020-07-08

## 2020-07-08 RX ORDER — GABAPENTIN 100 MG/1
600 CAPSULE ORAL ONCE
Status: CANCELLED | OUTPATIENT
Start: 2020-07-08 | End: 2020-07-08

## 2020-07-08 RX ORDER — OMEPRAZOLE 20 MG/1
20 TABLET, DELAYED RELEASE ORAL DAILY
Qty: 90 TABLET | Refills: 1 | Status: ON HOLD | OUTPATIENT
Start: 2020-07-08 | End: 2020-07-21 | Stop reason: SDUPTHER

## 2020-07-08 RX ORDER — OXYCODONE HYDROCHLORIDE 5 MG/1
5 TABLET ORAL EVERY 4 HOURS PRN
Qty: 10 TABLET | Refills: 0 | Status: SHIPPED | OUTPATIENT
Start: 2020-07-08 | End: 2020-10-09 | Stop reason: ALTCHOICE

## 2020-07-08 RX ORDER — CELECOXIB 200 MG/1
200 CAPSULE ORAL ONCE
Status: CANCELLED | OUTPATIENT
Start: 2020-07-08 | End: 2020-07-08

## 2020-07-08 RX ORDER — LEVOFLOXACIN 5 MG/ML
750 INJECTION, SOLUTION INTRAVENOUS ONCE
Status: CANCELLED | OUTPATIENT
Start: 2020-07-08 | End: 2020-07-08

## 2020-07-08 RX ORDER — HEPARIN SODIUM 5000 [USP'U]/ML
5000 INJECTION, SOLUTION INTRAVENOUS; SUBCUTANEOUS
Status: CANCELLED | OUTPATIENT
Start: 2020-07-08 | End: 2020-07-09

## 2020-07-08 RX ORDER — SCOLOPAMINE TRANSDERMAL SYSTEM 1 MG/1
1 PATCH, EXTENDED RELEASE TRANSDERMAL ONCE
Status: CANCELLED | OUTPATIENT
Start: 2020-07-08 | End: 2020-07-08

## 2020-07-08 NOTE — PROGRESS NOTES
H&P Exam - Bariatric Surgery   Josh Payne 38 y o  female MRN: [id number]  Unit/Bed#:  Encounter: [id number]      HPI:    38 y o  yo morbidly obese female found to be a good candidate to undergo a weight loss operation upon being enrolled here at the [facility]   She has been pre certified to undergo a Laparoscopic analilia-en-y gastric bypass  Here today to review her pre op test results  Has been medically cleared for the procedure  Disease Complications: osteoarthritis, obstructive sleep apnea and chronic venous stasis    I have discussed with her at length the risks and benefits of the operation and reiterated the components of our multidisciplinary program and the importance of compliance and follow up in the post operative period  Although there is a great statistical chance of improvement or even resolution of most of her associated comorbidities, the results vary from patient to patient and they largely depend on his commitment  The patient was also instructed with regards to the importance of behavior modification, nutritional counseling, support meeting attendance and lifestyle changes that are important to ensure success  She was given the opportunity to ask questions and I have answered all of them  I have addressed with the patient the level of CODE STATUS for this hospital stay and after explaining the different options currently she wishes to be a Level I  She understands and wishes to proceed  She has lost all the weight required prior to surgery  Review of Systems   Cardiovascular: Positive for leg swelling  All other systems reviewed and are negative        Historical Information   Past Medical History:   Diagnosis Date    Allergic rhinitis     Anemia 2012    ok currently    Anxiety     Asthma     activity induced    Benign neoplasm of skin of trunk     Morbid obesity with BMI of 40 0-44 9, adult (HCC)     Polycystic ovary syndrome     Skin tag  Tinea versicolor      Past Surgical History:   Procedure Laterality Date    CERVICAL CERCLAGE       SECTION  2017    TONSILLECTOMY      TUBAL LIGATION  2017     Social History   Social History     Substance and Sexual Activity   Alcohol Use Yes    Comment: rarely     Social History     Substance and Sexual Activity   Drug Use No     Social History     Tobacco Use   Smoking Status Never Smoker   Smokeless Tobacco Never Used     Family History: Morbid obesity (mother RYGB)    Meds/Allergies   all medications and allergies reviewed  Allergies   Allergen Reactions    Valtrex [Valacyclovir] Swelling    Amoxicillin      Tingling of mouth        Objective     Current Vitals:   Blood Pressure: 118/72 (20)  Pulse: 61 (20)  Temperature: (!) 96 1 °F (35 6 °C) (20)  Temp Source: Tympanic (20)  Respirations: 16 (20)  Height: 5' 6" (167 6 cm) (20)  Weight - Scale: 118 kg (260 lb 3 2 oz) (20)        Physical Exam   Constitutional: She is oriented to person, place, and time  She appears well-developed and well-nourished  HENT:   Head: Atraumatic  Eyes: EOM are normal    Neck: Neck supple  Cardiovascular: Normal rate  Pulmonary/Chest: Effort normal and breath sounds normal  No respiratory distress  Abdominal: Soft  She exhibits no distension  There is no tenderness  Musculoskeletal: Normal range of motion  Neurological: She is alert and oriented to person, place, and time  Skin: Skin is warm and dry  Psychiatric: She has a normal mood and affect  Her behavior is normal    Vitals reviewed  Lab Results: I have personally reviewed pertinent lab results  Imaging: I have personally reviewed pertinent reports  EKG, Pathology, and Other Studies: I have personally reviewed pertinent reports        Code Status: Level 1    Assessment:  38 y o  yo morbidly obese female found to be a good candidate to undergo a weight loss operation upon being enrolled here at the [facility]  She has completed all of the preoperative process for bariatric surgery      Plan:  - Plan for laparoscopic possible open analilia-en-y gastric bypass with intraoperative EGD, possible sleeve gastrectomy, possible hiatal hernia repair   - consent signed  - preop orders placed  - (244)

## 2020-07-08 NOTE — LETTER
July 8, 2020     Luiza Griffiths, [address]    Patient: Murali Mae   YOB: 1985   Date of Visit: 7/8/2020       Dear Dr Walker Farr: Thank you for referring Abe Castleman to me for evaluation for metabolic and bariatric surgery  Below are my notes for her final H&P (scheduled for analilia-en-y gastric bypass 7/20/20)  If you have questions, please do not hesitate to call me  I look forward to following your patient along with you  Sincerely,        Chikis Aviles MD        CC: No Recipients  Chikis Aviles MD  7/8/2020  9:29 AM  Sign at close encounter  H&P Exam - Bariatric Surgery   Marisol Weber 38 y o  female MRN: [id number]  Unit/Bed#:  Encounter: [id number]      HPI:    38 y o  yo morbidly obese female found to be a good candidate to undergo a weight loss operation upon being enrolled here at the [facility]   She has been pre certified to undergo a Laparoscopic analilia-en-y gastric bypass  Here today to review her pre op test results  Has been medically cleared for the procedure  Disease Complications: osteoarthritis , obstructive sleep apnea and chronic venous stasis    I have discussed with her at length the risks and benefits of the operation and reiterated the components of our multidisciplinary program and the importance of compliance and follow up in the post operative period  Although there is a great statistical chance of improvement or even resolution of most of her associated comorbidities, the results vary from patient to patient and they largely depend on his commitment  The patient was also instructed with regards to the importance of behavior modification, nutritional counseling, support meeting attendance and lifestyle changes that are important to ensure success  She was given the opportunity to ask questions and I have answered all of them      I have addressed with the patient the level of CODE STATUS for this hospital stay and after explaining the different options currently she wishes to be a Level I  She understands and wishes to proceed  She has lost all the weight required prior to surgery  Review of Systems   Cardiovascular: Positive for leg swelling  All other systems reviewed and are negative  Historical Information   Past Medical History:   Diagnosis Date    Allergic rhinitis     Anemia     ok currently    Anxiety     Asthma     activity induced    Benign neoplasm of skin of trunk     Morbid obesity with BMI of 40 0-44 9, adult ([address] )     Polycystic ovary syndrome     Skin tag     Tinea versicolor      Past Surgical History:   Procedure Laterality Date    CERVICAL CERCLAGE       SECTION  2017    TONSILLECTOMY      TUBAL LIGATION  2017     Social History   Social History     Substance and Sexual Activity   Alcohol Use Yes    Comment: rarely     Social History     Substance and Sexual Activity   Drug Use No     Social History     Tobacco Use   Smoking Status Never Smoker   Smokeless Tobacco Never Used     Family History: Morbid obesity (mother RYGB)    Meds/Allergies   all medications and allergies reviewed  Allergies   Allergen Reactions    Valtrex [Valacyclovir] Swelling    Amoxicillin      Tingling of mouth        Objective     Current Vitals:   Blood Pressure: 118/72 (20)  Pulse: 61 (20)  Temperature: (!) 96 1 °F (35 6 °C) (20)  Temp Source: Tympanic (20)  Respirations: 16 (20)  Height: 5' 6" (167 6 cm) (20)  Weight - Scale: 118 kg (260 lb 3 2 oz) (20)        Physical Exam   Constitutional: She is oriented to person, place, and time  She appears well-developed and well-nourished  HENT:   Head: Atraumatic  Eyes: EOM are normal    Neck: Neck supple  Cardiovascular: Normal rate     Pulmonary/Chest: Effort normal and breath sounds normal  No respiratory distress  Abdominal: Soft  She exhibits no distension  There is no tenderness  Musculoskeletal: Normal range of motion  Neurological: She is alert and oriented to person, place, and time  Skin: Skin is warm and dry  Psychiatric: She has a normal mood and affect  Her behavior is normal    Vitals reviewed  Lab Results: I have personally reviewed pertinent lab results  Imaging: I have personally reviewed pertinent reports  EKG, Pathology, and Other Studies: I have personally reviewed pertinent reports  Code Status: Level 1    Assessment:  38 y o  yo morbidly obese female found to be a good candidate to undergo a weight loss operation upon being enrolled here at the [facility]  She has completed all of the preoperative process for bariatric surgery      Plan:  - Plan for laparoscopic possible open analilia-en-y gastric bypass with intraoperative EGD, possible sleeve gastrectomy, possible hiatal hernia repair   - consent signed  - preop orders placed  - (372)

## 2020-07-14 DIAGNOSIS — Z01.818 PREOP TESTING: ICD-10-CM

## 2020-07-14 PROCEDURE — U0003 INFECTIOUS AGENT DETECTION BY NUCLEIC ACID (DNA OR RNA); SEVERE ACUTE RESPIRATORY SYNDROME CORONAVIRUS 2 (SARS-COV-2) (CORONAVIRUS DISEASE [COVID-19]), AMPLIFIED PROBE TECHNIQUE, MAKING USE OF HIGH THROUGHPUT TECHNOLOGIES AS DESCRIBED BY CMS-2020-01-R: HCPCS

## 2020-07-14 NOTE — PRE-PROCEDURE INSTRUCTIONS
My Surgical Experience    The following information was developed to assist you to prepare for your operation  What do I need to do before coming to the hospital?   Arrange for a responsible person to drive you to and from the hospital    Arrange care for your children at home  Children are not allowed in the recovery areas of the hospital   Plan to wear clothing that is easy to put on and take off  If you are having shoulder surgery, wear a shirt that buttons or zippers in the front  Bathing  o Shower the evening before and the morning of your surgery with an antibacterial soap  Please refer to the Pre Op Showering Instructions for Surgery Patients Sheet   o Remove nail polish and all body piercing jewelry  o Do not shave any body part for at least 24 hours before surgery-this includes face, arms, legs and upper body  Food  o Nothing to eat or drink after midnight the night before your surgery  This includes candy and chewing gum  o Exception: If your surgery is after 12:00pm (noon), you may have clear liquids such as 7-Up®, ginger ale, apple or cranberry juice, Jell-O®, water, or clear broth until 8:00 am  o Do not drink milk or juice with pulp on the morning before surgery  o Do not drink alcohol 24 hours before surgery  Medicine  o Follow instructions you received from your surgeon about which medicines you may take on the day of surgery  o If instructed to take medicine on the morning of surgery, take pills with just a small sip of water  Call your prescribing doctor for specific infroamtion on what to do if you take insulin    What should I bring to the hospital?    Bring:  [place] or a walker, if you have them, for foot or knee surgery   A list of the daily medicines, vitamins, minerals, herbals and nutritional supplements you take   Include the dosages of medicines and the time you take them each day   Glasses, dentures or hearing aids   Minimal clothing; you will be wearing hospital sleepwear   Photo ID; required to verify your identity   If you have a Living Will or Power of , bring a copy of the documents   If you have an ostomy, bring an extra pouch and any supplies you use    Do not bring   Medicines or inhalers   Money, valuables or jewelry    What other information should I know about the day of surgery?  Notify your surgeons if you develop a cold, sore throat, cough, fever, rash or any other illness   Report to the Ambulatory Surgical/Same Day Surgery Unit   You will be instructed to stop at Registration only if you have not been pre-registered   Inform your  fi they do not stay that they will be asked by the staff to leave a phone number where they can be reached   Be available to be reached before surgery  In the event the operating room schedule changes, you may be asked to come in earlier or later than expected    *It is important to tell your doctor and others involved in your health care if you are taking or have been taking any non-prescription drugs, vitamins, minerals, herbals or other nutritional supplements  Any of these may interact with some food or medicines and cause a reaction      Pre-Surgery Instructions:   Medication Instructions    acetaminophen (TYLENOL) 325 mg tablet Instructed patient per Anesthesia Guidelines   furosemide (LASIX) 20 mg tablet Instructed patient per Anesthesia Guidelines   potassium chloride (K-DUR,KLOR-CON) 20 mEq tablet Instructed patient per Anesthesia Guidelines

## 2020-07-16 LAB
INPATIENT: NORMAL
SARS-COV-2 RNA SPEC QL NAA+PROBE: NOT DETECTED

## 2020-07-17 ENCOUNTER — TELEPHONE (OUTPATIENT)
Dept: BARIATRICS | Facility: CLINIC | Age: 35
End: 2020-07-17

## 2020-07-17 ENCOUNTER — ANESTHESIA EVENT (OUTPATIENT)
Dept: PERIOP | Facility: HOSPITAL | Age: 35
DRG: 468 | End: 2020-07-17
Payer: COMMERCIAL

## 2020-07-17 NOTE — TELEPHONE ENCOUNTER
Pre-op call was made to patient to follow up on how they are doing and to remind them to continue with all medical and dietary directions that were given at pre-op class regarding liver shrinking diet and hydration  They were encouraged to purchase all vitamins and protein shakes for post op use as well as to begin Miralax three days prior to surgery as directed in Section 6 of their manual   They were reminded of the Ensure Pre-surgery drinks protocol and to bring their completed yellow form with them to surgery as well as their CPAP-BiPAP machine if they use one  Lastly, they were informed that they would be weighed the morning of surgery, goal weight 257 8lbs, and to give the office a call if they had any further questions or concerns

## 2020-07-20 ENCOUNTER — ANESTHESIA (OUTPATIENT)
Dept: PERIOP | Facility: HOSPITAL | Age: 35
DRG: 468 | End: 2020-07-20
Payer: COMMERCIAL

## 2020-07-20 ENCOUNTER — HOSPITAL ENCOUNTER (INPATIENT)
Facility: HOSPITAL | Age: 35
LOS: 1 days | Discharge: HOME | DRG: 468 | End: 2020-07-21
Attending: SURGERY | Admitting: SURGERY
Payer: COMMERCIAL

## 2020-07-20 DIAGNOSIS — Z98.84 S/P GASTRIC BYPASS: Primary | ICD-10-CM

## 2020-07-20 DIAGNOSIS — D50.9 MICROCYTIC ANEMIA: ICD-10-CM

## 2020-07-20 DIAGNOSIS — E66.01 MORBID (SEVERE) OBESITY DUE TO EXCESS CALORIES (HCC): ICD-10-CM

## 2020-07-20 PROCEDURE — 0DJ08ZZ INSPECTION OF UPPER INTESTINAL TRACT, VIA NATURAL OR ARTIFICIAL OPENING ENDOSCOPIC: ICD-10-PCS | Performed by: SURGERY

## 2020-07-20 PROCEDURE — 43644 LAP GASTRIC BYPASS/ROUX-EN-Y: CPT | Performed by: SURGERY

## 2020-07-20 PROCEDURE — NC001 PR NO CHARGE: Performed by: SURGERY

## 2020-07-20 PROCEDURE — C9290 INJ, BUPIVACAINE LIPOSOME: HCPCS | Performed by: SURGERY

## 2020-07-20 PROCEDURE — 43281 LAP PARAESOPHAG HERN REPAIR: CPT | Performed by: PHYSICIAN ASSISTANT

## 2020-07-20 PROCEDURE — 43644 LAP GASTRIC BYPASS/ROUX-EN-Y: CPT | Performed by: PHYSICIAN ASSISTANT

## 2020-07-20 PROCEDURE — 0D154ZA BYPASS ESOPHAGUS TO JEJUNUM, PERCUTANEOUS ENDOSCOPIC APPROACH: ICD-10-PCS | Performed by: SURGERY

## 2020-07-20 PROCEDURE — 43281 LAP PARAESOPHAG HERN REPAIR: CPT | Performed by: SURGERY

## 2020-07-20 PROCEDURE — C9113 INJ PANTOPRAZOLE SODIUM, VIA: HCPCS | Performed by: SURGERY

## 2020-07-20 PROCEDURE — 0BQT4ZZ REPAIR DIAPHRAGM, PERCUTANEOUS ENDOSCOPIC APPROACH: ICD-10-PCS | Performed by: SURGERY

## 2020-07-20 RX ORDER — METOCLOPRAMIDE HYDROCHLORIDE 5 MG/ML
10 INJECTION INTRAMUSCULAR; INTRAVENOUS EVERY 6 HOURS PRN
Status: DISCONTINUED | OUTPATIENT
Start: 2020-07-20 | End: 2020-07-21 | Stop reason: HOSPADM

## 2020-07-20 RX ORDER — ACETAMINOPHEN 325 MG/1
975 TABLET ORAL ONCE
Status: COMPLETED | OUTPATIENT
Start: 2020-07-20 | End: 2020-07-20

## 2020-07-20 RX ORDER — PROPOFOL 10 MG/ML
INJECTION, EMULSION INTRAVENOUS AS NEEDED
Status: DISCONTINUED | OUTPATIENT
Start: 2020-07-20 | End: 2020-07-20 | Stop reason: SURG

## 2020-07-20 RX ORDER — SODIUM CHLORIDE, SODIUM LACTATE, POTASSIUM CHLORIDE, CALCIUM CHLORIDE 600; 310; 30; 20 MG/100ML; MG/100ML; MG/100ML; MG/100ML
100 INJECTION, SOLUTION INTRAVENOUS CONTINUOUS
Status: DISCONTINUED | OUTPATIENT
Start: 2020-07-20 | End: 2020-07-20 | Stop reason: SDUPTHER

## 2020-07-20 RX ORDER — SODIUM CHLORIDE, SODIUM LACTATE, POTASSIUM CHLORIDE, CALCIUM CHLORIDE 600; 310; 30; 20 MG/100ML; MG/100ML; MG/100ML; MG/100ML
75 INJECTION, SOLUTION INTRAVENOUS CONTINUOUS
Status: DISCONTINUED | OUTPATIENT
Start: 2020-07-20 | End: 2020-07-21 | Stop reason: HOSPADM

## 2020-07-20 RX ORDER — PANTOPRAZOLE SODIUM 40 MG/1
40 INJECTION, POWDER, FOR SOLUTION INTRAVENOUS ONCE
Status: COMPLETED | OUTPATIENT
Start: 2020-07-20 | End: 2020-07-20

## 2020-07-20 RX ORDER — HYDROMORPHONE HCL/PF 1 MG/ML
1 SYRINGE (ML) INJECTION EVERY 4 HOURS PRN
Status: DISCONTINUED | OUTPATIENT
Start: 2020-07-20 | End: 2020-07-21 | Stop reason: HOSPADM

## 2020-07-20 RX ORDER — GABAPENTIN 300 MG/1
600 CAPSULE ORAL ONCE
Status: COMPLETED | OUTPATIENT
Start: 2020-07-20 | End: 2020-07-20

## 2020-07-20 RX ORDER — ONDANSETRON 2 MG/ML
INJECTION INTRAMUSCULAR; INTRAVENOUS AS NEEDED
Status: DISCONTINUED | OUTPATIENT
Start: 2020-07-20 | End: 2020-07-20 | Stop reason: SURG

## 2020-07-20 RX ORDER — PROPOFOL 10 MG/ML
INJECTION, EMULSION INTRAVENOUS CONTINUOUS PRN
Status: DISCONTINUED | OUTPATIENT
Start: 2020-07-20 | End: 2020-07-20

## 2020-07-20 RX ORDER — ACETAMINOPHEN 325 MG/1
975 TABLET ORAL EVERY 6 HOURS SCHEDULED
Status: DISCONTINUED | OUTPATIENT
Start: 2020-07-20 | End: 2020-07-21 | Stop reason: HOSPADM

## 2020-07-20 RX ORDER — HYDROMORPHONE HCL/PF 1 MG/ML
0.5 SYRINGE (ML) INJECTION
Status: DISCONTINUED | OUTPATIENT
Start: 2020-07-20 | End: 2020-07-20 | Stop reason: HOSPADM

## 2020-07-20 RX ORDER — ONDANSETRON 2 MG/ML
4 INJECTION INTRAMUSCULAR; INTRAVENOUS EVERY 4 HOURS PRN
Status: DISCONTINUED | OUTPATIENT
Start: 2020-07-20 | End: 2020-07-21 | Stop reason: HOSPADM

## 2020-07-20 RX ORDER — LIDOCAINE HYDROCHLORIDE 10 MG/ML
INJECTION, SOLUTION EPIDURAL; INFILTRATION; INTRACAUDAL; PERINEURAL AS NEEDED
Status: DISCONTINUED | OUTPATIENT
Start: 2020-07-20 | End: 2020-07-20 | Stop reason: SURG

## 2020-07-20 RX ORDER — DEXAMETHASONE SODIUM PHOSPHATE 4 MG/ML
INJECTION, SOLUTION INTRA-ARTICULAR; INTRALESIONAL; INTRAMUSCULAR; INTRAVENOUS; SOFT TISSUE AS NEEDED
Status: DISCONTINUED | OUTPATIENT
Start: 2020-07-20 | End: 2020-07-20 | Stop reason: SURG

## 2020-07-20 RX ORDER — FENTANYL CITRATE 50 UG/ML
INJECTION, SOLUTION INTRAMUSCULAR; INTRAVENOUS AS NEEDED
Status: DISCONTINUED | OUTPATIENT
Start: 2020-07-20 | End: 2020-07-20 | Stop reason: SURG

## 2020-07-20 RX ORDER — SODIUM CHLORIDE, SODIUM LACTATE, POTASSIUM CHLORIDE, CALCIUM CHLORIDE 600; 310; 30; 20 MG/100ML; MG/100ML; MG/100ML; MG/100ML
125 INJECTION, SOLUTION INTRAVENOUS CONTINUOUS
Status: DISCONTINUED | OUTPATIENT
Start: 2020-07-20 | End: 2020-07-20

## 2020-07-20 RX ORDER — ROCURONIUM BROMIDE 10 MG/ML
INJECTION, SOLUTION INTRAVENOUS AS NEEDED
Status: DISCONTINUED | OUTPATIENT
Start: 2020-07-20 | End: 2020-07-20 | Stop reason: SURG

## 2020-07-20 RX ORDER — HEPARIN SODIUM 5000 [USP'U]/ML
5000 INJECTION, SOLUTION INTRAVENOUS; SUBCUTANEOUS
Status: COMPLETED | OUTPATIENT
Start: 2020-07-20 | End: 2020-07-20

## 2020-07-20 RX ORDER — CELECOXIB 100 MG/1
200 CAPSULE ORAL ONCE
Status: COMPLETED | OUTPATIENT
Start: 2020-07-20 | End: 2020-07-20

## 2020-07-20 RX ORDER — ONDANSETRON 2 MG/ML
4 INJECTION INTRAMUSCULAR; INTRAVENOUS ONCE AS NEEDED
Status: COMPLETED | OUTPATIENT
Start: 2020-07-20 | End: 2020-07-20

## 2020-07-20 RX ORDER — LORAZEPAM 2 MG/ML
0.5 INJECTION INTRAMUSCULAR EVERY 6 HOURS PRN
Status: DISCONTINUED | OUTPATIENT
Start: 2020-07-20 | End: 2020-07-21 | Stop reason: HOSPADM

## 2020-07-20 RX ORDER — SIMETHICONE 80 MG
80 TABLET,CHEWABLE ORAL EVERY 12 HOURS
Status: DISCONTINUED | OUTPATIENT
Start: 2020-07-20 | End: 2020-07-21 | Stop reason: HOSPADM

## 2020-07-20 RX ORDER — SODIUM CHLORIDE 9 MG/ML
INJECTION, SOLUTION INTRAVENOUS CONTINUOUS PRN
Status: DISCONTINUED | OUTPATIENT
Start: 2020-07-20 | End: 2020-07-20

## 2020-07-20 RX ORDER — MAGNESIUM HYDROXIDE 1200 MG/15ML
LIQUID ORAL AS NEEDED
Status: DISCONTINUED | OUTPATIENT
Start: 2020-07-20 | End: 2020-07-20 | Stop reason: HOSPADM

## 2020-07-20 RX ORDER — HYDROMORPHONE HCL/PF 1 MG/ML
SYRINGE (ML) INJECTION AS NEEDED
Status: DISCONTINUED | OUTPATIENT
Start: 2020-07-20 | End: 2020-07-20 | Stop reason: SURG

## 2020-07-20 RX ORDER — SCOLOPAMINE TRANSDERMAL SYSTEM 1 MG/1
1 PATCH, EXTENDED RELEASE TRANSDERMAL ONCE
Status: DISCONTINUED | OUTPATIENT
Start: 2020-07-20 | End: 2020-07-21 | Stop reason: HOSPADM

## 2020-07-20 RX ORDER — KETOROLAC TROMETHAMINE 30 MG/ML
15 INJECTION, SOLUTION INTRAMUSCULAR; INTRAVENOUS EVERY 6 HOURS SCHEDULED
Status: COMPLETED | OUTPATIENT
Start: 2020-07-20 | End: 2020-07-21

## 2020-07-20 RX ORDER — BUPIVACAINE HYDROCHLORIDE 5 MG/ML
INJECTION, SOLUTION PERINEURAL AS NEEDED
Status: DISCONTINUED | OUTPATIENT
Start: 2020-07-20 | End: 2020-07-20 | Stop reason: HOSPADM

## 2020-07-20 RX ORDER — OXYCODONE HCL 5 MG/5 ML
10 SOLUTION, ORAL ORAL EVERY 4 HOURS PRN
Status: DISCONTINUED | OUTPATIENT
Start: 2020-07-20 | End: 2020-07-21 | Stop reason: HOSPADM

## 2020-07-20 RX ORDER — LEVOFLOXACIN 5 MG/ML
750 INJECTION, SOLUTION INTRAVENOUS ONCE
Status: COMPLETED | OUTPATIENT
Start: 2020-07-20 | End: 2020-07-20

## 2020-07-20 RX ORDER — OXYCODONE HCL 5 MG/5 ML
5 SOLUTION, ORAL ORAL EVERY 4 HOURS PRN
Status: DISCONTINUED | OUTPATIENT
Start: 2020-07-20 | End: 2020-07-21 | Stop reason: HOSPADM

## 2020-07-20 RX ORDER — LIDOCAINE HYDROCHLORIDE 40 MG/ML
SOLUTION TOPICAL AS NEEDED
Status: DISCONTINUED | OUTPATIENT
Start: 2020-07-20 | End: 2020-07-20 | Stop reason: SURG

## 2020-07-20 RX ORDER — MIDAZOLAM HYDROCHLORIDE 2 MG/2ML
INJECTION, SOLUTION INTRAMUSCULAR; INTRAVENOUS AS NEEDED
Status: DISCONTINUED | OUTPATIENT
Start: 2020-07-20 | End: 2020-07-20 | Stop reason: SURG

## 2020-07-20 RX ORDER — PROMETHAZINE HYDROCHLORIDE 25 MG/ML
25 INJECTION, SOLUTION INTRAMUSCULAR; INTRAVENOUS EVERY 6 HOURS PRN
Status: DISCONTINUED | OUTPATIENT
Start: 2020-07-20 | End: 2020-07-21 | Stop reason: HOSPADM

## 2020-07-20 RX ADMIN — ROCURONIUM BROMIDE 30 MG: 10 INJECTION, SOLUTION INTRAVENOUS at 09:24

## 2020-07-20 RX ADMIN — HEPARIN SODIUM 5000 UNITS: 5000 INJECTION INTRAVENOUS; SUBCUTANEOUS at 05:58

## 2020-07-20 RX ADMIN — SIMETHICONE CHEW TAB 80 MG 80 MG: 80 TABLET ORAL at 23:25

## 2020-07-20 RX ADMIN — PHENYLEPHRINE HYDROCHLORIDE 50 MCG: 10 INJECTION INTRAVENOUS at 09:17

## 2020-07-20 RX ADMIN — LEVOFLOXACIN 750 MG: 5 INJECTION, SOLUTION INTRAVENOUS at 06:48

## 2020-07-20 RX ADMIN — ROCURONIUM BROMIDE 10 MG: 10 INJECTION, SOLUTION INTRAVENOUS at 08:50

## 2020-07-20 RX ADMIN — SODIUM CHLORIDE: 0.9 INJECTION, SOLUTION INTRAVENOUS at 07:36

## 2020-07-20 RX ADMIN — PROPOFOL 200 MG: 10 INJECTION, EMULSION INTRAVENOUS at 07:33

## 2020-07-20 RX ADMIN — METRONIDAZOLE 500 MG: 500 INJECTION, SOLUTION INTRAVENOUS at 07:42

## 2020-07-20 RX ADMIN — Medication 0.1 MCG/KG/HR: at 07:38

## 2020-07-20 RX ADMIN — HYDROMORPHONE HYDROCHLORIDE 1 MG: 1 INJECTION, SOLUTION INTRAMUSCULAR; INTRAVENOUS; SUBCUTANEOUS at 08:04

## 2020-07-20 RX ADMIN — FAMOTIDINE 20 MG: 10 INJECTION INTRAVENOUS at 22:00

## 2020-07-20 RX ADMIN — DEXAMETHASONE SODIUM PHOSPHATE 4 MG: 4 INJECTION, SOLUTION INTRA-ARTICULAR; INTRALESIONAL; INTRAMUSCULAR; INTRAVENOUS; SOFT TISSUE at 08:07

## 2020-07-20 RX ADMIN — ACETAMINOPHEN 975 MG: 325 TABLET, FILM COATED ORAL at 18:33

## 2020-07-20 RX ADMIN — METRONIDAZOLE 500 MG: 500 INJECTION, SOLUTION INTRAVENOUS at 23:26

## 2020-07-20 RX ADMIN — HYDROMORPHONE HYDROCHLORIDE 0.5 MG: 1 INJECTION, SOLUTION INTRAMUSCULAR; INTRAVENOUS; SUBCUTANEOUS at 10:41

## 2020-07-20 RX ADMIN — KETOROLAC TROMETHAMINE 15 MG: 30 INJECTION, SOLUTION INTRAMUSCULAR at 16:06

## 2020-07-20 RX ADMIN — ROCURONIUM BROMIDE 50 MG: 10 INJECTION, SOLUTION INTRAVENOUS at 07:34

## 2020-07-20 RX ADMIN — ROCURONIUM BROMIDE 20 MG: 10 INJECTION, SOLUTION INTRAVENOUS at 07:53

## 2020-07-20 RX ADMIN — LIDOCAINE HYDROCHLORIDE 1 APPLICATION: 40 SOLUTION TOPICAL at 10:13

## 2020-07-20 RX ADMIN — ACETAMINOPHEN 975 MG: 325 TABLET, FILM COATED ORAL at 23:25

## 2020-07-20 RX ADMIN — SUGAMMADEX 200 MG: 100 INJECTION, SOLUTION INTRAVENOUS at 09:57

## 2020-07-20 RX ADMIN — FENTANYL CITRATE 100 MCG: 50 INJECTION, SOLUTION INTRAMUSCULAR; INTRAVENOUS at 07:33

## 2020-07-20 RX ADMIN — SODIUM CHLORIDE, SODIUM LACTATE, POTASSIUM CHLORIDE, AND CALCIUM CHLORIDE 75 ML/HR: .6; .31; .03; .02 INJECTION, SOLUTION INTRAVENOUS at 12:34

## 2020-07-20 RX ADMIN — ONDANSETRON 4 MG: 2 INJECTION INTRAMUSCULAR; INTRAVENOUS at 08:07

## 2020-07-20 RX ADMIN — PANTOPRAZOLE SODIUM 40 MG: 40 INJECTION, POWDER, FOR SOLUTION INTRAVENOUS at 10:29

## 2020-07-20 RX ADMIN — SCOPALAMINE 1 PATCH: 1 PATCH, EXTENDED RELEASE TRANSDERMAL at 05:54

## 2020-07-20 RX ADMIN — SODIUM CHLORIDE, SODIUM LACTATE, POTASSIUM CHLORIDE, AND CALCIUM CHLORIDE 75 ML/HR: .6; .31; .03; .02 INJECTION, SOLUTION INTRAVENOUS at 22:04

## 2020-07-20 RX ADMIN — MIDAZOLAM HYDROCHLORIDE 2 MG: 1 INJECTION, SOLUTION INTRAMUSCULAR; INTRAVENOUS at 07:26

## 2020-07-20 RX ADMIN — KETOROLAC TROMETHAMINE 15 MG: 30 INJECTION, SOLUTION INTRAMUSCULAR at 23:25

## 2020-07-20 RX ADMIN — OXYCODONE HYDROCHLORIDE 5 MG: 5 SOLUTION ORAL at 20:09

## 2020-07-20 RX ADMIN — SODIUM CHLORIDE, SODIUM LACTATE, POTASSIUM CHLORIDE, AND CALCIUM CHLORIDE 125 ML/HR: .6; .31; .03; .02 INJECTION, SOLUTION INTRAVENOUS at 06:14

## 2020-07-20 RX ADMIN — PROPOFOL 50 MG: 10 INJECTION, EMULSION INTRAVENOUS at 08:04

## 2020-07-20 RX ADMIN — ACETAMINOPHEN 975 MG: 325 TABLET, FILM COATED ORAL at 05:56

## 2020-07-20 RX ADMIN — GABAPENTIN 600 MG: 300 CAPSULE ORAL at 05:56

## 2020-07-20 RX ADMIN — LIDOCAINE HYDROCHLORIDE 50 MG: 10 INJECTION, SOLUTION EPIDURAL; INFILTRATION; INTRACAUDAL; PERINEURAL at 07:33

## 2020-07-20 RX ADMIN — ONDANSETRON 4 MG: 2 INJECTION INTRAMUSCULAR; INTRAVENOUS at 11:25

## 2020-07-20 RX ADMIN — PROPOFOL 100 MCG/KG/MIN: 10 INJECTION, EMULSION INTRAVENOUS at 07:41

## 2020-07-20 RX ADMIN — CELECOXIB 200 MG: 100 CAPSULE ORAL at 05:55

## 2020-07-20 RX ADMIN — METRONIDAZOLE 500 MG: 500 INJECTION, SOLUTION INTRAVENOUS at 16:06

## 2020-07-20 RX ADMIN — SIMETHICONE CHEW TAB 80 MG 80 MG: 80 TABLET ORAL at 14:37

## 2020-07-20 NOTE — PLAN OF CARE
Problem: Potential for Falls  Goal: Patient will remain free of falls  Description  INTERVENTIONS:  - Assess patient frequently for physical needs  -  Identify cognitive and physical deficits and behaviors that affect risk of falls    -  [place] fall precautions as indicated by assessment   - Educate patient/family on patient safety including physical limitations  - Instruct patient to call for assistance with activity based on assessment  - Modify environment to reduce risk of injury  - Consider OT/PT consult to assist with strengthening/mobility  Outcome: Progressing

## 2020-07-20 NOTE — OP NOTE
OPERATIVE REPORT  PATIENT NAME: Annamarie Mckay    :  1985  MRN: [id number]  Pt Location: [facility] OR ROOM 02    SURGERY DATE: 2020    Surgeon(s) and Role:     * Reji Barrett MD - Primary     * Amalia Blackwell PA-C - Assisting     Preop Diagnosis:  Morbid obesity ([facility] ) [E66 01]  Obstructive sleep apnea [G47 33]    Post-Op Diagnosis Codes:     * Morbid obesity ([facility] ) [E66 01]     * Obstructive sleep apnea [G47 33]    Procedure(s) (LRB):  BYPASS GASTRIC  JENNIFER-EN-Y LAPAROSCOPIC (N/A)  REPAIR HERNIA PARAESOPHAGEAL  LAPAROSCOPIC (N/A)    Specimen(s):  * No specimens in log *    Estimated Blood Loss:   20 ml    Drains:  * No LDAs found *    Anesthesia Type:   General    Operative Indications: [facility] ([facility] ) [E66 01]  Obstructive sleep apnea [G47 33]  BMI 40 75 kg/m2    Operative Findings:  Negative leak test   Medium sized umbilical hernia    Complications:   None    Procedure and Technique:  The patient was identified by name, armband and conversation  The patient was then brought to the operative theatre  After successful induction of general anesthesia, the patient was prepped and draped in the usual sterile fashion  A timeout was performed and all were in agreement  Optiview technique was used to gain entrance into the abdomen with a 12 mm 0 degree laparoscope in the left upper quadrant  Three 12 mm ports were then placed in the right upper quadrant, left lower quadrant, and umbilicus  A 5 mm epigastric liver retractor was placed  Four quadrant Exparel/Marcaine transversus abdominus plane block was performed  The omentum was in a medium sized umbilical hernia defect  This was transected with the harmonic, leaving a portion of devascularized omentum in the umbilical hernia sac  The omentum was then split using ultrasonic joseph  Starting at the ligament of treitz 50 cm of small bowel was counted and divided with Medtronic stapler, white load   Another 150 cm of small bowel was counted from here and a stapled jejunojejunostomy was created with the Medtronic stapler, white load  The enterotomy was closed with a running 3-0 PDS  The mesenteric defect was then closed with a running 2-0 ethibond  A medium sized paraesophageal hernia was identified  Pars flaccida was opened  The phrenoesophageal ligament was dissected  The right fran and left fran were dissected free of the hernia sac  Protecting the esophagus and vagus nerves a complete 360 degree circumferential mediastinal dissection was performed, bringing the hernia sac into the abdomen  A cruoplasty was then performed with two interrupted posterior and one interrupted anterior 2-0 Ethibond suture  The gastroesophageal fat pad was dissected  Perigastric dissection was used to enter the lesser sac 5 cm distal to the gastroesophageal junction  Gastric pouch was then created with one horizontal firing and three vertical firings with the Medtronic stapler, purple load  The analilia limb brought up but had tension  A small bowel resection was performed with an additional white load after the mesentery was divided with the harmonic to gain additional length  The analilia limb was then brought antecolic/antegastric and a handsewn end to side gastrojejunostomy was then created with 3-0 PDS in two layers over a 34 Western Livia levacuator tube  The endoscope was then advanced past the posterior pharynx into the gastric pouch  Air leak test was negative and the anastomosis was hemostatic  Perez's space was then closed with a running 2-0 ethibond suture  The small bowel was retrieved with an endocatch bag  The umbilical port and the left lower quadrant port were then closed with a transfascial 0 vicryl suture  All port sites were examined for bleeding as we exited the abdomen to ensure hemostasis  Port sites were then closed with monocryl and dermabond  All instrument, sponge and needle counts were correct        I was present for the entire procedure, A qualified resident physician was not available and A physician assistant was required during the procedure for retraction tissue handling,dissection and suturing, stapling, traction/countertraction and performance of intraoperative EGD      Patient Disposition:  PACU     SIGNATURE: Natividad Parisi MD  DATE: July 20, 2020  TIME: 10:13 AM

## 2020-07-20 NOTE — ANESTHESIA PREPROCEDURE EVALUATION
Review of Systems/Medical History  Patient summary reviewed  Chart reviewed  No history of anesthetic complications     Cardiovascular  EKG reviewed, Exercise tolerance (METS): >4,  No dysrhythmias , No angina ,    Pulmonary  Asthma , Sleep apnea Sleep Study completed,        GI/Hepatic       Negative  ROS        Endo/Other    Obesity  morbid obesity   GYN       Hematology  Anemia ,     Musculoskeletal  Negative musculoskeletal ROS        Neurology  Negative neurology ROS      Psychology   Anxiety,              Physical Exam    Airway    Mallampati score: II  TM Distance: >3 FB  Neck ROM: full     Dental   No notable dental hx     Cardiovascular  Rhythm: regular, Rate: normal,     Pulmonary  Breath sounds clear to auscultation,     Other Findings        Anesthesia Plan  ASA Score- 3     Anesthesia Type- general with ASA Monitors  Additional Monitors:   Airway Plan: ETT  Plan Factors-    Induction- intravenous  Postoperative Plan- Plan for postoperative opioid use  Planned trial extubation    Informed Consent- Anesthetic plan and risks discussed with patient  I personally reviewed this patient with the CRNA  Discussed and agreed on the Anesthesia Plan with the CRNA  Tala Corrigan

## 2020-07-20 NOTE — ANESTHESIA POSTPROCEDURE EVALUATION
Post-Op Assessment Note    CV Status:  Stable  Pain Score: 0    Pain management: adequate     Mental Status:  Sleepy and arousable   Hydration Status:  Stable   PONV Controlled:  Controlled   Airway Patency:  Patent and adequate   Post Op Vitals Reviewed: Yes      Staff: CRNA   Comments: face mask oxygen to PACU          BP      Temp      Pulse     Resp      SpO2

## 2020-07-20 NOTE — H&P
Patient seen and examined by me  H&P updated  Original H&P on paper in chart      Annelise Bronson MD  7/20/2020  6:57 AM

## 2020-07-21 VITALS
HEART RATE: 51 BPM | OXYGEN SATURATION: 98 % | SYSTOLIC BLOOD PRESSURE: 118 MMHG | RESPIRATION RATE: 18 BRPM | TEMPERATURE: 97.9 F | HEIGHT: 66 IN | BODY MASS INDEX: 40.58 KG/M2 | WEIGHT: 252.5 LBS | DIASTOLIC BLOOD PRESSURE: 73 MMHG

## 2020-07-21 LAB
ANION GAP SERPL CALCULATED.3IONS-SCNC: 7 MMOL/L (ref 4–13)
BUN SERPL-MCNC: 8 MG/DL (ref 5–25)
CALCIUM SERPL-MCNC: 8.6 MG/DL (ref 8.3–10.1)
CHLORIDE SERPL-SCNC: 106 MMOL/L (ref 100–108)
CO2 SERPL-SCNC: 28 MMOL/L (ref 21–32)
CREAT SERPL-MCNC: 0.98 MG/DL (ref 0.6–1.3)
ERYTHROCYTE [DISTWIDTH] IN BLOOD BY AUTOMATED COUNT: 15.8 % (ref 11.6–15.1)
GFR SERPL CREATININE-BSD FRML MDRD: 74 ML/MIN/1.73SQ M
GLUCOSE SERPL-MCNC: 92 MG/DL (ref 65–140)
HCT VFR BLD AUTO: 34.5 % (ref 34.8–46.1)
HGB BLD-MCNC: 10.6 G/DL (ref 11.5–15.4)
MCH RBC QN AUTO: 24.6 PG (ref 26.8–34.3)
MCHC RBC AUTO-ENTMCNC: 30.7 G/DL (ref 31.4–37.4)
MCV RBC AUTO: 80 FL (ref 82–98)
PLATELET # BLD AUTO: 250 THOUSANDS/UL (ref 149–390)
PMV BLD AUTO: 11.9 FL (ref 8.9–12.7)
POTASSIUM SERPL-SCNC: 3.6 MMOL/L (ref 3.5–5.3)
RBC # BLD AUTO: 4.31 MILLION/UL (ref 3.81–5.12)
SODIUM SERPL-SCNC: 141 MMOL/L (ref 136–145)
WBC # BLD AUTO: 9.63 THOUSAND/UL (ref 4.31–10.16)

## 2020-07-21 PROCEDURE — 82728 ASSAY OF FERRITIN: CPT

## 2020-07-21 PROCEDURE — 83550 IRON BINDING TEST: CPT

## 2020-07-21 PROCEDURE — NC001 PR NO CHARGE: Performed by: PHYSICIAN ASSISTANT

## 2020-07-21 PROCEDURE — 83540 ASSAY OF IRON: CPT

## 2020-07-21 PROCEDURE — 80048 BASIC METABOLIC PNL TOTAL CA: CPT | Performed by: SURGERY

## 2020-07-21 PROCEDURE — 99024 POSTOP FOLLOW-UP VISIT: CPT | Performed by: SURGERY

## 2020-07-21 PROCEDURE — 85027 COMPLETE CBC AUTOMATED: CPT | Performed by: SURGERY

## 2020-07-21 RX ORDER — OMEPRAZOLE 20 MG/1
20 TABLET, DELAYED RELEASE ORAL 2 TIMES DAILY
Qty: 90 TABLET | Refills: 0 | Status: SHIPPED | OUTPATIENT
Start: 2020-07-21 | End: 2020-10-09 | Stop reason: ALTCHOICE

## 2020-07-21 RX ORDER — ACETAMINOPHEN 325 MG/1
975 TABLET ORAL EVERY 8 HOURS SCHEDULED
Qty: 27 TABLET | Refills: 0
Start: 2020-07-21 | End: 2020-11-23

## 2020-07-21 RX ORDER — LEVOFLOXACIN 5 MG/ML
750 INJECTION, SOLUTION INTRAVENOUS ONCE
Status: COMPLETED | OUTPATIENT
Start: 2020-07-21 | End: 2020-07-21

## 2020-07-21 RX ADMIN — KETOROLAC TROMETHAMINE 15 MG: 30 INJECTION, SOLUTION INTRAMUSCULAR at 11:47

## 2020-07-21 RX ADMIN — ACETAMINOPHEN 975 MG: 325 TABLET, FILM COATED ORAL at 07:15

## 2020-07-21 RX ADMIN — ACETAMINOPHEN 975 MG: 325 TABLET, FILM COATED ORAL at 11:47

## 2020-07-21 RX ADMIN — KETOROLAC TROMETHAMINE 15 MG: 30 INJECTION, SOLUTION INTRAMUSCULAR at 07:15

## 2020-07-21 RX ADMIN — OXYCODONE HYDROCHLORIDE 5 MG: 5 SOLUTION ORAL at 04:38

## 2020-07-21 RX ADMIN — SODIUM CHLORIDE, SODIUM LACTATE, POTASSIUM CHLORIDE, AND CALCIUM CHLORIDE 75 ML/HR: .6; .31; .03; .02 INJECTION, SOLUTION INTRAVENOUS at 07:16

## 2020-07-21 RX ADMIN — FAMOTIDINE 20 MG: 10 INJECTION INTRAVENOUS at 09:22

## 2020-07-21 RX ADMIN — SIMETHICONE CHEW TAB 80 MG 80 MG: 80 TABLET ORAL at 11:47

## 2020-07-21 RX ADMIN — LEVOFLOXACIN 750 MG: 5 INJECTION, SOLUTION INTRAVENOUS at 09:22

## 2020-07-21 NOTE — PROGRESS NOTES
Progress Note - Bariatric Surgery   Idania Gaffney 38 y o  female MRN: [id number]  Unit/Bed#: [address] [place] [address] Encounter: [id number]      Subjective/Objective     Subjective: Patient with morbid obesity s/p s/p lap Teodoro-en-Y Gastric Bypass and paraesophageal hernia repair POD1  Mild incisional soreness  Tolerating liquid diet without nausea or vomiting, pain adequately controlled on oral pain medication, ambulating without assistance, voiding well, using incentive spirometer  Denies fevers, chills, sweats, SOB, CP, calf pain  Periorbital swelling improved  Objective:    /69   Pulse 58   Temp 98 1 °F (36 7 °C)   Resp 16   Ht 5' 6" (1 676 m)   Wt 115 kg (252 lb 8 oz)   LMP 07/06/2020   SpO2 100%   BMI 40 75 kg/m²       Intake/Output Summary (Last 24 hours) at 7/21/2020 0813  Last data filed at 7/20/2020 1234  Gross per 24 hour   Intake 1800 ml   Output    Net 1800 ml       Invasive Devices     Peripheral Intravenous Line            Peripheral IV 07/20/20 Left Hand 1 day    Peripheral IV 07/20/20 Right Antecubital 1 day                ROS: 10-point system completed  All negative except see HPI  Physical Exam    General Appearance:    Alert, cooperative, no distress, appears stated age   Head:    Normocephalic, Periorbital swelling from yesterday mostly resolved   Lungs:     respirations unlabored   Heart:    Regular rate and rhythm   Abdomen:     Soft, appropriate tenderness, bowel sounds active all four quadrants, non distended, incisions clean, dry, and intact   Extremities:   Extremities normal, atraumatic, no cyanosis or edema                   Lab, Imaging and other studies:  I have personally reviewed pertinent lab results    , CBC:   Lab Results   Component Value Date    WBC 9 63 07/21/2020    HGB 10 6 (L) 07/21/2020    HCT 34 5 (L) 07/21/2020    MCV 80 (L) 07/21/2020     07/21/2020    MCH 24 6 (L) 07/21/2020    MCHC 30 7 (L) 07/21/2020    RDW 15 8 (H) 07/21/2020    MPV 11 9 07/21/2020 , CMP:   Lab Results   Component Value Date    SODIUM 141 07/21/2020    K 3 6 07/21/2020     07/21/2020    CO2 28 07/21/2020    BUN 8 07/21/2020    CREATININE 0 98 07/21/2020    CALCIUM 8 6 07/21/2020    EGFR 74 07/21/2020        VTE Mechanical Prophylaxis: sequential compression device    Assessment/Plan  1)  Morbid Obesity s/p lap Teodoro-en-Y Gastric Bypass and paraesophageal hernia repair POD1 with stable post op course  Encourage PO fluids, ambulation, and incentive spirometry  If patient continues to tolerate adequate PO fluids will plan for D/C this afternoon  2) Chronic venous stasis - stable; will D/C lasix and potassium and monitor closely    3) BERENICE - continue CPAP    4) LIZZIE - start oral bariatric MVI's with iron and may need iron infusions in the future given hx of constipation  Will closely monitor post op    Plan of care was discussed with patient and patient's nurse  Care plan discussed with Dr Norma Justin  Dispo: Continue bariatric clear liquid diet, ambulation, incentive spirometry         Liam Byrd PA-C  7/21/2020  8:14 AM

## 2020-07-21 NOTE — PLAN OF CARE
Problem: Potential for Falls  Goal: Patient will remain free of falls  Description  INTERVENTIONS:  - Assess patient frequently for physical needs  -  Identify cognitive and physical deficits and behaviors that affect risk of falls    -  Altus fall precautions as indicated by assessment   - Educate patient/family on patient safety including physical limitations  - Instruct patient to call for assistance with activity based on assessment  - Modify environment to reduce risk of injury  - Consider OT/PT consult to assist with strengthening/mobility  Outcome: Progressing     Problem: CARDIOVASCULAR - ADULT  Goal: Absence of cardiac dysrhythmias or at baseline rhythm  Description  INTERVENTIONS:  - Continuous cardiac monitoring, vital signs, obtain 12 lead EKG if ordered  - Administer antiarrhythmic and heart rate control medications as ordered  - Monitor electrolytes and administer replacement therapy as ordered  Outcome: Progressing     Problem: GASTROINTESTINAL - ADULT  Goal: Minimal or absence of nausea and/or vomiting  Description  INTERVENTIONS:  - Administer IV fluids if ordered to ensure adequate hydration  - Maintain NPO status until nausea and vomiting are resolved  - Nasogastric tube if ordered  - Administer ordered antiemetic medications as needed  - Provide nonpharmacologic comfort measures as appropriate  - Advance diet as tolerated, if ordered  - Consider nutrition services referral to assist patient with adequate nutrition and appropriate food choices  Outcome: Progressing  Goal: Maintains or returns to baseline bowel function  Description  INTERVENTIONS:  - Assess bowel function  - Encourage oral fluids to ensure adequate hydration  - Administer IV fluids if ordered to ensure adequate hydration  - Administer ordered medications as needed  - Encourage mobilization and activity  - Consider nutritional services referral to assist patient with adequate nutrition and appropriate food choices  Outcome: Progressing

## 2020-07-21 NOTE — NURSING NOTE
Care Completed  Pt stable for discharge  AVS reviewed with Pt at bedside  All questions answered  IV and Masimo removed  Vaccines are up to date  Pt discharged home  Pt brought to the discharge area accompanied by PCA

## 2020-07-21 NOTE — DISCHARGE INSTRUCTIONS
Bariatric/Weight Loss Surgery  Hospital Discharge Instructions  1  ACTIVITY:  a  Progress as feels comfortable - a good rule is:  if you are doing something and it begins to hurt, stop doing the activity  Walk every hour while at home  b  Jeyson Guerrier may walk stairs if you do so slowly  c  You may shower 48 hours after surgery  d  Use your incentive spirometer 10 times per hour while awake for 1 week  e  Do NOT drive for 48 hours after surgery  No driving 24 hours after taking certain prescription pain medications   Examples of such medication are Percocet, Darvocet, Oxycodone, Tylenol #3, and Tylenol with Codeine  Follow your pharmacists orders  2  DIET  a  Stay on a liquid diet for 7 days after your surgery date, sipping slowly  Refer to your manual for examples of choices  Remember to keep your liquids sugar free or low calorie  You may have protein drinks  Make sure to drink 48 to 64 ounces per day of fluids  b  Jeyson Guerrier may advance to a pureed diet one week after surgery as instructed by your diet progression pamphlet  Once you get approval from your surgeon at your first post operative visit you may advance to the soft diet  3  MEDICATIONS:  a  The abdominal nerve block will wear off during the first 1-2 days that you are home, and you may become sore  Continue to take your Tylenol and your pain medication as instructed  b  Start vitamins and minerals when you get home  c  Anti-acid Medication as per prescription  d  Other medications as indicated on the Physician Patient Discharge Instructions form given to you at the time of discharge  e  Make sure that you are splitting your pill or tablet medications in halves or fourths or even crushing them before you take them  Capsules should be opened and mixed with water or jello  You need to do this for at least 4 weeks after surgery  Eventually you will be able to take your medications the regular way as they were prescribed     f  Jeyson Guerrier will need to consult with your Family Doctor in regards to all your prescribed medication, particularly those for blood pressure and diabetes  As you lose weight, medical conditions may change, requiring an alteration or elimination of the drug dose  g  DO NOT TAKE BIRTH CONTROL(BC) MEDICATIONS, INSERT BC VAGINAL RINGS, OR PLACE IUD OR ANY OTHER BC METHODS UNTIL 31 DAYS FROM DAY OF DISCHARGE FROM HOSPITAL  THIS PLACES YOU AT HIGH RISK FOR A POTENTIALLY LIFE THREATENING BLOOD CLOT  Remember to always use barrier methods for birth control and speak to your GYN about using two forms of birth control to start 31 days after surgery  It is very important to avoid pregnancy until at least 18-24 months after surgery  4  INCISION CARE  a  You may shower and get incisions wet 2 days after surgery  No soaking tub baths or swimming for 30 days after surgery  Keep abdominal area and incisions clean  Use soap and water to create a good lather and rinse off  Do not scrub incisions  b  If you have a drain, empty the drain as the nurses instructed  5  FOLLOW-UP APPOINTMENT should be made for one week after discharge  Call surgeons office at [phone] to schedule an appointment  6  CALL YOUR DOCTOR FOR:  pain not controlled by pain medications, a temperature greater than 101 5° F, any increase or change in drainage or redness from any incision, any vomiting or inability to keep liquids down, shortness of breath, shoulder pain, or bleeding        Letter and Information for Patient's Primary Health Care Provider      Dear Jigar Arcos Provider,       Your patient had bariatric surgery on this admission to [facility]  Due to the restrictive and/or malabsorptive nature of their procedure our surgeons recommend routine lab studies  Your patients surgeon will provide orders initially and ask that they continue to follow-up with us for life even if they see you     As time moves on some patients prefer to follow-up only with their family doctor  We ask that you discuss this regular work-up with them when they make an appointment to see you  *The lab studies recommended to best identify deficiencies are: CBC, CMP, Lipid Profile, Fe, TIBC, %Sat, Vitamin D, Folate, B12, Whole Blood Thiamine, Vitamin A, PTH, Zinc and Ferritin  We recommend HgbA1C for diabetics  *These studies should be done minimally at 6 months and a year post-operatively and then yearly thereafter  *Recommended Daily Supplements: Please see the attached Vitamin Sheet    If your patient has any dietary or psycho-social concerns, they can follow-up with our Team Dietitian and Licensed Clinical   They can reach these team members by calling the [facility]  We also encourage them to follow up at our regularly scheduled support groups or join our [facility] at [phone] Patient Forum  For your convenience we have also included a list of medicines that should be avoided after weight loss surgery and a list of the vitamin and minerals they should be taking for the rest of their lives  The patients are provided this information as well  The [facility] Team would like to thank you for the opportunity to assist in the care of your patient  Feel free to contact us with any questions by calling the [facility] office at [phone]    Sincerely,         [facility] Team    Additional Information for Providers and Patients                      Vitamins After Teodoro en Y Gastric Bypass or Sleeve Gastrectomy Surgery    Due to the decreased absorption of nutrients and the decreased amount of food eaten it is difficult to obtain all the nutrients needed consuming food  We recommend a bariatric formulated vitamin for the rest of your life    If you wish to use an over the counter vitamins please understand you may not get all the recommended daily requirements  Use the following guidelines for over the counter vitamins  Multivitamins    We recommend 2 chewable multivitamins with iron (do not take gummy chewable as they do not contain thiamine)     You can continue with the chewable or take any well formulated, high potency multivitamin containing 22 nutrients including zinc and copper   If you decide to take a bariatric vitamin the number of vitamins that you need to take will vary  Refer to the chart provided at team meeting    Calcium - Calcium is absorbed in the part of the small bowel that is bypassed in gastric bypass patients  In addition, as you lose weight, you are more at risk for loss of bone density leading to osteoporosis   The best form of calcium is Calcium Citrate  This form of calcium is better absorbed after your surgery    Recommended daily dose is 1500 mg  Take 500 mg in (3) divided doses  You can only absorb about 500 mg at a time   We recommend 2000 IU of vitamin D3 per day, in addition to what is in your calcium supplement  If you were instructed to take a higher dose based on a deficiency, then continue to take the higher vitamin D dose  Iron - Iron is absorbed in the part of the small bowel that is bypassed   You will need to take extra iron in addition to what is already in the multivitamin if you are a menstruating woman (25-45 mg of additional elemental iron) or have been diagnosed with an iron deficiency   We recommend iron in the form of Ferrous Fumarate with Vitamin C    Follow the instructions on the package or bottle unless your physician has given other dosage amounts  B12 (Cyanocobalamin) may also be decreased   Additional Vitamin B12 is recommended if you are not taking a bariatric vitamin   Take 350 to 500 micrograms (mcg) per day of B12 (Cyanocobalamin) in a sublingual form (for under the tongue)      Note:  Calcium interferes with the absorption of iron, so it is recommended that you take the calcium at least 2 hours apart from iron  The tannins in tea also interfere with the absorption of iron   Note: Anti-ulcer medications interfere with the absorption of calcium iron and B12  Space your anti-ulcer medication 2 hours apart from your vitamins  * Based on the recommendations of the ASMBS and the National Osteoporosis foundation    Non-steroidal anti-inflammatory drugs or medications containing them  You should take caution or avoid these medications as they could harm your pouch or sleeve  **This is a sample list and is not all inclusive  Please read labels carefully  **      Non Steroidal anti-inflammatory drugs  Advil (ibuprofen)  Aleve (naproxen)  Anaprox (naproxen)  Ansaid (flurbiprofen)  Azolid (phenylbutazone)  Bextra (valdecoxib)  Butazolidin (phenylbutazone)  Celebrex (celecoxib)  Clinoril (sulindac)  Dolobid (diflunisal)  Excedrin IB (ibuprofen)  Feldene (piroxicam)  Ibuprin (ibuprofen)  Indocin (indomethacin)  Lodine (etodolac)  Meclomen (meclofenamate)  Midol IB (ibuprofen)  Motrin IB (ibuprofen)  Nalfon (fenoprofen)  Naprosyn (naproxen)  Nuprin (ibuprofen)  Orudis (ketoprofen)  Oruvail (ketoprofen)  Pamprin - IB (ibuprofen)  Ponstel (mefenamic acid)  Rexolate (sodium thiosalicylate)  Tandearil (oxyphenbutazone)  Tolectin (tolmetin)  Voltaren (diclofenac)      Barbiturate  Fiorinal (butalbital/aspirin/caffeine)    Salicylates  Amigesic (salsalate)  Anacin (aspirin)  Arthropan (choline salicylate)  Ascriptin (buffered aspirin)  Aspirin (aspirin)  Aspirtab (aspirin)  Bufferin (buffered aspirin)  Disalcid (salsalate)  Ecotrin (aspirin)  Uracel (sodium salicylate)    Analgesics  Equagesic (meprobamate/aspirin)  Micrainin (meprobamate/aspirin)  Percodan (oxycodone/aspirin)    OTC  Pepto-Bismol®  Dee-Mesa®  Excedrin®    For Gastric Bypass Patients  Extended Release Medications  Sustained Release Medications  Time Released Medications

## 2020-07-21 NOTE — DISCHARGE SUMMARY
Discharge Summary - Heidi Sanders 38 y o  female MRN: [id number]    Unit/Bed#: [address] [place] [address] Encounter: [id number]      Pre-Operative Diagnosis: Pre-Op Diagnosis Codes:     * Morbid obesity ([address] ) [E66 01]     * Obstructive sleep apnea [[address]47 33]    Post-Operative Diagnosis: Post-Op Diagnosis Codes:     * Morbid obesity ([address] ) [E66 01]     * Obstructive sleep apnea [G47 33]    Procedures Performed:  Procedure(s):  BYPASS GASTRIC  JENNIFER-EN-Y LAPAROSCOPIC  REPAIR HERNIA PARAESOPHAGEAL  LAPAROSCOPIC    Surgeon: Orestes Mcghee MD    See H & P for full details of admission and Operative Note for full details of operations performed  Hospital Course:  Patient was admitted for a Laparoscopic Jennifer-En-Y Gastric Bypass and was found to require a paraesophageal hernia repair  Post operatively pain was controlled with oral analgesics and the patient is ambulating/micturating without difficulty  Vital signs and lab work were stable  The patient is tolerating clear liquid diet without nausea or vomiting  The patient is cleared for D/C by the surgeon on POD1  Patient was seen and examined prior to discharge  Provisions for Follow-Up Care:  See After Visit Summary for information related to follow-up care and home orders  Disposition: Home, in stable condition  Patient should refer to "Discharge Instructions" for further information  Planned Readmission: No    Discharge Medications:  See After Visit Summary for reconciled discharge medications provided to patient and family  Post Operative instructions: Reviewed with patient and/or family  This text is generated with voice recognition software  There may be translation, syntax,  or grammatical errors  If you have any questions, please contact the dictating provider       Signature:   Rodo Bryant PA-C  Date: 7/21/2020 Time: 8:34 AM

## 2020-07-21 NOTE — UTILIZATION REVIEW
Initial Clinical Review    Elective  surgical procedure  Age/Sex: 38 y o  female  Surgery Date:7/20/20   Procedure: BYPASS GASTRIC  JENNIFER-EN-Y LAPAROSCOPIC (N/A)  REPAIR HERNIA PARAESOPHAGEAL  LAPAROSCOPIC (N/A)  Anesthesia: general  Operative Findings: Negative leak test   Medium sized umbilical hernia     POD#1 Progress Note: POD 1 s/p LRYGB, PEHR  Doing well and tolerating a diet  Pain controlled  D/C home today     Admission Orders: Date/Time/Statement: Admission Orders (From admission, onward)     Ordered        07/20/20 1147  Inpatient Admission  Once                   Orders Placed This Encounter   Procedures    Inpatient Admission     Standing Status:   Standing     Number of Occurrences:   1     Order Specific Question:   Admitting Physician     Answer:   Ronn Ho     Order Specific Question:   Level of Care     Answer:   Med Surg [16]     Order Specific Question:   Estimated length of stay     Answer:   Inpatient Only Surgery     Vital Signs: /73 (BP Location: Left arm)   Pulse (!) 51   Temp 97 9 °F (36 6 °C) (Oral)   Resp 18   Ht 5' 6" (1 676 m)   Wt 115 kg (252 lb 8 oz)   LMP 07/06/2020   SpO2 98%   BMI 40 75 kg/m²   Diet: clears liquid bariatric  Mobility: ambulate  DVT Prophylaxis: none  Medications/Pain Control: oxycodone 5mg q4h x2  Scheduled Medications:    Medications:  acetaminophen 975 mg Oral Q6H [address]   famotidine 20 mg Intravenous Q12H [address]   ketorolac 15 mg Intravenous Q6H [address]   levofloxacin 750 mg Intravenous Once   scopolamine 1 patch Transdermal Once   simethicone 80 mg Oral Q12H     Continuous IV Infusions:    lactated ringers 75 mL/hr Intravenous Continuous     PRN Meds:    HYDROmorphone 1 mg Intravenous Q4H PRN   LORazepam 0 5 mg Intravenous Q6H PRN   metoclopramide 10 mg Intravenous Q6H PRN   ondansetron 4 mg Intravenous Q4H PRN   oxyCODONE 10 mg Oral Q4H PRN   oxyCODONE 5 mg Oral Q4H PRN   promethazine 25 mg Intramuscular Q6H PRN         Network Utilization Review Department  [email or web address]  ATTENTION: Please call with any questions or concerns to [phone] and carefully listen to the prompts so that you are directed to the right person  All voicemails are confidential   Dorothy Bee all requests for admission clinical reviews, approved or denied determinations and any other requests to dedicated fax number below belonging to the campus where the patient is receiving treatment   List of dedicated fax numbers for the Facilities:  [address] DENIALS (Administrative/Medical Necessity) [phone]   [address] (Maternity/NICU/Pediatrics) [phone]   Briseida Santos [phone]   Tamar Garcia [phone]   Mykel Beckett [phone]   Venu Tavarez [phone]   [address] [place] [address] [phone]   [facility]  [phone]   [address] [facility] [address] [facility]  [address] [facility] [address] [facility] [phone]

## 2020-07-22 ENCOUNTER — TRANSITIONAL CARE MANAGEMENT (OUTPATIENT)
Dept: FAMILY MEDICINE CLINIC | Facility: CLINIC | Age: 35
End: 2020-07-22

## 2020-07-22 ENCOUNTER — TELEPHONE (OUTPATIENT)
Dept: BARIATRICS | Facility: CLINIC | Age: 35
End: 2020-07-22

## 2020-07-23 ENCOUNTER — OFFICE VISIT (OUTPATIENT)
Dept: FAMILY MEDICINE CLINIC | Facility: CLINIC | Age: 35
End: 2020-07-23
Payer: COMMERCIAL

## 2020-07-23 ENCOUNTER — TELEPHONE (OUTPATIENT)
Dept: BARIATRICS | Facility: CLINIC | Age: 35
End: 2020-07-23

## 2020-07-23 VITALS
OXYGEN SATURATION: 98 % | BODY MASS INDEX: 40.98 KG/M2 | TEMPERATURE: 99 F | HEIGHT: 66 IN | WEIGHT: 255 LBS | SYSTOLIC BLOOD PRESSURE: 122 MMHG | HEART RATE: 64 BPM | RESPIRATION RATE: 18 BRPM | DIASTOLIC BLOOD PRESSURE: 76 MMHG

## 2020-07-23 DIAGNOSIS — G44.229 CHRONIC TENSION-TYPE HEADACHE, NOT INTRACTABLE: Primary | ICD-10-CM

## 2020-07-23 DIAGNOSIS — D50.9 MICROCYTIC ANEMIA: ICD-10-CM

## 2020-07-23 DIAGNOSIS — Z98.84 STATUS POST GASTRIC BYPASS FOR OBESITY: ICD-10-CM

## 2020-07-23 DIAGNOSIS — E66.01 CLASS 3 SEVERE OBESITY WITHOUT SERIOUS COMORBIDITY WITH BODY MASS INDEX (BMI) OF 40.0 TO 44.9 IN ADULT, UNSPECIFIED OBESITY TYPE (HCC): ICD-10-CM

## 2020-07-23 LAB
FERRITIN SERPL-MCNC: 6 NG/ML (ref 8–388)
IRON SATN MFR SERPL: 10 %
IRON SERPL-MCNC: 22 UG/DL (ref 50–170)
TIBC SERPL-MCNC: 222 UG/DL (ref 250–450)

## 2020-07-23 PROCEDURE — 1111F DSCHRG MED/CURRENT MED MERGE: CPT | Performed by: FAMILY MEDICINE

## 2020-07-23 PROCEDURE — 99496 TRANSJ CARE MGMT HIGH F2F 7D: CPT | Performed by: FAMILY MEDICINE

## 2020-07-23 NOTE — PROGRESS NOTES
[address] [address]  Dia Staff, [place], 20     Duey Babinski MRN: [id number] : 1985 Age: 38 y o  Assessment/Plan     1  Status post gastric bypass for obesity  Patient doing well postoperatively, has follow-up appointment with Bariatric next Thursday  Has been adherent to liquid diet  2  Class 3 severe obesity without serious comorbidity with body mass index (BMI) of 40 0 to 44 9 in adult, unspecified obesity type ([address] )  Status post gastric bypass surgery    3  Microcytic anemia    - Iron Panel (Includes Ferritin, Iron Sat%, Iron, and TIBC); Future  -patient instructed follow up with annual gynecology exam; heavy menstrual flow likely contributing to iron deficiency anemia  Also admits to related cramping, cannot take NSAIDs as is status post gastric bypass surgery   -As patient is status post gastric bypass surgery she may not absorb iron supplement optimally, follow-up iron panel, patient likely candidate for Venofer infusions    4  Chronic tension-type headache, not intractable    - Ambulatory referral to Physical Therapy; Future  -discussed with patient that it may be eventually helpful to follow-up with physical therapy so they can work on her trigger points and neck as the headache she is describing consistent with tension type headaches      Marisol Weber acknowledged understanding of treatment plan, all questions answered  Plan discussed with attending physician Dr Daphney Lowery  Subjective      Marisol Perkins Hao is a 38 y o  female who presents for transition of care appointment  Patient was hospitalized from 2020 - 2020 (31 hours) at [place] [address]  Patient underwent BYPASS GASTRIC JENNIFER-EN-Y LAPAROSCOPIC, REPAIR HERNIA PARAESOPHAGEAL LAPAROSCOPIC with indications being morbid obesity, obstructive sleep apnea      Hospital course per discharge summary:    Patient was admitted for a Laparoscopic Jennifer-En-Y Gastric Bypass and was found to require a paraesophageal hernia repair  Post operatively pain was controlled with oral analgesics and the patient is ambulating/micturating without difficulty  Vital signs and lab work were stable  The patient is tolerating clear liquid diet without nausea or vomiting  The patient is cleared for D/C by the surgeon on POD1      Patient was seen and examined prior to discharge  Today, denies fever, chills, shortness of breath, chest pain  Admits to abdominal left-sided discomfort  Denies bloody bowel movements  Denies hematuria, dysuria  Patient adherent to Bariatric diet, starting Monday pureed foods  Now strictly liquids  F/u with Bariatric next Thursday  Patient endorses occasional lightheadedness  Endorses headaches every day, originates in occipital region and radiates anteriorly  Patient's pain currently being managed with for oxycodone q 4h, patient states she has been only using of a 5 mg tab every 4 hours  Patient's mother is present with patient and also underwent gastric bypass surgery in 2013 she offers and has been offering support to patient  Patient has history of anemia, reports that she is not up-to-date with her gynecologic exams due to lapse in insurance  Patient states that her menstrual flow is heavy, sometimes last 10-12 days, does endorse passing clots and related cramping    Patient takes    TCM Call (since 6/22/2020)     Date and time call was made  7/22/2020 11:11 AM    Hospital care reviewed  Records reviewed    Patient [facility]ial[facility] at  [facility]    Date of Admission  07/20/20    Date of discharge  07/21/20    Diagnosis  Laparoscopic Teodoro-En-Y Gastric Bypass    Disposition  Home    Were the patients medications reviewed and updated  No    Current Symptoms  Upper abdominal pain    Upper abdominal pain severity  Severe    Upper abdominal pain onset  Gradual <img src='C:FILES (X86)      TCM Call (since 6/22/2020)     Post hospital issues  None    Should patient be enrolled in anticoag monitoring? No    Scheduled for follow up?   Yes    Patients specialists  Other (comment)    Other specialists names  Surgeon for Post Op    Did you obtain your prescribed medications  Yes    Do you need help managing your prescriptions or medications  No    Is transportation to your appointment needed  No    I have advised the patient to call PCP with any new or worsening symptoms  Zayda MILLAN        The following portions of the patient's history were reviewed and updated as appropriate: allergies, current medications, past family history, past medical history, past social history, past surgical history and problem list      Past Medical History:   Diagnosis Date    Allergic rhinitis     Anemia     ok currently    Anxiety     Asthma     activity induced    Benign neoplasm of skin of trunk     Morbid obesity with BMI of 40 0-44 9, adult ([facility] )     Polycystic ovary syndrome     Skin tag     Sleep apnea     Tinea versicolor        Past Surgical History:   Procedure Laterality Date    CERVICAL CERCLAGE       SECTION  2017    PARAESOPHAGEAL HERNIA REPAIR N/A 2020    Procedure: REPAIR HERNIA PARAESOPHAGEAL  LAPAROSCOPIC;  Surgeon: Darroll Mcardle, MD;  Location: [address] [place] [address];  Service: Bariatrics    AL LAP GASTRIC BYPASS/JENNIFER-EN-Y N/A 2020    Procedure: BYPASS GASTRIC  JENNIFER-EN-Y LAPAROSCOPIC;  Surgeon: Darroll Mcardle, MD;  Location: [address] [place] [address];  Service: Bariatrics    TONSILLECTOMY      TUBAL LIGATION         Current Outpatient Medications   Medication Sig Dispense Refill    acetaminophen (TYLENOL) 325 mg tablet Take 3 tablets (975 mg total) by mouth every 8 (eight) hours Must crush or cut tabs 27 tablet 0    omeprazole (PriLOSEC OTC) 20 MG tablet Take 1 tablet (20 mg total) by mouth 2 (two) times a day 90 tablet 0    oxyCODONE (ROXICODONE) 5 mg immediate release tablet Take 1 tablet (5 mg total) by mouth every 4 (four) hours as needed for moderate painMax Daily Amount: 30 mg 10 tablet 0    acetaminophen (TYLENOL) 325 mg tablet Take 650 mg by mouth every 6 (six) hours as needed for mild pain       No current facility-administered medications for this visit  Review of Systems     As noted in HPI    Objective      /76   Pulse 64   Temp 99 °F (37 2 °C)   Resp 18   Ht 5' 6" (1 676 m)   Wt 116 kg (255 lb)   LMP 07/06/2020   SpO2 98%   BMI 41 16 kg/m²     Physical Exam   Constitutional: She is oriented to person, place, and time  She appears well-developed and well-nourished  No distress  HENT:   Head: Normocephalic and atraumatic  Mouth/Throat: Oropharynx is clear and moist    Eyes: Conjunctivae are normal    Cardiovascular: Normal rate and regular rhythm  Pulmonary/Chest: Effort normal and breath sounds normal  No respiratory distress  Abdominal: Soft  Bowel sounds are normal  There is no rebound and no guarding  Six laparoscopic incision sites without any extensive surrounding erythema, swelling, drainage   Neurological: She is alert and oriented to person, place, and time  Skin: Skin is warm and dry  Capillary refill takes less than 2 seconds  She is not diaphoretic  Psychiatric: Thought content normal            Some portions of this record may have been generated with voice recognition software  There may be translation, syntax, or grammatical errors  Occasional wrong word or "sound-a-like" substitutions may have occurred due to the inherent limitations of the voice recognition software  Read the chart carefully and recognize, using context, where substations may have occurred  If you have any questions, please contact the dictating provider for clarification or correction, as needed

## 2020-07-23 NOTE — TELEPHONE ENCOUNTER
Received call from patient, she stated her ankles, hands, and legs (half way up) are swollen  Patient stated she was advised at d/c of surgery to stop lasix  Patient did speak to [facility] yesterday about this and was advised if this got worse patient should call back  Patient stated she had a visit with her PCP today and had a 3 lb weight gain  Denies chest pain and sob  I spoke to [address] and he advised patient to get 64 ounces of fluid or more and re start lasix  I called patient back and advised her of this, advised if no improvement to please call us back  She verbalized understandings

## 2020-07-24 NOTE — UTILIZATION REVIEW
Notification of Discharge  This is a Notification of Discharge from our facility [address]  Please be advised that this patient has been discharge from our facility  Below you will find the admission and discharge date and time including the patients disposition  PRESENTATION DATE: 7/20/2020  5:23 AM  OBS ADMISSION DATE:   IP ADMISSION DATE: 7/20/20 1148   DISCHARGE DATE: 7/21/2020 12:42 PM  DISPOSITION: Home/Self Care Home/Self Care   Admission Orders listed below:  Admission Orders (From admission, onward)     Ordered        07/20/20 1147  Inpatient Admission  Once                   Please contact the UR Department if additional information is required to close this patient's authorization/case  Niurka Lane  [facility] Utilization Review Department  Main: [phone] x carefully listen to the prompts  All voicemails are confidential   [email or web address]  Send all requests for admission clinical reviews, approved or denied determinations and any other requests to dedicated fax number below belonging to the campus where the patient is receiving treatment   List of dedicated fax numbers:  [address] DENIALS (Administrative/Medical Necessity) [phone]   [address] (Maternity/NICU/Pediatrics) [phone]   Viki Juarez [phone]   [facility] [phone]   [facility] Form [phone]   Elinor Burdick [facility] [address] [place] [address] [phone]   [facility]  [phone]   [address] [facility] [address]  [address] [facility] [address] [facility] [phone]

## 2020-07-30 ENCOUNTER — TELEPHONE (OUTPATIENT)
Dept: BARIATRICS | Facility: CLINIC | Age: 35
End: 2020-07-30

## 2020-07-31 ENCOUNTER — OFFICE VISIT (OUTPATIENT)
Dept: BARIATRICS | Facility: CLINIC | Age: 35
End: 2020-07-31

## 2020-07-31 VITALS
TEMPERATURE: 97.7 F | BODY MASS INDEX: 38.76 KG/M2 | HEART RATE: 66 BPM | WEIGHT: 241.2 LBS | DIASTOLIC BLOOD PRESSURE: 70 MMHG | SYSTOLIC BLOOD PRESSURE: 102 MMHG | HEIGHT: 66 IN | RESPIRATION RATE: 14 BRPM

## 2020-07-31 DIAGNOSIS — E28.2 PCOS (POLYCYSTIC OVARIAN SYNDROME): ICD-10-CM

## 2020-07-31 DIAGNOSIS — R60.9 PERIPHERAL EDEMA: ICD-10-CM

## 2020-07-31 DIAGNOSIS — E66.01 MORBID (SEVERE) OBESITY DUE TO EXCESS CALORIES (HCC): ICD-10-CM

## 2020-07-31 DIAGNOSIS — Z48.815 ENCOUNTER FOR SURGICAL AFTERCARE FOLLOWING SURGERY ON THE DIGESTIVE SYSTEM: Primary | ICD-10-CM

## 2020-07-31 DIAGNOSIS — E66.9 OBESITY, CLASS II, BMI 35-39.9: ICD-10-CM

## 2020-07-31 DIAGNOSIS — G47.33 OSA (OBSTRUCTIVE SLEEP APNEA): ICD-10-CM

## 2020-07-31 PROCEDURE — 1111F DSCHRG MED/CURRENT MED MERGE: CPT | Performed by: SURGERY

## 2020-07-31 PROCEDURE — 3074F SYST BP LT 130 MM HG: CPT | Performed by: SURGERY

## 2020-07-31 PROCEDURE — 99024 POSTOP FOLLOW-UP VISIT: CPT | Performed by: SURGERY

## 2020-07-31 PROCEDURE — 3078F DIAST BP <80 MM HG: CPT | Performed by: SURGERY

## 2020-07-31 PROCEDURE — 3008F BODY MASS INDEX DOCD: CPT | Performed by: SURGERY

## 2020-07-31 NOTE — PROGRESS NOTES
FIRST POST-OPERATIVE VISIT - BARIATRIC SURGERY  Marisol Weber 38 y o  female MRN: [id number]  Unit/Bed#:  Encounter: [id number]      HPI:  Carrie Clemente is a 38 y o  female who presents for the 1st postoperative visit following a laparoscopic Jennifer-en-Y gastric bypass  She is doing well  Her pain is controlled and she is tolerating a diet           Historical Information   Past Medical History:   Diagnosis Date    Allergic rhinitis     Anemia     ok currently    Anxiety     Asthma     activity induced    Benign neoplasm of skin of trunk     Morbid obesity with BMI of 40 0-44 9, adult (HCC)     Polycystic ovary syndrome     Skin tag     Sleep apnea     Tinea versicolor      Past Surgical History:   Procedure Laterality Date    CERVICAL CERCLAGE       SECTION  2017    PARAESOPHAGEAL HERNIA REPAIR N/A 2020    Procedure: REPAIR HERNIA PARAESOPHAGEAL  LAPAROSCOPIC;  Surgeon: Daria Cordero MD;  Location: [address] [place] [address];  Service: Bariatrics    [place] LAP GASTRIC BYPASS/JENNIFER-EN-Y N/A 2020    Procedure: BYPASS GASTRIC  JENNIFER-EN-Y LAPAROSCOPIC;  Surgeon: Daria Cordero MD;  Location: [address] [place] [address];  Service: Bariatrics    TONSILLECTOMY      TUBAL LIGATION       Social History   Social History     Substance and Sexual Activity   Alcohol Use Yes    Comment: rarely     Social History     Substance and Sexual Activity   Drug Use No     Social History     Tobacco Use   Smoking Status Never Smoker   Smokeless Tobacco Never Used     Family History: non-contributory    Meds/Allergies   all medications and allergies reviewed  Allergies   Allergen Reactions    Valtrex [Valacyclovir] Swelling    Amoxicillin      Tingling of mouth        Objective     Current Vitals:   Blood Pressure: 102/70 (20 1028)  Pulse: 66 (20 1028)  Temperature: 97 7 °F (36 5 °C) (20 1028)  Temp Source: Tympanic (20 1028)  Respirations: 14 (20 1028)  Height: 5' 6" (167 6 cm) (20 1028)  Weight - Scale: 109 kg (241 lb 3 2 oz) (07/31/20 1028)     Invasive Devices     None                 Physical Exam   Constitutional: She appears well-developed and well-nourished  Cardiovascular: Normal rate  Pulmonary/Chest: Effort normal  No respiratory distress  Abdominal: Soft  She exhibits no distension  There is no tenderness  Wound C/D/I   Skin: Skin is warm and dry  Vitals reviewed  Assessment/Plan :    Patient is presenting for the first postoperative visit, patient hospital stay was uneventful without any complications, patient is doing well, has no complaints, is taking vitamins as instructed, currently tolerating the blenderized diet, will advance to soft diet  Patient will also be meeting with our dietician today to review vitamin and mineral supplements and also go over diet and emphasize postoperative commitment and compliance  The patient was also instructed to start exercising on a regular basis  However, I recommended no heavy lifting, or weight exercises for another 2 weeks  F/U in 4 weeks  Patient was instructed to call if develops nausea, vomiting, fever or chills

## 2020-07-31 NOTE — LETTER
2020     Aiden Noonan, [address]    Patient: Heidi Sanders   YOB: 1985   Date of Visit: 2020       Dear Dr Rema Jeong: Thank you for referring Prasad Mcnair to me for metabolic and bariatric surgery  Below are my notes for her first post-op visit  If you have questions, please do not hesitate to call me  I look forward to following your patient along with you  Sincerely,        Orestes Mcghee MD        CC: No Recipients  Orestes Mcghee MD  2020 10:57 AM  Sign at close encounter  [address] LYNDSEY Weber 38 y o  female MRN: [id number]  Unit/Bed#:  Encounter: [id number]      HPI:  Heidi Sanders is a 38 y o  female who presents for the 1st postoperative visit following a laparoscopic Jennifer-en-Y gastric bypass  She is doing well  Her pain is controlled and she is tolerating a diet           Historical Information   Past Medical History:   Diagnosis Date    Allergic rhinitis     Anemia     ok currently    Anxiety     Asthma     activity induced    Benign neoplasm of skin of trunk     Morbid obesity with BMI of 40 0-44 9, adult (HCC)     Polycystic ovary syndrome     Skin tag     Sleep apnea     Tinea versicolor      Past Surgical History:   Procedure Laterality Date    CERVICAL CERCLAGE       SECTION  2017    PARAESOPHAGEAL HERNIA REPAIR N/A 2020    Procedure: REPAIR HERNIA PARAESOPHAGEAL  LAPAROSCOPIC;  Surgeon: Orestes Mcghee MD;  Location: [address] [place] [address];  Service: Bariatrics    [place] LAP GASTRIC BYPASS/JENNIFER-EN-Y N/A 2020    Procedure: BYPASS GASTRIC  JENNIFER-EN-Y LAPAROSCOPIC;  Surgeon: Orestes Mcghee MD;  Location: [address] [place] [address];  Service: Bariatrics    TONSILLECTOMY      TUBAL LIGATION       Social History   Social History     Substance and Sexual Activity   Alcohol Use Yes    Comment: rarely     Social History     Substance and Sexual Activity   Drug Use No     Social History     Tobacco Use   Smoking Status Never Smoker   Smokeless Tobacco Never Used     Family History: non-contributory    Meds/Allergies   all medications and allergies reviewed  Allergies   Allergen Reactions    Valtrex [Valacyclovir] Swelling    Amoxicillin      Tingling of mouth        Objective     Current Vitals:   Blood Pressure: 102/70 (07/31/20 1028)  Pulse: 66 (07/31/20 1028)  Temperature: 97 7 °F (36 5 °C) (07/31/20 1028)  Temp Source: Tympanic (07/31/20 1028)  Respirations: 14 (07/31/20 1028)  Height: 5' 6" (167 6 cm) (07/31/20 1028)  Weight - Scale: 109 kg (241 lb 3 2 oz) (07/31/20 1028)     Invasive Devices     None                 Physical Exam   Constitutional: She appears well-developed and well-nourished  Cardiovascular: Normal rate  Pulmonary/Chest: Effort normal  No respiratory distress  Abdominal: Soft  She exhibits no distension  There is no tenderness  Wound C/D/I   Skin: Skin is warm and dry  Vitals reviewed  Assessment/Plan :    Patient is presenting for the first postoperative visit, patient hospital stay was uneventful without any complications, patient is doing well, has no complaints, is taking vitamins as instructed, currently tolerating the blenderized diet, will advance to soft diet  Patient will also be meeting with our dietician today to review vitamin and mineral supplements and also go over diet and emphasize postoperative commitment and compliance  The patient was also instructed to start exercising on a regular basis  However, I recommended no heavy lifting, or weight exercises for another 2 weeks  F/U in 4 weeks  Patient was instructed to call if develops nausea, vomiting, fever or chills

## 2020-08-06 ENCOUNTER — TELEPHONE (OUTPATIENT)
Dept: FAMILY MEDICINE CLINIC | Facility: CLINIC | Age: 35
End: 2020-08-06

## 2020-08-06 NOTE — TELEPHONE ENCOUNTER
Patient is calling requesting Lab orders for Iron Panel to be placed in her chart, looks like she had one done in ED on 7/21/2020  She states she was informed during her Virtual Visit on 7/23/2020 to have labs redone

## 2020-08-07 ENCOUNTER — TELEPHONE (OUTPATIENT)
Dept: FAMILY MEDICINE CLINIC | Facility: CLINIC | Age: 35
End: 2020-08-07

## 2020-08-07 NOTE — TELEPHONE ENCOUNTER
Called patient to discuss recent iron panel, patient also with history of heavy menstrual bleeding, patient states she will make an appointment with gynecologist, states she will call today  Patient status post gastric bypass, may not have optimal absorption of p o  iron supplementation  Patient to follow-up with office to discuss plan moving forward after consultation with gynecologist, as may need Venofer infusion

## 2020-08-10 ENCOUNTER — TELEPHONE (OUTPATIENT)
Dept: FAMILY MEDICINE CLINIC | Facility: CLINIC | Age: 35
End: 2020-08-10

## 2020-08-10 NOTE — TELEPHONE ENCOUNTER
Disability claim questions and Iron transfusion questions - if Dr Garcia Sic could please call patient to answer these question  Thank you!

## 2020-08-11 ENCOUNTER — TELEMEDICINE (OUTPATIENT)
Dept: FAMILY MEDICINE CLINIC | Facility: CLINIC | Age: 35
End: 2020-08-11
Payer: COMMERCIAL

## 2020-08-11 ENCOUNTER — TELEPHONE (OUTPATIENT)
Dept: FAMILY MEDICINE CLINIC | Facility: CLINIC | Age: 35
End: 2020-08-11

## 2020-08-11 VITALS
RESPIRATION RATE: 18 BRPM | OXYGEN SATURATION: 97 % | HEART RATE: 62 BPM | SYSTOLIC BLOOD PRESSURE: 112 MMHG | HEIGHT: 66 IN | TEMPERATURE: 98.5 F | WEIGHT: 238.31 LBS | BODY MASS INDEX: 38.3 KG/M2 | DIASTOLIC BLOOD PRESSURE: 82 MMHG

## 2020-08-11 DIAGNOSIS — R53.83 OTHER FATIGUE: Primary | ICD-10-CM

## 2020-08-11 DIAGNOSIS — Z13.0 SCREENING FOR IRON DEFICIENCY ANEMIA: ICD-10-CM

## 2020-08-11 PROBLEM — D50.8 IRON DEFICIENCY ANEMIA SECONDARY TO INADEQUATE DIETARY IRON INTAKE: Status: ACTIVE | Noted: 2020-08-11

## 2020-08-11 LAB — SL AMB POCT HGB: 12.4

## 2020-08-11 PROCEDURE — 1111F DSCHRG MED/CURRENT MED MERGE: CPT | Performed by: FAMILY MEDICINE

## 2020-08-11 PROCEDURE — 1036F TOBACCO NON-USER: CPT | Performed by: FAMILY MEDICINE

## 2020-08-11 PROCEDURE — 3074F SYST BP LT 130 MM HG: CPT | Performed by: FAMILY MEDICINE

## 2020-08-11 PROCEDURE — 3079F DIAST BP 80-89 MM HG: CPT | Performed by: FAMILY MEDICINE

## 2020-08-11 PROCEDURE — 36416 COLLJ CAPILLARY BLOOD SPEC: CPT | Performed by: FAMILY MEDICINE

## 2020-08-11 PROCEDURE — 85018 HEMOGLOBIN: CPT | Performed by: FAMILY MEDICINE

## 2020-08-11 PROCEDURE — 99213 OFFICE O/P EST LOW 20 MIN: CPT | Performed by: FAMILY MEDICINE

## 2020-08-11 RX ORDER — CYANOCOBALAMIN 1000 UG/ML
1000 INJECTION INTRAMUSCULAR; SUBCUTANEOUS ONCE
Status: CANCELLED | OUTPATIENT
Start: 2020-08-18

## 2020-08-12 NOTE — TELEPHONE ENCOUNTER
Patient called to say that she went for her bloodwork and you should have the result tomorrow    She also said her job is asking when she is going back to work and she would like to know what you think for a return date - she isnt feeling well and is hoping it can be after this iron stuff is figured out

## 2020-08-13 LAB
BASOPHILS # BLD AUTO: 0 X10E3/UL (ref 0–0.2)
BASOPHILS NFR BLD AUTO: 1 %
EOSINOPHIL # BLD AUTO: 0.2 X10E3/UL (ref 0–0.4)
EOSINOPHIL NFR BLD AUTO: 3 %
ERYTHROCYTE [DISTWIDTH] IN BLOOD BY AUTOMATED COUNT: 15.2 % (ref 11.7–15.4)
HCT VFR BLD AUTO: 38.2 % (ref 34–46.6)
HGB BLD-MCNC: 12.1 G/DL (ref 11.1–15.9)
IMM GRANULOCYTES # BLD: 0 X10E3/UL (ref 0–0.1)
IMM GRANULOCYTES NFR BLD: 0 %
LYMPHOCYTES # BLD AUTO: 1.3 X10E3/UL (ref 0.7–3.1)
LYMPHOCYTES NFR BLD AUTO: 19 %
MCH RBC QN AUTO: 24.2 PG (ref 26.6–33)
MCHC RBC AUTO-ENTMCNC: 31.7 G/DL (ref 31.5–35.7)
MCV RBC AUTO: 77 FL (ref 79–97)
MONOCYTES # BLD AUTO: 0.5 X10E3/UL (ref 0.1–0.9)
MONOCYTES NFR BLD AUTO: 8 %
NEUTROPHILS # BLD AUTO: 4.7 X10E3/UL (ref 1.4–7)
NEUTROPHILS NFR BLD AUTO: 69 %
PLATELET # BLD AUTO: 241 X10E3/UL (ref 150–450)
RBC # BLD AUTO: 4.99 X10E6/UL (ref 3.77–5.28)
WBC # BLD AUTO: 6.8 X10E3/UL (ref 3.4–10.8)

## 2020-08-13 NOTE — PROGRESS NOTES
Dr Cl Smart called and said she is cancelling infusions for patient  She said her office will contact the patient to let her know

## 2020-08-14 NOTE — TELEPHONE ENCOUNTER
Dr Quang Alvarez saw the patient for this problem and has arranged iron therapy and also will do the paperwork for any hyper disability in the next several weeks

## 2020-08-16 NOTE — PROGRESS NOTES
Assessment/Plan:     Diagnoses and all orders for this visit:    Other fatigue  -     CBC and differential    Screening for iron deficiency anemia  -     POCT hemoglobin fingerstick  -     CBC and differential; Future    POCT hemoglobin in office 12 4  Will order CBC for a more definitive Hg  Recent Iron panel shows significantly low Iron  Will consider Venofer for possible Iron transfusion   Follow up in 1 week        Subjective:      Patient ID: Tony Perry is a 38 y o  female  38year old female presents complaining of fatigue  Patient states that for the past several weeks she has been feeling more and more tired  She recently underwent Bariatric surgery  Was advised in the past to take Iron supplementation but has not been taking any because she was told that the iron will not absorbed well due to her surgery  Patient reports that her periods have been very heavy every month and she will be following up with her GYN doctor at the end of this month  Patient reports that she is very light headed when she gets up and walks around  A few days ago she fell because she got very lightheaded coming off a staircase  Denies any changes in bowel color  Denies any fever, chills or shortness of breath  The following portions of the patient's history were reviewed and updated as appropriate: allergies, current medications, past family history, past medical history, past social history, past surgical history and problem list     Review of Systems   Constitutional: Positive for activity change and fatigue  Negative for appetite change, chills and fever  HENT: Negative for congestion, ear discharge, postnasal drip, rhinorrhea, sinus pressure and sinus pain  Respiratory: Negative for cough, choking, shortness of breath and wheezing  Cardiovascular: Negative for chest pain, palpitations and leg swelling     Gastrointestinal: Negative for abdominal pain, anal bleeding, blood in stool, constipation, diarrhea, nausea and vomiting  Genitourinary: Negative  Musculoskeletal: Negative  Skin: Negative for rash  Neurological: Positive for dizziness, weakness and light-headedness  Negative for syncope, speech difficulty and numbness  Objective:      /82 (BP Location: Left arm, Patient Position: Sitting)   Pulse 62   Temp 98 5 °F (36 9 °C) (Tympanic)   Resp 18   Ht 5' 6" (1 676 m)   Wt 108 kg (238 lb 5 oz)   SpO2 97%   BMI 38 46 kg/m²          Physical Exam  Vitals signs and nursing note reviewed  Constitutional:       General: She is not in acute distress  Appearance: She is well-developed  She is not diaphoretic  HENT:      Head: Normocephalic and atraumatic  Nose: Nose normal  No congestion or rhinorrhea  Mouth/Throat:      Mouth: Mucous membranes are moist       Pharynx: Oropharynx is clear  No oropharyngeal exudate or posterior oropharyngeal erythema  Eyes:      Extraocular Movements: Extraocular movements intact  Pupils: Pupils are equal, round, and reactive to light  Comments: Bilateral pale conjunctivae    Neck:      Musculoskeletal: Normal range of motion and neck supple  Cardiovascular:      Rate and Rhythm: Normal rate and regular rhythm  Heart sounds: Normal heart sounds  No murmur  No friction rub  No gallop  Pulmonary:      Effort: Pulmonary effort is normal  No respiratory distress  Breath sounds: Normal breath sounds  No wheezing  Chest:      Chest wall: No tenderness  Abdominal:      General: Bowel sounds are normal  There is no distension  Palpations: Abdomen is soft  There is no mass  Tenderness: There is no abdominal tenderness  There is no guarding or rebound  Musculoskeletal: Normal range of motion  General: No tenderness or deformity  Skin:     General: Skin is warm and dry  Capillary Refill: Capillary refill takes less than 2 seconds  Coloration: Skin is not pale     Neurological:      General: No focal deficit present  Mental Status: She is oriented to person, place, and time  Mental status is at baseline  Cranial Nerves: No cranial nerve deficit  Psychiatric:         Behavior: Behavior normal          Thought Content:  Thought content normal          Judgment: Judgment normal

## 2020-08-18 ENCOUNTER — TELEPHONE (OUTPATIENT)
Dept: FAMILY MEDICINE CLINIC | Facility: CLINIC | Age: 35
End: 2020-08-18

## 2020-08-18 ENCOUNTER — HOSPITAL ENCOUNTER (OUTPATIENT)
Dept: INFUSION CENTER | Facility: HOSPITAL | Age: 35
Discharge: HOME | End: 2020-08-18
Attending: FAMILY MEDICINE

## 2020-08-18 NOTE — TELEPHONE ENCOUNTER
Dr Dian Griffiths    Patient needs to speak to you about the bw  Dr Ivania Roland said no need Iron Transfution and you said yes you need  I am confuse  Please call me I need to know what is correct    [address]

## 2020-08-18 NOTE — TELEPHONE ENCOUNTER
Please send this message to Dr Satish Collier who is handling the iron infusion   Dr Marlo Hernandez

## 2020-08-19 ENCOUNTER — CLINICAL SUPPORT (OUTPATIENT)
Dept: BARIATRICS | Facility: CLINIC | Age: 35
End: 2020-08-19

## 2020-08-19 VITALS — HEIGHT: 66 IN | WEIGHT: 235 LBS | BODY MASS INDEX: 37.77 KG/M2

## 2020-08-19 DIAGNOSIS — K91.2 POSTSURGICAL MALABSORPTION: Primary | ICD-10-CM

## 2020-08-19 PROCEDURE — RECHECK

## 2020-08-19 NOTE — PROGRESS NOTES
Weight Management Nutrition Class     Diagnosis: Obesity    Bariatric Surgeon: Dr Matty Sheth    Surgery: Gastric Bypass Laparoscopic    Class: 5 week post op     Topics discussed today include:     fluid goals post op, protein goals post op, constipation, chew food well, exercise, avoidance of alcohol, PPI use, diet progression, hypoglycemia, dumping syndrome, protein supplems, vitamin/mineral supplements, calcium supplements, additional vitamin B12 and iron supplements    Patient was able to verbalize basic diet (protein, fluid, vitamin and mineral) recommendations and possible nutrition-related complications   Yes

## 2020-08-20 ENCOUNTER — OFFICE VISIT (OUTPATIENT)
Dept: SLEEP CENTER | Facility: CLINIC | Age: 35
End: 2020-08-20
Payer: COMMERCIAL

## 2020-08-20 VITALS
SYSTOLIC BLOOD PRESSURE: 115 MMHG | BODY MASS INDEX: 36.88 KG/M2 | HEART RATE: 56 BPM | WEIGHT: 235 LBS | HEIGHT: 67 IN | DIASTOLIC BLOOD PRESSURE: 76 MMHG

## 2020-08-20 DIAGNOSIS — G25.81 RESTLESS LEG SYNDROME: ICD-10-CM

## 2020-08-20 DIAGNOSIS — E66.9 OBESITY (BMI 30-39.9): ICD-10-CM

## 2020-08-20 DIAGNOSIS — J30.2 SEASONAL ALLERGIES: ICD-10-CM

## 2020-08-20 DIAGNOSIS — G47.33 OSA (OBSTRUCTIVE SLEEP APNEA): Primary | ICD-10-CM

## 2020-08-20 PROCEDURE — 1111F DSCHRG MED/CURRENT MED MERGE: CPT | Performed by: INTERNAL MEDICINE

## 2020-08-20 PROCEDURE — 3078F DIAST BP <80 MM HG: CPT | Performed by: INTERNAL MEDICINE

## 2020-08-20 PROCEDURE — 3074F SYST BP LT 130 MM HG: CPT | Performed by: INTERNAL MEDICINE

## 2020-08-20 PROCEDURE — 3008F BODY MASS INDEX DOCD: CPT | Performed by: INTERNAL MEDICINE

## 2020-08-20 PROCEDURE — 1036F TOBACCO NON-USER: CPT | Performed by: INTERNAL MEDICINE

## 2020-08-20 PROCEDURE — 99214 OFFICE O/P EST MOD 30 MIN: CPT | Performed by: INTERNAL MEDICINE

## 2020-08-20 NOTE — PATIENT INSTRUCTIONS

## 2020-08-20 NOTE — PROGRESS NOTES
Follow-Up Note - Sleep Center   Marisol LYNDSEY Weber  38 y o  female  EAX:0/69/3104  IWR:[id number]    CC: I saw this patient for follow-up in clinic today for Sleep disordered breathing, Coexisting Sleep and Medical Problems  She has had some weight loss since bariatric surgery a month ago  A home sleep study in February of 2020 demonstrated MAHOGANY (respiratory event index of) 9 /hour  Minimum oxygen saturation was 85%  1% of the study was spent with saturations less than 90%  The snore index was 34 4%  PFSH, Problem List, Medications & Allergies were reviewed in EMR  Interval changes: none reported  She  has a past medical history of Allergic rhinitis, Anemia (2012), Anxiety, Asthma, Benign neoplasm of skin of trunk, Morbid obesity with BMI of 40 0-44 9, adult ([facility] [address] ), Polycystic ovary syndrome, Postgastrectomy malabsorption, Skin tag, Sleep apnea, and Tinea versicolor  She has a current medication list which includes the following prescription(s): acetaminophen, acetaminophen, omeprazole, and oxycodone  ROS: constitutional, psychiatric, ENT, respiratory,CVS, GI, UGS, CNS, MSK, integumentary, endocrine, hematological reviewed  Significant for approximately 30 lb weight loss since her study  She is not requiring any medication for allergies or anxiety  DATA REVIEWED: discontinued use of PAP  Since weight reduction, she is no longer snoring and feels she sleeps better without CPAP  SUBJECTIVE: Regarding use of PAP, Marisol reports:   · She was experiencing significant adverse effects: She was unable to tolerate CPAP  · She felt was not benefiting from use  Sleep Routine: She sleeps alone and reports getting 9 5 hrs sleep  ; she has no difficulty initiating or maintaining sleep   She awakens spontaneously and feels refreshed  She denied excessive drowsiness   She rated herself at Total score: 0 /24 on the [place] sleepiness scale  Habits: reports that she has never smoked   She has never used smokeless tobacco ,  reports current alcohol use ,  reports no history of drug use , Caffeine use: none , Exercise routine: regular    EXAM: /76   Pulse 56   Ht 5' 6 5" (1 689 m)   Wt 107 kg (235 lb)   BMI 37 36 kg/m²     Patient is well groomed; well appearing  Skin/Extrem: warm & dry; col & hydration normal; no edema  Psych: cooperativeand in no distress  Mental state appears normal   CNS: Alert, orientated, clear & coherent speech  H&N: EOMI; NC/AT:no facial pressure marks, no rashes  ENMT Mucus membranes appear normal Nasal airway:patent  Oral airway:  crowded  Resp:effort is normal CVS: RRR ABD:truncal obesity MSK:Gait normal     IMPRESSION: Primary Sleep/Secondary(to Medical or Psych conditions) & comorbidities   1  BERENICE (obstructive sleep apnea)  Discontinue CPAP    Resolve based on her symptoms   2  Restless leg syndrome     3  Seasonal allergies     4  Obesity (BMI 30-39  9)         PLAN:  1  I reviewed results of the Sleep studies with the patient  2  I discussed treatment options  3  She elected to discontinue CPAP and declined a repeat diagnostic study  4  No medication is needed for restless leg symptoms  5  She was encouraged to persist with efforts at weight reduction  6  Follow-up is advised as needed if symptoms escalate /     1Thank you for allowing me to participate in the care of this patient      Sincerely,    Authenticated electronically by Noman Araujo MD on 81/18/65   Board Certified Specialist

## 2020-08-20 NOTE — PROGRESS NOTES
Review of Systems      Genitourinary excessive blood loss during menses   Cardiology ankle/leg swelling   Gastrointestinal none   Neurology none   Constitutional weight change   Integumentary none   Psychiatry none   Musculoskeletal none   Pulmonary none   ENT none   Endocrine none   Hematological none

## 2020-08-26 ENCOUNTER — OFFICE VISIT (OUTPATIENT)
Dept: FAMILY MEDICINE CLINIC | Facility: CLINIC | Age: 35
End: 2020-08-26
Payer: COMMERCIAL

## 2020-08-26 VITALS
TEMPERATURE: 98.2 F | HEIGHT: 67 IN | RESPIRATION RATE: 18 BRPM | HEART RATE: 63 BPM | BODY MASS INDEX: 36.18 KG/M2 | OXYGEN SATURATION: 98 % | DIASTOLIC BLOOD PRESSURE: 70 MMHG | SYSTOLIC BLOOD PRESSURE: 104 MMHG | WEIGHT: 230.5 LBS

## 2020-08-26 DIAGNOSIS — D50.8 IRON DEFICIENCY ANEMIA SECONDARY TO INADEQUATE DIETARY IRON INTAKE: Primary | ICD-10-CM

## 2020-08-26 PROCEDURE — 3078F DIAST BP <80 MM HG: CPT | Performed by: FAMILY MEDICINE

## 2020-08-26 PROCEDURE — 99213 OFFICE O/P EST LOW 20 MIN: CPT | Performed by: FAMILY MEDICINE

## 2020-08-26 PROCEDURE — 3008F BODY MASS INDEX DOCD: CPT | Performed by: INTERNAL MEDICINE

## 2020-08-26 PROCEDURE — 3074F SYST BP LT 130 MM HG: CPT | Performed by: FAMILY MEDICINE

## 2020-08-26 PROCEDURE — 1036F TOBACCO NON-USER: CPT | Performed by: FAMILY MEDICINE

## 2020-08-26 PROCEDURE — 1111F DSCHRG MED/CURRENT MED MERGE: CPT | Performed by: FAMILY MEDICINE

## 2020-08-26 PROCEDURE — 3008F BODY MASS INDEX DOCD: CPT | Performed by: FAMILY MEDICINE

## 2020-08-26 RX ORDER — FERROUS SULFATE TAB EC 324 MG (65 MG FE EQUIVALENT) 324 (65 FE) MG
324 TABLET DELAYED RESPONSE ORAL 2 TIMES DAILY
Qty: 60 TABLET | Refills: 5 | Status: SHIPPED | OUTPATIENT
Start: 2020-08-26 | End: 2020-11-23 | Stop reason: ALTCHOICE

## 2020-08-26 NOTE — PATIENT INSTRUCTIONS
This 24-year-old patient is doing well after her gastric bypass  She has lost 20 lb since the surgery  The patient does have iron deficiency anemia but mild  The patient will start iron supplementations and get a repeat set of blood work and 2 months  The patient is complaining of some fatigue which may resolve with the resolution of her iron deficiency anemia

## 2020-08-26 NOTE — PROGRESS NOTES
Assessment/Plan:    No problem-specific Assessment & Plan notes found for this encounter  Diagnoses and all orders for this visit:    Iron deficiency anemia secondary to inadequate dietary iron intake  -     ferrous sulfate 324 (65 Fe) mg; Take 1 tablet (324 mg total) by mouth 2 (two) times a day        Subjective:      Patient ID: Tony Perry is a 38 y o  female  This is a 49-year-old female who recently had gastric bypass surgery  Patient has lost 20 lb since her surgery  The patient was diagnosed with iron deficiency anemia but a mild form  Her MCV was decreased  The patient's ferritin and total iron was are also follow  The patient does complain of symptoms of fatigue  She denies any other complaints  The following portions of the patient's history were reviewed and updated as appropriate: allergies, current medications, past family history, past medical history, past social history, past surgical history and problem list     Review of Systems   Constitutional: Positive for fatigue  Respiratory: Negative  Cardiovascular: Negative  Gastrointestinal: Negative  Neurological: Negative  Hematological: Negative  Psychiatric/Behavioral: Negative  Objective:      /70 (BP Location: Right arm, Patient Position: Sitting, Cuff Size: Large)   Pulse 63   Temp 98 2 °F (36 8 °C) (Tympanic)   Resp 18   Ht 5' 6 5" (1 689 m)   Wt 105 kg (230 lb 8 oz)   SpO2 98%   BMI 36 65 kg/m²          Physical Exam  Constitutional:       Appearance: She is obese  Musculoskeletal: Normal range of motion  Neurological:      General: No focal deficit present  Mental Status: She is alert and oriented to person, place, and time  Psychiatric:         Mood and Affect: Mood normal          Behavior: Behavior normal          Thought Content:  Thought content normal          Judgment: Judgment normal

## 2020-09-17 ENCOUNTER — TRANSCRIBE ORDERS (OUTPATIENT)
Dept: ADMINISTRATIVE | Facility: HOSPITAL | Age: 35
End: 2020-09-17

## 2020-09-24 ENCOUNTER — TRANSCRIBE ORDERS (OUTPATIENT)
Dept: ADMINISTRATIVE | Facility: HOSPITAL | Age: 35
End: 2020-09-24

## 2020-09-25 ENCOUNTER — TRANSCRIBE ORDERS (OUTPATIENT)
Dept: ADMINISTRATIVE | Facility: HOSPITAL | Age: 35
End: 2020-09-25

## 2020-09-25 DIAGNOSIS — N94.6 DYSMENORRHEA, UNSPECIFIED: Primary | ICD-10-CM

## 2020-09-28 ENCOUNTER — HOSPITAL ENCOUNTER (OUTPATIENT)
Dept: RADIOLOGY | Facility: HOSPITAL | Age: 35
Discharge: HOME | End: 2020-09-28
Attending: OBSTETRICS & GYNECOLOGY | Admitting: RADIOLOGY
Payer: COMMERCIAL

## 2020-09-28 DIAGNOSIS — N94.6 DYSMENORRHEA, UNSPECIFIED: ICD-10-CM

## 2020-09-28 PROCEDURE — 76856 US EXAM PELVIC COMPLETE: CPT

## 2020-09-28 PROCEDURE — 76830 TRANSVAGINAL US NON-OB: CPT

## 2020-09-30 ENCOUNTER — TRANSCRIBE ORDERS (OUTPATIENT)
Dept: ADMINISTRATIVE | Facility: HOSPITAL | Age: 35
End: 2020-09-30

## 2020-09-30 DIAGNOSIS — Z87.42 HISTORY OF HEAVY PERIODS: Primary | ICD-10-CM

## 2020-10-01 ENCOUNTER — TELEPHONE (OUTPATIENT)
Dept: BARIATRICS | Facility: CLINIC | Age: 35
End: 2020-10-01

## 2020-10-01 DIAGNOSIS — K91.2 POSTSURGICAL MALABSORPTION: Primary | ICD-10-CM

## 2020-10-07 PROBLEM — K91.2 POSTSURGICAL MALABSORPTION: Status: ACTIVE | Noted: 2020-10-07

## 2020-10-07 PROBLEM — Z48.815 ENCOUNTER FOR SURGICAL AFTERCARE FOLLOWING SURGERY OF DIGESTIVE SYSTEM: Status: ACTIVE | Noted: 2020-10-07

## 2020-10-08 ENCOUNTER — TELEPHONE (OUTPATIENT)
Dept: BARIATRICS | Facility: CLINIC | Age: 35
End: 2020-10-08

## 2020-10-09 ENCOUNTER — TELEPHONE (OUTPATIENT)
Dept: BARIATRICS | Facility: CLINIC | Age: 35
End: 2020-10-09

## 2020-10-09 ENCOUNTER — OFFICE VISIT (OUTPATIENT)
Dept: BARIATRICS | Facility: CLINIC | Age: 35
End: 2020-10-09

## 2020-10-09 VITALS
BODY MASS INDEX: 34.1 KG/M2 | HEART RATE: 71 BPM | RESPIRATION RATE: 16 BRPM | TEMPERATURE: 98.2 F | DIASTOLIC BLOOD PRESSURE: 66 MMHG | SYSTOLIC BLOOD PRESSURE: 100 MMHG | HEIGHT: 66 IN | WEIGHT: 212.2 LBS

## 2020-10-09 DIAGNOSIS — E28.2 PCOS (POLYCYSTIC OVARIAN SYNDROME): ICD-10-CM

## 2020-10-09 DIAGNOSIS — G47.33 OSA (OBSTRUCTIVE SLEEP APNEA): ICD-10-CM

## 2020-10-09 DIAGNOSIS — F41.9 ANXIETY: ICD-10-CM

## 2020-10-09 DIAGNOSIS — Z48.815 ENCOUNTER FOR SURGICAL AFTERCARE FOLLOWING SURGERY OF DIGESTIVE SYSTEM: Primary | ICD-10-CM

## 2020-10-09 DIAGNOSIS — K91.2 POSTSURGICAL MALABSORPTION: ICD-10-CM

## 2020-10-09 DIAGNOSIS — D50.8 IRON DEFICIENCY ANEMIA SECONDARY TO INADEQUATE DIETARY IRON INTAKE: ICD-10-CM

## 2020-10-09 LAB
IRON SATN MFR SERPL: 9 % (ref 15–55)
IRON SERPL-MCNC: 28 UG/DL (ref 27–159)
TIBC SERPL-MCNC: 315 UG/DL (ref 250–450)
UIBC SERPL-MCNC: 287 UG/DL (ref 131–425)

## 2020-10-09 PROCEDURE — 99024 POSTOP FOLLOW-UP VISIT: CPT | Performed by: PHYSICIAN ASSISTANT

## 2020-10-12 ENCOUNTER — TELEPHONE (OUTPATIENT)
Dept: SURGICAL ONCOLOGY | Facility: CLINIC | Age: 35
End: 2020-10-12

## 2020-10-12 ENCOUNTER — HOSPITAL ENCOUNTER (OUTPATIENT)
Dept: RADIOLOGY | Facility: HOSPITAL | Age: 35
Discharge: HOME | End: 2020-10-12
Attending: OBSTETRICS & GYNECOLOGY
Payer: COMMERCIAL

## 2020-10-12 DIAGNOSIS — Z87.42 HISTORY OF HEAVY PERIODS: ICD-10-CM

## 2020-10-12 PROCEDURE — 76831 ECHO EXAM UTERUS: CPT

## 2020-10-12 PROCEDURE — 58340 CATHETER FOR HYSTEROGRAPHY: CPT

## 2020-10-15 ENCOUNTER — DOCUMENTATION (OUTPATIENT)
Dept: HEMATOLOGY ONCOLOGY | Facility: CLINIC | Age: 35
End: 2020-10-15

## 2020-10-17 LAB
25(OH)D3+25(OH)D2 SERPL-MCNC: 21.4 NG/ML (ref 30–100)
ALBUMIN SERPL-MCNC: 4.2 G/DL (ref 3.8–4.8)
ALBUMIN/GLOB SERPL: 2 {RATIO} (ref 1.2–2.2)
ALP SERPL-CCNC: 60 IU/L (ref 39–117)
ALT SERPL-CCNC: 14 IU/L (ref 0–32)
AST SERPL-CCNC: 13 IU/L (ref 0–40)
BASOPHILS # BLD AUTO: 0 X10E3/UL (ref 0–0.2)
BASOPHILS NFR BLD AUTO: 1 %
BILIRUB SERPL-MCNC: 0.5 MG/DL (ref 0–1.2)
BUN SERPL-MCNC: 7 MG/DL (ref 6–20)
BUN/CREAT SERPL: 8 (ref 9–23)
CALCIUM SERPL-MCNC: 9.3 MG/DL (ref 8.7–10.2)
CHLORIDE SERPL-SCNC: 104 MMOL/L (ref 96–106)
CO2 SERPL-SCNC: 23 MMOL/L (ref 20–29)
CREAT SERPL-MCNC: 0.87 MG/DL (ref 0.57–1)
EOSINOPHIL # BLD AUTO: 0.1 X10E3/UL (ref 0–0.4)
EOSINOPHIL NFR BLD AUTO: 3 %
ERYTHROCYTE [DISTWIDTH] IN BLOOD BY AUTOMATED COUNT: 15.7 % (ref 11.7–15.4)
FERRITIN SERPL-MCNC: 7 NG/ML (ref 15–150)
FOLATE SERPL-MCNC: 10.5 NG/ML
GLOBULIN SER-MCNC: 2.1 G/DL (ref 1.5–4.5)
GLUCOSE SERPL-MCNC: 89 MG/DL (ref 65–99)
HCT VFR BLD AUTO: 36 % (ref 34–46.6)
HGB BLD-MCNC: 11.2 G/DL (ref 11.1–15.9)
IMM GRANULOCYTES # BLD: 0 X10E3/UL (ref 0–0.1)
IMM GRANULOCYTES NFR BLD: 0 %
LYMPHOCYTES # BLD AUTO: 1.1 X10E3/UL (ref 0.7–3.1)
LYMPHOCYTES NFR BLD AUTO: 24 %
MCH RBC QN AUTO: 23.6 PG (ref 26.6–33)
MCHC RBC AUTO-ENTMCNC: 31.1 G/DL (ref 31.5–35.7)
MCV RBC AUTO: 76 FL (ref 79–97)
MONOCYTES # BLD AUTO: 0.4 X10E3/UL (ref 0.1–0.9)
MONOCYTES NFR BLD AUTO: 8 %
NEUTROPHILS # BLD AUTO: 2.9 X10E3/UL (ref 1.4–7)
NEUTROPHILS NFR BLD AUTO: 64 %
PLATELET # BLD AUTO: 257 X10E3/UL (ref 150–450)
POTASSIUM SERPL-SCNC: 3.9 MMOL/L (ref 3.5–5.2)
PROT SERPL-MCNC: 6.3 G/DL (ref 6–8.5)
PTH-INTACT SERPL-MCNC: 51 PG/ML (ref 15–65)
RBC # BLD AUTO: 4.75 X10E6/UL (ref 3.77–5.28)
SL AMB EGFR AFRICAN AMERICAN: 98 ML/MIN/1.73
SL AMB EGFR NON AFRICAN AMERICAN: 85 ML/MIN/1.73
SODIUM SERPL-SCNC: 140 MMOL/L (ref 134–144)
VIT A SERPL-MCNC: 34.4 UG/DL (ref 18.9–57.3)
VIT B1 BLD-SCNC: 73.1 NMOL/L (ref 66.5–200)
VIT B12 SERPL-MCNC: 341 PG/ML (ref 232–1245)
WBC # BLD AUTO: 4.4 X10E3/UL (ref 3.4–10.8)
ZINC SERPL-MCNC: 79 UG/DL (ref 56–134)

## 2020-10-19 ENCOUNTER — TELEPHONE (OUTPATIENT)
Dept: BARIATRICS | Facility: CLINIC | Age: 35
End: 2020-10-19

## 2020-10-19 DIAGNOSIS — D50.8 IRON DEFICIENCY ANEMIA SECONDARY TO INADEQUATE DIETARY IRON INTAKE: ICD-10-CM

## 2020-10-19 DIAGNOSIS — K91.2 POSTSURGICAL MALABSORPTION: Primary | ICD-10-CM

## 2020-10-19 DIAGNOSIS — E55.9 VITAMIN D INSUFFICIENCY: ICD-10-CM

## 2020-10-19 DIAGNOSIS — E53.8 LOW VITAMIN B12 LEVEL: ICD-10-CM

## 2020-10-19 RX ORDER — ERGOCALCIFEROL 1.25 MG/1
50000 CAPSULE ORAL
Qty: 24 CAPSULE | Refills: 0 | Status: SHIPPED | OUTPATIENT
Start: 2020-10-19 | End: 2021-06-17

## 2020-10-21 ENCOUNTER — TELEPHONE (OUTPATIENT)
Dept: BARIATRICS | Facility: CLINIC | Age: 35
End: 2020-10-21

## 2020-10-28 ENCOUNTER — TELEPHONE (OUTPATIENT)
Dept: HEMATOLOGY ONCOLOGY | Facility: CLINIC | Age: 35
End: 2020-10-28

## 2020-10-29 ENCOUNTER — OFFICE VISIT (OUTPATIENT)
Dept: FAMILY MEDICINE CLINIC | Facility: CLINIC | Age: 35
End: 2020-10-29
Payer: COMMERCIAL

## 2020-10-29 ENCOUNTER — TELEPHONE (OUTPATIENT)
Dept: HEMATOLOGY ONCOLOGY | Facility: CLINIC | Age: 35
End: 2020-10-29

## 2020-10-29 VITALS
WEIGHT: 209.5 LBS | DIASTOLIC BLOOD PRESSURE: 80 MMHG | RESPIRATION RATE: 18 BRPM | BODY MASS INDEX: 33.67 KG/M2 | SYSTOLIC BLOOD PRESSURE: 120 MMHG | HEIGHT: 66 IN | HEART RATE: 56 BPM | OXYGEN SATURATION: 98 % | TEMPERATURE: 98.1 F

## 2020-10-29 DIAGNOSIS — Z23 ENCOUNTER FOR IMMUNIZATION: ICD-10-CM

## 2020-10-29 DIAGNOSIS — Z98.84 S/P GASTRIC BYPASS: Primary | ICD-10-CM

## 2020-10-29 DIAGNOSIS — E66.09 CLASS 1 OBESITY DUE TO EXCESS CALORIES WITHOUT SERIOUS COMORBIDITY WITH BODY MASS INDEX (BMI) OF 33.0 TO 33.9 IN ADULT: ICD-10-CM

## 2020-10-29 DIAGNOSIS — D50.8 IRON DEFICIENCY ANEMIA SECONDARY TO INADEQUATE DIETARY IRON INTAKE: ICD-10-CM

## 2020-10-29 DIAGNOSIS — E55.9 VITAMIN D DEFICIENCY: ICD-10-CM

## 2020-10-29 PROCEDURE — 90682 RIV4 VACC RECOMBINANT DNA IM: CPT

## 2020-10-29 PROCEDURE — 3079F DIAST BP 80-89 MM HG: CPT | Performed by: FAMILY MEDICINE

## 2020-10-29 PROCEDURE — 3074F SYST BP LT 130 MM HG: CPT | Performed by: FAMILY MEDICINE

## 2020-10-29 PROCEDURE — 90471 IMMUNIZATION ADMIN: CPT

## 2020-10-29 PROCEDURE — 3008F BODY MASS INDEX DOCD: CPT | Performed by: PHYSICIAN ASSISTANT

## 2020-10-29 PROCEDURE — 99214 OFFICE O/P EST MOD 30 MIN: CPT | Performed by: FAMILY MEDICINE

## 2020-10-29 RX ORDER — ESCITALOPRAM OXALATE 10 MG/1
10 TABLET ORAL DAILY
COMMUNITY
End: 2021-06-17

## 2020-10-29 RX ORDER — FUROSEMIDE 20 MG/1
20 TABLET ORAL AS NEEDED
COMMUNITY
End: 2021-11-19

## 2020-11-02 ENCOUNTER — TELEPHONE (OUTPATIENT)
Dept: HEMATOLOGY ONCOLOGY | Facility: CLINIC | Age: 35
End: 2020-11-02

## 2020-11-06 ENCOUNTER — TELEPHONE (OUTPATIENT)
Dept: FAMILY MEDICINE CLINIC | Facility: CLINIC | Age: 35
End: 2020-11-06

## 2020-11-09 ENCOUNTER — OFFICE VISIT (OUTPATIENT)
Dept: URGENT CARE | Facility: CLINIC | Age: 35
End: 2020-11-09
Payer: COMMERCIAL

## 2020-11-09 VITALS — TEMPERATURE: 97.1 F | OXYGEN SATURATION: 99 % | HEART RATE: 55 BPM

## 2020-11-09 DIAGNOSIS — B34.9 VIRAL SYNDROME: Primary | ICD-10-CM

## 2020-11-09 PROCEDURE — U0003 INFECTIOUS AGENT DETECTION BY NUCLEIC ACID (DNA OR RNA); SEVERE ACUTE RESPIRATORY SYNDROME CORONAVIRUS 2 (SARS-COV-2) (CORONAVIRUS DISEASE [COVID-19]), AMPLIFIED PROBE TECHNIQUE, MAKING USE OF HIGH THROUGHPUT TECHNOLOGIES AS DESCRIBED BY CMS-2020-01-R: HCPCS | Performed by: PHYSICIAN ASSISTANT

## 2020-11-09 PROCEDURE — 99213 OFFICE O/P EST LOW 20 MIN: CPT | Performed by: PHYSICIAN ASSISTANT

## 2020-11-11 LAB — SARS-COV-2 RNA SPEC QL NAA+PROBE: DETECTED

## 2020-11-18 ENCOUNTER — TELEPHONE (OUTPATIENT)
Dept: FAMILY MEDICINE CLINIC | Facility: CLINIC | Age: 35
End: 2020-11-18

## 2020-11-20 ENCOUNTER — DOCUMENTATION (OUTPATIENT)
Dept: HEMATOLOGY ONCOLOGY | Facility: MEDICAL CENTER | Age: 35
End: 2020-11-20

## 2020-11-23 ENCOUNTER — TELEMEDICINE (OUTPATIENT)
Dept: HEMATOLOGY ONCOLOGY | Facility: MEDICAL CENTER | Age: 35
End: 2020-11-23
Payer: COMMERCIAL

## 2020-11-23 DIAGNOSIS — K91.2 POSTSURGICAL MALABSORPTION: ICD-10-CM

## 2020-11-23 DIAGNOSIS — E61.1 IRON DEFICIENCY: Primary | ICD-10-CM

## 2020-11-23 PROCEDURE — 99244 OFF/OP CNSLTJ NEW/EST MOD 40: CPT | Performed by: PHYSICIAN ASSISTANT

## 2020-11-23 PROCEDURE — 1036F TOBACCO NON-USER: CPT | Performed by: PHYSICIAN ASSISTANT

## 2020-11-23 RX ORDER — SODIUM CHLORIDE 9 MG/ML
20 INJECTION, SOLUTION INTRAVENOUS ONCE
Status: CANCELLED | OUTPATIENT
Start: 2021-01-05

## 2020-11-23 RX ORDER — SODIUM CHLORIDE 9 MG/ML
20 INJECTION, SOLUTION INTRAVENOUS ONCE
Status: CANCELLED | OUTPATIENT
Start: 2020-12-04

## 2020-12-03 RX ORDER — FUROSEMIDE 20 MG/1
TABLET ORAL
Qty: 30 TABLET | Refills: 1 | OUTPATIENT
Start: 2020-12-03

## 2020-12-04 ENCOUNTER — HOSPITAL ENCOUNTER (OUTPATIENT)
Dept: INFUSION CENTER | Facility: HOSPITAL | Age: 35
Discharge: HOME | End: 2020-12-04
Attending: INTERNAL MEDICINE
Payer: COMMERCIAL

## 2020-12-04 ENCOUNTER — TELEPHONE (OUTPATIENT)
Dept: HEMATOLOGY ONCOLOGY | Facility: CLINIC | Age: 35
End: 2020-12-04

## 2020-12-04 VITALS
OXYGEN SATURATION: 98 % | DIASTOLIC BLOOD PRESSURE: 69 MMHG | SYSTOLIC BLOOD PRESSURE: 131 MMHG | TEMPERATURE: 97.5 F | RESPIRATION RATE: 16 BRPM | HEART RATE: 60 BPM

## 2020-12-04 DIAGNOSIS — E61.1 IRON DEFICIENCY: Primary | ICD-10-CM

## 2020-12-04 DIAGNOSIS — D50.8 IRON DEFICIENCY ANEMIA SECONDARY TO INADEQUATE DIETARY IRON INTAKE: Primary | ICD-10-CM

## 2020-12-04 DIAGNOSIS — K91.2 POSTSURGICAL MALABSORPTION: ICD-10-CM

## 2020-12-04 PROCEDURE — 96365 THER/PROPH/DIAG IV INF INIT: CPT

## 2020-12-04 RX ORDER — PROCHLORPERAZINE MALEATE 10 MG
10 TABLET ORAL EVERY 6 HOURS PRN
Qty: 30 TABLET | Refills: 0 | Status: SHIPPED | OUTPATIENT
Start: 2020-12-04 | End: 2021-06-17

## 2020-12-04 RX ORDER — SODIUM CHLORIDE 9 MG/ML
20 INJECTION, SOLUTION INTRAVENOUS ONCE
Status: COMPLETED | OUTPATIENT
Start: 2020-12-04 | End: 2020-12-04

## 2020-12-04 RX ORDER — SODIUM CHLORIDE 9 MG/ML
20 INJECTION, SOLUTION INTRAVENOUS ONCE
Status: CANCELLED | OUTPATIENT
Start: 2020-12-11

## 2020-12-04 RX ADMIN — SODIUM CHLORIDE 20 ML/HR: 0.9 INJECTION, SOLUTION INTRAVENOUS at 14:27

## 2020-12-04 RX ADMIN — FERUMOXYTOL 510 MG: 510 INJECTION INTRAVENOUS at 14:28

## 2020-12-07 ENCOUNTER — TELEPHONE (OUTPATIENT)
Dept: BARIATRICS | Facility: CLINIC | Age: 35
End: 2020-12-07

## 2020-12-07 DIAGNOSIS — E61.1 IRON DEFICIENCY: Primary | ICD-10-CM

## 2020-12-07 DIAGNOSIS — R51.9 NONINTRACTABLE HEADACHE, UNSPECIFIED CHRONICITY PATTERN, UNSPECIFIED HEADACHE TYPE: Primary | ICD-10-CM

## 2020-12-07 DIAGNOSIS — K91.2 POSTSURGICAL MALABSORPTION: ICD-10-CM

## 2020-12-07 RX ORDER — SODIUM CHLORIDE 9 MG/ML
20 INJECTION, SOLUTION INTRAVENOUS ONCE
Status: CANCELLED | OUTPATIENT
Start: 2020-12-18

## 2020-12-07 RX ORDER — METHYLPREDNISOLONE 4 MG/1
TABLET ORAL
Qty: 1 EACH | Refills: 0 | Status: SHIPPED | OUTPATIENT
Start: 2020-12-07 | End: 2021-06-17

## 2020-12-18 ENCOUNTER — HOSPITAL ENCOUNTER (OUTPATIENT)
Dept: INFUSION CENTER | Facility: HOSPITAL | Age: 35
Discharge: HOME | End: 2020-12-18
Attending: INTERNAL MEDICINE
Payer: COMMERCIAL

## 2020-12-18 VITALS
SYSTOLIC BLOOD PRESSURE: 121 MMHG | TEMPERATURE: 97.6 F | DIASTOLIC BLOOD PRESSURE: 59 MMHG | RESPIRATION RATE: 16 BRPM | OXYGEN SATURATION: 100 % | HEART RATE: 66 BPM

## 2020-12-18 DIAGNOSIS — K91.2 POSTSURGICAL MALABSORPTION: ICD-10-CM

## 2020-12-18 DIAGNOSIS — E61.1 IRON DEFICIENCY: Primary | ICD-10-CM

## 2020-12-18 PROCEDURE — 96367 TX/PROPH/DG ADDL SEQ IV INF: CPT

## 2020-12-18 PROCEDURE — 96365 THER/PROPH/DIAG IV INF INIT: CPT

## 2020-12-18 RX ORDER — ACETAMINOPHEN 325 MG/1
650 TABLET ORAL ONCE
Status: COMPLETED | OUTPATIENT
Start: 2020-12-18 | End: 2020-12-18

## 2020-12-18 RX ORDER — SODIUM CHLORIDE 9 MG/ML
20 INJECTION, SOLUTION INTRAVENOUS ONCE
Status: COMPLETED | OUTPATIENT
Start: 2020-12-18 | End: 2020-12-18

## 2020-12-18 RX ORDER — SODIUM CHLORIDE 9 MG/ML
20 INJECTION, SOLUTION INTRAVENOUS ONCE
Status: CANCELLED | OUTPATIENT
Start: 2020-12-18

## 2020-12-18 RX ORDER — ACETAMINOPHEN 325 MG/1
650 TABLET ORAL ONCE
Status: CANCELLED
Start: 2020-12-18

## 2020-12-18 RX ADMIN — DEXAMETHASONE SODIUM PHOSPHATE 5 MG: 10 INJECTION, SOLUTION INTRAMUSCULAR; INTRAVENOUS at 16:16

## 2020-12-18 RX ADMIN — ACETAMINOPHEN 650 MG: 325 TABLET, FILM COATED ORAL at 16:15

## 2020-12-18 RX ADMIN — SODIUM CHLORIDE 20 ML/HR: 0.9 INJECTION, SOLUTION INTRAVENOUS at 16:15

## 2020-12-18 RX ADMIN — FERUMOXYTOL 510 MG: 510 INJECTION INTRAVENOUS at 16:47

## 2021-01-08 ENCOUNTER — HOSPITAL ENCOUNTER (OUTPATIENT)
Dept: INFUSION CENTER | Facility: HOSPITAL | Age: 36
Discharge: HOME | End: 2021-01-08
Attending: INTERNAL MEDICINE

## 2021-01-09 LAB
25(OH)D3+25(OH)D2 SERPL-MCNC: 45.7 NG/ML (ref 30–100)
ALBUMIN SERPL-MCNC: 4 G/DL (ref 3.8–4.8)
ALBUMIN/GLOB SERPL: 1.7 {RATIO} (ref 1.2–2.2)
ALP SERPL-CCNC: 54 IU/L (ref 39–117)
ALT SERPL-CCNC: 25 IU/L (ref 0–32)
AST SERPL-CCNC: 17 IU/L (ref 0–40)
BASOPHILS # BLD AUTO: 0 X10E3/UL (ref 0–0.2)
BASOPHILS NFR BLD AUTO: 1 %
BILIRUB SERPL-MCNC: 0.4 MG/DL (ref 0–1.2)
BUN SERPL-MCNC: 8 MG/DL (ref 6–20)
BUN/CREAT SERPL: 11 (ref 9–23)
CALCIUM SERPL-MCNC: 9.3 MG/DL (ref 8.7–10.2)
CHLORIDE SERPL-SCNC: 107 MMOL/L (ref 96–106)
CO2 SERPL-SCNC: 22 MMOL/L (ref 20–29)
COPPER SERPL-MCNC: 228 UG/DL (ref 80–158)
CREAT SERPL-MCNC: 0.76 MG/DL (ref 0.57–1)
EOSINOPHIL # BLD AUTO: 0.1 X10E3/UL (ref 0–0.4)
EOSINOPHIL NFR BLD AUTO: 2 %
ERYTHROCYTE [DISTWIDTH] IN BLOOD BY AUTOMATED COUNT: 17.4 % (ref 11.7–15.4)
FERRITIN SERPL-MCNC: 91 NG/ML (ref 15–150)
FOLATE SERPL-MCNC: 11.2 NG/ML
GLOBULIN SER-MCNC: 2.3 G/DL (ref 1.5–4.5)
GLUCOSE SERPL-MCNC: 81 MG/DL (ref 65–99)
HCT VFR BLD AUTO: 38.1 % (ref 34–46.6)
HGB BLD-MCNC: 11.8 G/DL (ref 11.1–15.9)
IMM GRANULOCYTES # BLD: 0 X10E3/UL (ref 0–0.1)
IMM GRANULOCYTES NFR BLD: 1 %
IRON SATN MFR SERPL: 27 % (ref 15–55)
IRON SERPL-MCNC: 67 UG/DL (ref 27–159)
LYMPHOCYTES # BLD AUTO: 1.1 X10E3/UL (ref 0.7–3.1)
LYMPHOCYTES NFR BLD AUTO: 24 %
MCH RBC QN AUTO: 25.6 PG (ref 26.6–33)
MCHC RBC AUTO-ENTMCNC: 31 G/DL (ref 31.5–35.7)
MCV RBC AUTO: 83 FL (ref 79–97)
MONOCYTES # BLD AUTO: 0.3 X10E3/UL (ref 0.1–0.9)
MONOCYTES NFR BLD AUTO: 6 %
NEUTROPHILS # BLD AUTO: 2.9 X10E3/UL (ref 1.4–7)
NEUTROPHILS NFR BLD AUTO: 66 %
PLATELET # BLD AUTO: 188 X10E3/UL (ref 150–450)
POTASSIUM SERPL-SCNC: 3.9 MMOL/L (ref 3.5–5.2)
PROT SERPL-MCNC: 6.3 G/DL (ref 6–8.5)
PTH-INTACT SERPL-MCNC: 23 PG/ML (ref 15–65)
RBC # BLD AUTO: 4.61 X10E6/UL (ref 3.77–5.28)
SL AMB EGFR AFRICAN AMERICAN: 116 ML/MIN/1.73
SL AMB EGFR NON AFRICAN AMERICAN: 101 ML/MIN/1.73
SODIUM SERPL-SCNC: 140 MMOL/L (ref 134–144)
TIBC SERPL-MCNC: 246 UG/DL (ref 250–450)
UIBC SERPL-MCNC: 179 UG/DL (ref 131–425)
VIT A SERPL-MCNC: 50.9 UG/DL (ref 18.9–57.3)
VIT B1 BLD-SCNC: 94 NMOL/L (ref 66.5–200)
VIT B12 SERPL-MCNC: 217 PG/ML (ref 232–1245)
WBC # BLD AUTO: 4.4 X10E3/UL (ref 3.4–10.8)
ZINC SERPL-MCNC: 99 UG/DL (ref 44–115)

## 2021-01-12 ENCOUNTER — TELEPHONE (OUTPATIENT)
Dept: BARIATRICS | Facility: CLINIC | Age: 36
End: 2021-01-12

## 2021-01-12 DIAGNOSIS — E53.8 VITAMIN B12 DEFICIENCY: ICD-10-CM

## 2021-01-12 DIAGNOSIS — K91.2 POSTSURGICAL MALABSORPTION: Primary | ICD-10-CM

## 2021-01-12 DIAGNOSIS — E53.8 LOW VITAMIN B12 LEVEL: ICD-10-CM

## 2021-01-12 NOTE — TELEPHONE ENCOUNTER
Thank you, it looks like Hematology was adding B12 to her iron infusions in the past, but she clearly will benefit from IM B12 injections  If we have them in our office she can come in to [address], but I know we are backordered there   Thanks

## 2021-01-12 NOTE — TELEPHONE ENCOUNTER
Please advise patient of labs from 01/05/21:     -Copper elevated - likely d/t OCP, no need to repeat    -Vitamin D improved to 45; continue with 3,000IU daily in [place] MVI and take additional 1,000IU daily with food    -Iron and ferritin levels greatly improved and normalized d/t iron infusions with hematology    -B12 levels trending down and now low (217) - advised she start additional 1-2,000IU daily of sublingual B12 in addition to Bariatric MVI, but ideally she should call Hematology and resume B12 injections with them asap    -Will repeat B12 in about 3 months, rest of panel to be done in July   We will review in the office again as well

## 2021-01-12 NOTE — TELEPHONE ENCOUNTER
I called and spoke to patient, advised of labs  Pt stated she has never got the b12 injections before  Just a FYI    Thank you

## 2021-01-13 DIAGNOSIS — E61.1 IRON DEFICIENCY: ICD-10-CM

## 2021-01-13 DIAGNOSIS — K91.2 POSTSURGICAL MALABSORPTION: Primary | ICD-10-CM

## 2021-01-13 RX ORDER — SODIUM CHLORIDE 9 MG/ML
20 INJECTION, SOLUTION INTRAVENOUS ONCE
Status: CANCELLED | OUTPATIENT
Start: 2021-01-15

## 2021-01-15 ENCOUNTER — HOSPITAL ENCOUNTER (OUTPATIENT)
Dept: INFUSION CENTER | Facility: HOSPITAL | Age: 36
Discharge: HOME | End: 2021-01-15
Attending: INTERNAL MEDICINE
Payer: COMMERCIAL

## 2021-01-15 VITALS
TEMPERATURE: 97.2 F | OXYGEN SATURATION: 99 % | RESPIRATION RATE: 16 BRPM | DIASTOLIC BLOOD PRESSURE: 79 MMHG | HEART RATE: 67 BPM | SYSTOLIC BLOOD PRESSURE: 116 MMHG

## 2021-01-15 DIAGNOSIS — K91.2 POSTSURGICAL MALABSORPTION: ICD-10-CM

## 2021-01-15 DIAGNOSIS — E61.1 IRON DEFICIENCY: Primary | ICD-10-CM

## 2021-01-15 PROCEDURE — 96365 THER/PROPH/DIAG IV INF INIT: CPT

## 2021-01-15 RX ORDER — SODIUM CHLORIDE 9 MG/ML
20 INJECTION, SOLUTION INTRAVENOUS ONCE
Status: COMPLETED | OUTPATIENT
Start: 2021-01-15 | End: 2021-01-15

## 2021-01-15 RX ORDER — SODIUM CHLORIDE 9 MG/ML
20 INJECTION, SOLUTION INTRAVENOUS ONCE
Status: CANCELLED | OUTPATIENT
Start: 2021-02-12

## 2021-01-15 RX ADMIN — FERUMOXYTOL 510 MG: 510 INJECTION INTRAVENOUS at 14:56

## 2021-01-15 RX ADMIN — SODIUM CHLORIDE 20 ML/HR: 0.9 INJECTION, SOLUTION INTRAVENOUS at 14:55

## 2021-01-18 ENCOUNTER — TELEPHONE (OUTPATIENT)
Dept: BARIATRICS | Facility: CLINIC | Age: 36
End: 2021-01-18

## 2021-01-18 NOTE — ASSESSMENT & PLAN NOTE
-No longer wearing CPAP, seen by sleep medicine in August (CPAP stopped d/t her not wanting to wear it anymore, had issues tolerating it)

## 2021-01-18 NOTE — ASSESSMENT & PLAN NOTE
-Most recent iron/ferritin greatly improved and normalized s/p iron infusions  -Continue monitoring and management with Hematology and Beny MVI with 45mg iron

## 2021-01-18 NOTE — ASSESSMENT & PLAN NOTE
-At risk for malabsorption of vitamins/minerals secondary to malabsorption and restriction of intake from bariatric surgery  -NOT Currently taking adequate postop bariatric surgery vitamin supplementation: Procare MVI w/ 45mg iron, calcium citrate 500mg TID, 1,000IUmcg B12  -First set of bariatric labs 01/05/21:     -Copper elevated - likely d/t OCP, no need to repeat    -Vitamin D improved to 45; continue with 3,000IU daily in [place] MVI and take additional 1,000IU daily with food    -Iron and ferritin levels greatly improved and normalized d/t iron infusions with hematology    -B12 levels trending down and now low (217) - advised she start additional 1-2,000IU daily of sublingual B12 in addition to Bariatric MVI, but ideally she should call Hematology and resume B12 injections with them d/t fatigue (we have no B12 available in our office d/t backorder issue)    -Repeat B12 in about 3 months, rest of panel again in July  -Patient received education about the importance of adhering to a lifelong supplementation regimen to avoid vitamin/mineral deficiencies

## 2021-01-18 NOTE — ASSESSMENT & PLAN NOTE
-s/p Teodoro-En-Y Gastric Bypass and repair of paraesophageal hernia repair with Dr Jatinder Fuentes on 07/20/20  Overall doing Well with her weight loss  EWL is 76%, which places the patient on schedule for expected post surgical weight loss at this time  She is not getting enough fiber, colorful fruit/vegetables, inadequate fluids, and not exercising  She will f/u with RD for additional support asap  Initial: 272 4lbs  Current: 183 2lbs  EWL: 76%  Ronald: current  Current BMI is Body mass index is 29 13 kg/m²  · Tolerating a regular diet-yes  · Eating at least 60 grams of protein per day-yes, but advised against fast food - to review increasing colorful veggies/fruit with RD  · Following 30/60 minute rule with liquids-yes  · Drinking at least 64 ounces of fluid per day-no; advised she increase to goal  · Drinking carbonated beverages-no  · Sufficient exercise-no d/t fatigue  Recommend starting walking program as tolerated  · Using NSAIDs regularly-no  · Using nicotine-no  · Using alcohol-no   Advised about the risks of alcohol s/p bariatric surgery and recommend avoiding all alcohol

## 2021-01-18 NOTE — ASSESSMENT & PLAN NOTE
-Stable  -No longer on medications  -Encouraged consistent f/u with therapist/counselor and SW services offered here

## 2021-01-19 ENCOUNTER — OFFICE VISIT (OUTPATIENT)
Dept: BARIATRICS | Facility: CLINIC | Age: 36
End: 2021-01-19
Payer: COMMERCIAL

## 2021-01-19 ENCOUNTER — TELEPHONE (OUTPATIENT)
Dept: HEMATOLOGY ONCOLOGY | Facility: MEDICAL CENTER | Age: 36
End: 2021-01-19

## 2021-01-19 VITALS
HEIGHT: 67 IN | SYSTOLIC BLOOD PRESSURE: 110 MMHG | BODY MASS INDEX: 28.75 KG/M2 | DIASTOLIC BLOOD PRESSURE: 74 MMHG | HEART RATE: 64 BPM | TEMPERATURE: 97.1 F | WEIGHT: 183.2 LBS

## 2021-01-19 DIAGNOSIS — G47.33 OSA (OBSTRUCTIVE SLEEP APNEA): ICD-10-CM

## 2021-01-19 DIAGNOSIS — F41.9 ANXIETY: ICD-10-CM

## 2021-01-19 DIAGNOSIS — K91.2 POSTSURGICAL MALABSORPTION: ICD-10-CM

## 2021-01-19 DIAGNOSIS — Z48.815 ENCOUNTER FOR SURGICAL AFTERCARE FOLLOWING SURGERY OF DIGESTIVE SYSTEM: Primary | ICD-10-CM

## 2021-01-19 DIAGNOSIS — D50.8 IRON DEFICIENCY ANEMIA SECONDARY TO INADEQUATE DIETARY IRON INTAKE: ICD-10-CM

## 2021-01-19 DIAGNOSIS — E28.2 PCOS (POLYCYSTIC OVARIAN SYNDROME): ICD-10-CM

## 2021-01-19 PROCEDURE — 3008F BODY MASS INDEX DOCD: CPT | Performed by: PHYSICIAN ASSISTANT

## 2021-01-19 PROCEDURE — 99214 OFFICE O/P EST MOD 30 MIN: CPT | Performed by: PHYSICIAN ASSISTANT

## 2021-01-19 PROCEDURE — 1036F TOBACCO NON-USER: CPT | Performed by: PHYSICIAN ASSISTANT

## 2021-01-19 NOTE — PROGRESS NOTES
Assessment/Plan:    Encounter for surgical aftercare following surgery of digestive system  -s/p Teodoro-En-Y Gastric Bypass and repair of paraesophageal hernia repair with Dr Patrick Chau on 07/20/20  Overall doing Well with her weight loss  EWL is 76%, which places the patient on schedule for expected post surgical weight loss at this time  She is not getting enough fiber, colorful fruit/vegetables, inadequate fluids, and not exercising  She will f/u with RD for additional support asap  Initial: 272 4lbs  Current: 183 2lbs  EWL: 76%  Ronald: current  Current BMI is Body mass index is 29 13 kg/m²  · Tolerating a regular diet-yes  · Eating at least 60 grams of protein per day-yes, but advised against fast food - to review increasing colorful veggies/fruit with RD  · Following 30/60 minute rule with liquids-yes  · Drinking at least 64 ounces of fluid per day-no; advised she increase to goal  · Drinking carbonated beverages-no  · Sufficient exercise-no d/t fatigue  Recommend starting walking program as tolerated  · Using NSAIDs regularly-no  · Using nicotine-no  · Using alcohol-no   Advised about the risks of alcohol s/p bariatric surgery and recommend avoiding all alcohol    Postsurgical malabsorption  -At risk for malabsorption of vitamins/minerals secondary to malabsorption and restriction of intake from bariatric surgery  -NOT Currently taking adequate postop bariatric surgery vitamin supplementation: Procare MVI w/ 45mg iron, calcium citrate 500mg TID, 1,000IUmcg B12  -First set of bariatric labs 01/05/21:     -Copper elevated - likely d/t OCP, no need to repeat    -Vitamin D improved to 45; continue with 3,000IU daily in [place] MVI and take additional 1,000IU daily with food    -Iron and ferritin levels greatly improved and normalized d/t iron infusions with hematology    -B12 levels trending down and now low (217) - advised she start additional 1-2,000IU daily of sublingual B12 in addition to Bariatric MVI, but ideally she should call Hematology and resume B12 injections with them d/t fatigue (we have no B12 available in our office d/t backorder issue)    -Repeat B12 in about 3 months, rest of panel again in July  -Patient received education about the importance of adhering to a lifelong supplementation regimen to avoid vitamin/mineral deficiencies     PCOS (polycystic ovarian syndrome)  -Long hx of very painful and heavy cycles   -s/p tubal ligation  -Placed on OCP by GYN    BERENICE (obstructive sleep apnea)  -No longer wearing CPAP, seen by sleep medicine in August (CPAP stopped d/t her not wanting to wear it anymore, had issues tolerating it)      Iron deficiency anemia secondary to inadequate dietary iron intake  -Most recent iron/ferritin greatly improved and normalized s/p iron infusions  -Continue monitoring and management with Hematology and Beny MVI with 45mg iron    Anxiety  -Stable  -No longer on medications  -Encouraged consistent f/u with therapist/counselor and SW services offered here       Diagnoses and all orders for this visit:    Encounter for surgical aftercare following surgery of digestive system    Postsurgical malabsorption    PCOS (polycystic ovarian syndrome)    BERENICE (obstructive sleep apnea)    Iron deficiency anemia secondary to inadequate dietary iron intake    Anxiety          Subjective:      Patient ID: Johann Blankenship is a 38 y o  female  -s/p Teodoro-En-Y Gastric Bypass and repair of paraesophageal hernia repair with Dr Yarely Vance on 07/20/20  Presents to the office today for routine follow up  Tolerating diet without issues; denies N/V, dysphagia, reflux  She is still feeling tired, despite having iron infusions and getting started on OCP with GYN, which has made her periods much shorter  She is not exercising  She admits to eating [employer] chicken nuggets for lunch often and does not like vegetables and drinks some diet soda, limited water      Diet Recall:   B (8am) - 2 HB egg whites and 1 cheese stick  L (12-1) - 2 HB egg whites and 1 cheese stick or 5-6 chicken nuggets  D (6-7) - baked chicken breast or beef, sometimes small amount of rice or pasta  Snacks on cheese and tomato     Fluids - 48oz water, 1 cup decaf tea    The following portions of the patient's history were reviewed and updated as appropriate: allergies, current medications, past family history, past medical history, past social history, past surgical history and problem list     Review of Systems   Constitutional: Positive for fatigue  Negative for chills and fever  Unexpected weight change: planned weight loss  HENT: Negative for trouble swallowing  Respiratory: Negative for cough and shortness of breath  Cardiovascular: Negative for chest pain and palpitations  Gastrointestinal: Negative for abdominal pain, constipation, diarrhea, nausea and vomiting  Genitourinary: Positive for menstrual problem (heavy periods improving)  Musculoskeletal: Positive for back pain  Skin: Positive for rash (under abdominal skin folds and in umbilicus)  Neurological: Negative for dizziness  Psychiatric/Behavioral:        Denies anxiety and depression         Objective:      /74 (BP Location: Left arm, Patient Position: Sitting, Cuff Size: Standard)   Pulse 64   Temp (!) 97 1 °F (36 2 °C)   Ht 5' 6 5" (1 689 m)   Wt 83 1 kg (183 lb 3 2 oz)   BMI 29 13 kg/m²     Colonoscopy-Not applicable       Physical Exam  Vitals signs reviewed  Constitutional:       General: She is not in acute distress  Appearance: She is well-developed  HENT:      Head: Normocephalic and atraumatic  Eyes:      General: No scleral icterus  Cardiovascular:      Rate and Rhythm: Normal rate and regular rhythm  Pulmonary:      Effort: Pulmonary effort is normal  No respiratory distress  Abdominal:      General: There is no distension  Palpations: Abdomen is soft  Tenderness: There is no abdominal tenderness        Comments: No incisional hernias appreciated, all incisions well healed   Skin:     General: Skin is warm and dry  Neurological:      Mental Status: She is alert and oriented to person, place, and time  Psychiatric:         Mood and Affect: Mood normal          Behavior: Behavior normal            BARRIERS: none identified    GOALS:   · Continued/Maintain healthy weight loss with good nutrition intakes  · Adequate hydration with at least 64oz  fluid intake  · Normal vitamin and mineral levels  · Exercise as tolerated  · Increase water intake - try vitamin water  · Increase variety and color in your diet  · Follow up with Reece  · Continue with extra 1-2,000IU sublingual B12  · Start exercising    · Follow-up in 6 months  We kindly ask that your arrive 15 minutes before your scheduled appointment time with your provider to allow our staff to room you, get your vital signs and update your chart  · Follow diet as discussed  · Get lab work done in 3 months to repeat B12  You have been given a lab slip today  Please call the office if you need a replacement  It is recommended to check with your insurance BEFORE getting labs done to make sure they are covered by your policy  Also, please check with your PCP and other providers before getting labs to avoid duplicate labs  Make sure to HOLD any multivitamins that may contain biotin and any biotin supplements FOR 5 DAYS before any labs since it can affect the results  · Follow vitamin and mineral recommendations as reviewed with you  · Call our office if you have any problems with abdominal pain especially associated with fever, chills, nausea, vomiting or any other concerns  · All  Post-bariatric surgery patients should be aware that very small quantities of any alcohol can cause impairment and it is very possible not to feel the effect  The effect can be in the system for several hours  It is also a stomach irritant       · It is advised to AVOID alcohol, Nonsteroidal antiinflammatory drugs (NSAIDS) and nicotine of all forms   Any of these can cause stomach irritation/pain

## 2021-01-19 NOTE — TELEPHONE ENCOUNTER
LVM for patient that I needed to reschedule her appointment for 2/22/21 at 8:30am with Felicitas Garcia PA-C due to the provider rounding  Appointment was rescheduled for the same day, 2/22/21, at 1:00pm      Provided office number for a return call if this new appointment time does not work for her

## 2021-01-19 NOTE — PATIENT INSTRUCTIONS
GOALS:   · Continued/Maintain healthy weight loss with good nutrition intakes  · Adequate hydration with at least 64oz  fluid intake  · Normal vitamin and mineral levels  · Exercise as tolerated  · Increase water intake - try vitamin water  · Increase variety and color in your diet  · Follow up with Reece  · Continue with extra 1-2,000IU sublingual B12  · Start exercising    · Follow-up in 6 months  We kindly ask that your arrive 15 minutes before your scheduled appointment time with your provider to allow our staff to room you, get your vital signs and update your chart  · Follow diet as discussed  · Get lab work done in 3 months to repeat B12  You have been given a lab slip today  Please call the office if you need a replacement  It is recommended to check with your insurance BEFORE getting labs done to make sure they are covered by your policy  Also, please check with your PCP and other providers before getting labs to avoid duplicate labs  Make sure to HOLD any multivitamins that may contain biotin and any biotin supplements FOR 5 DAYS before any labs since it can affect the results  · Follow vitamin and mineral recommendations as reviewed with you  · Call our office if you have any problems with abdominal pain especially associated with fever, chills, nausea, vomiting or any other concerns  · All  Post-bariatric surgery patients should be aware that very small quantities of any alcohol can cause impairment and it is very possible not to feel the effect  The effect can be in the system for several hours  It is also a stomach irritant  · It is advised to AVOID alcohol, Nonsteroidal antiinflammatory drugs (NSAIDS) and nicotine of all forms   Any of these can cause stomach irritation/pain

## 2021-02-10 ENCOUNTER — OFFICE VISIT (OUTPATIENT)
Dept: FAMILY MEDICINE CLINIC | Facility: CLINIC | Age: 36
End: 2021-02-10
Payer: COMMERCIAL

## 2021-02-10 VITALS
SYSTOLIC BLOOD PRESSURE: 112 MMHG | WEIGHT: 181.7 LBS | HEART RATE: 53 BPM | RESPIRATION RATE: 18 BRPM | DIASTOLIC BLOOD PRESSURE: 74 MMHG | HEIGHT: 67 IN | OXYGEN SATURATION: 98 % | TEMPERATURE: 98.4 F | BODY MASS INDEX: 28.52 KG/M2

## 2021-02-10 DIAGNOSIS — E66.3 OVERWEIGHT (BMI 25.0-29.9): ICD-10-CM

## 2021-02-10 DIAGNOSIS — Z98.84 S/P GASTRIC BYPASS: Primary | ICD-10-CM

## 2021-02-10 PROCEDURE — 99213 OFFICE O/P EST LOW 20 MIN: CPT | Performed by: FAMILY MEDICINE

## 2021-02-11 NOTE — PROGRESS NOTES
Assessment/Plan:    No problem-specific Assessment & Plan notes found for this encounter  Diagnoses and all orders for this visit:    S/P gastric bypass    Overweight (BMI 25 0-29  9)        Subjective:      Patient ID: Rupert Lui is a 38 y o  female  This is a f/u appt for this 37 yo female who had a gastric bypass  She continue to lose weight and has lost 78 lbs since her surgery  She had recent BW done througt her bypass surgeon's office  He results were reviewed with her  She is still feeling tired at times but less than the past   Her Hg and iron stores are normal   The pt is trying to exercise more often  She feels more energy to do more activities  She doesn't have a certain goal set weight to lose  The following portions of the patient's history were reviewed and updated as appropriate: allergies, current medications, past family history, past medical history, past social history, past surgical history and problem list     Review of Systems   Constitutional: Negative  HENT: Negative  Eyes: Negative  Respiratory: Negative  Mild wheezes   Cardiovascular: Negative  Gastrointestinal: Negative  Endocrine: Negative  Genitourinary: Negative  Musculoskeletal: Negative  Skin: Negative  Allergic/Immunologic: Negative  Neurological: Negative  Hematological: Negative  Psychiatric/Behavioral: Negative  Objective:      /74 (BP Location: Left arm, Patient Position: Sitting, Cuff Size: Standard)   Pulse (!) 53   Temp 98 4 °F (36 9 °C) (Tympanic)   Resp 18   Ht 5' 6 5" (1 689 m)   Wt 82 4 kg (181 lb 11 2 oz)   LMP 01/15/2021 (Approximate)   SpO2 98%   BMI 28 89 kg/m²          Physical Exam  Constitutional:       Comments: Pt is overweight with BMI of 28 89   Cardiovascular:      Rate and Rhythm: Normal rate and regular rhythm  Pulses: Normal pulses  Heart sounds: Normal heart sounds     Pulmonary:      Effort: Pulmonary effort is normal       Breath sounds: Normal breath sounds  Musculoskeletal: Normal range of motion  Neurological:      General: No focal deficit present  Mental Status: She is alert and oriented to person, place, and time  Mental status is at baseline  Psychiatric:         Mood and Affect: Mood normal          Behavior: Behavior normal          Thought Content:  Thought content normal          Judgment: Judgment normal

## 2021-02-11 NOTE — PATIENT INSTRUCTIONS
Pt will continue her present diet and exercise  She will f/u in 3 months for weight check  Repeat BW ordered by weight lose center

## 2021-02-12 ENCOUNTER — HOSPITAL ENCOUNTER (OUTPATIENT)
Dept: INFUSION CENTER | Facility: HOSPITAL | Age: 36
Discharge: HOME | End: 2021-02-12
Attending: INTERNAL MEDICINE
Payer: COMMERCIAL

## 2021-02-12 ENCOUNTER — TELEPHONE (OUTPATIENT)
Dept: ADMINISTRATIVE | Facility: OTHER | Age: 36
End: 2021-02-12

## 2021-02-12 VITALS
TEMPERATURE: 96.7 F | DIASTOLIC BLOOD PRESSURE: 70 MMHG | OXYGEN SATURATION: 100 % | RESPIRATION RATE: 16 BRPM | HEART RATE: 56 BPM | SYSTOLIC BLOOD PRESSURE: 110 MMHG

## 2021-02-12 DIAGNOSIS — E61.1 IRON DEFICIENCY: Primary | ICD-10-CM

## 2021-02-12 DIAGNOSIS — K91.2 POSTSURGICAL MALABSORPTION: ICD-10-CM

## 2021-02-12 PROCEDURE — 96365 THER/PROPH/DIAG IV INF INIT: CPT

## 2021-02-12 RX ORDER — SODIUM CHLORIDE 9 MG/ML
20 INJECTION, SOLUTION INTRAVENOUS ONCE
Status: CANCELLED | OUTPATIENT
Start: 2021-03-12

## 2021-02-12 RX ORDER — SODIUM CHLORIDE 9 MG/ML
20 INJECTION, SOLUTION INTRAVENOUS ONCE
Status: COMPLETED | OUTPATIENT
Start: 2021-02-12 | End: 2021-02-12

## 2021-02-12 RX ADMIN — FERUMOXYTOL 510 MG: 510 INJECTION INTRAVENOUS at 14:50

## 2021-02-12 RX ADMIN — SODIUM CHLORIDE 20 ML/HR: 9 INJECTION, SOLUTION INTRAVENOUS at 14:49

## 2021-02-12 NOTE — LETTER
Procedure Request Form: Cervical Cancer Screening/ 2nd Request      Date Requested: 21  Patient: Beatriz Arnett  Patient : 1985   Referring Provider: Rigoberto العراقي, DO        Date of Procedure ______________________________       The above patient has informed us that they have completed their   most recent Cervical Cancer Screening at your facility  Please complete   this form and attach all corresponding procedure reports/results  Comments __________________________________________________________  ____________________________________________________________________  ____________________________________________________________________  ____________________________________________________________________    Facility Completing Procedure _________________________________________    Form Completed By (print name) _______________________________________      Signature __________________________________________________________      These reports are needed for  compliance    Please fax this completed form and a copy of the procedure report to our office located at [place] [address] as soon as possible to [phone] attention Aaron Jeffrey: Phone [phone]    We thank you for your assistance in treating our mutual patient

## 2021-02-13 NOTE — TELEPHONE ENCOUNTER
Upon review of the In Basket request and the patient's chart, initial outreach has been made via telephone call, please see Contacts section for details        Call Monday [phone]    Thank you  Magno Hamilton MA

## 2021-02-13 NOTE — TELEPHONE ENCOUNTER
----- Message from Myles Nassar MA sent at 2/11/2021  9:05 AM EST -----  Regarding: Pap (cervical cancer screening)  02/11/21 9:06 AM    Aliyah, our patient Bharat Fortune has had Pap Smear (HPV) aka Cervical Cancer Screening completed/performed  Please assist in updating the patient chart by making an External outreach to Dr Andrey fung located in [address] [place] [address]  Office phone number is [phone]  The date of service is 11/2020-12/2020      Thank you,  MICHELLE Dan PG

## 2021-02-16 ENCOUNTER — TELEPHONE (OUTPATIENT)
Dept: ADMINISTRATIVE | Facility: OTHER | Age: 36
End: 2021-02-16

## 2021-02-16 NOTE — TELEPHONE ENCOUNTER
----- Message from Sudheer Ocampo MA sent at 2/11/2021  9:05 AM EST -----  Regarding: Pap (cervical cancer screening)  02/11/21 9:06 AM    Aliyah, our patient Fabián Hope has had Pap Smear (HPV) aka Cervical Cancer Screening completed/performed  Please assist in updating the patient chart by making an External outreach to Dr Samina Reyes facility located in [place], [place]  Office phone number is [phone]  The date of service is 11/2020-12/2020      Thank you,  MICHELLE Valverde PG

## 2021-02-16 NOTE — LETTER
Procedure Request Form: Cervical Cancer Screening      Date Requested: 21  Patient: Martina Bledsoe  Patient : 1985   Referring Provider: Mitzi Myers, DO        Date of Procedure ______________________________       The above patient has informed us that they have completed their   most recent Cervical Cancer Screening at your facility  Please complete   this form and attach all corresponding procedure reports/results  Comments __________________________________________________________  ____________________________________________________________________  ____________________________________________________________________  ____________________________________________________________________    Facility Completing Procedure _________________________________________    Form Completed By (print name) _______________________________________      Signature __________________________________________________________      These reports are needed for  compliance    Please fax this completed form and a copy of the procedure report to our office located at [place] [address] as soon as possible to [phone] gene Anthony: Phone [phone]    We thank you for your assistance in treating our mutual patient

## 2021-02-16 NOTE — TELEPHONE ENCOUNTER
Upon review of the In Basket request we have found this is a duplicate request and no further action is needed  This request is currently being processed and documentation is being completed in the initial request  This message will now be closed  Any additional questions or concerns should be emailed to the Practice Liaisons via [email or web address] email, please do not reply via In Basket      Thank you  Ines Armstrong MA

## 2021-02-16 NOTE — TELEPHONE ENCOUNTER
As a follow-up, a second attempt has been made for outreach via fax, please see Contacts section for details       [phone]    Thank you  Brionna Mendoza MA

## 2021-02-16 NOTE — TELEPHONE ENCOUNTER
Upon review of the In Basket request and the patient's chart, initial outreach has been made via fax, please see Contacts section for details        [phone]  Dr Zacarias Ends    Thank you  Wallace Baeza MA

## 2021-02-22 ENCOUNTER — OFFICE VISIT (OUTPATIENT)
Dept: HEMATOLOGY ONCOLOGY | Facility: MEDICAL CENTER | Age: 36
End: 2021-02-22
Payer: COMMERCIAL

## 2021-02-22 VITALS
BODY MASS INDEX: 29.07 KG/M2 | HEART RATE: 55 BPM | HEIGHT: 67 IN | WEIGHT: 185.2 LBS | OXYGEN SATURATION: 100 % | DIASTOLIC BLOOD PRESSURE: 78 MMHG | TEMPERATURE: 97 F | RESPIRATION RATE: 18 BRPM | SYSTOLIC BLOOD PRESSURE: 108 MMHG

## 2021-02-22 DIAGNOSIS — K91.2 POSTSURGICAL MALABSORPTION: ICD-10-CM

## 2021-02-22 DIAGNOSIS — E53.8 B12 DEFICIENCY: Primary | ICD-10-CM

## 2021-02-22 DIAGNOSIS — E61.1 IRON DEFICIENCY: ICD-10-CM

## 2021-02-22 PROCEDURE — 99215 OFFICE O/P EST HI 40 MIN: CPT | Performed by: PHYSICIAN ASSISTANT

## 2021-02-22 RX ORDER — CYANOCOBALAMIN 1000 UG/ML
1000 INJECTION INTRAMUSCULAR; SUBCUTANEOUS ONCE
Status: CANCELLED
Start: 2021-03-19

## 2021-02-22 RX ORDER — SODIUM CHLORIDE 9 MG/ML
20 INJECTION, SOLUTION INTRAVENOUS ONCE
Status: CANCELLED | OUTPATIENT
Start: 2021-03-19

## 2021-02-22 RX ORDER — CYANOCOBALAMIN 1000 UG/ML
1000 INJECTION INTRAMUSCULAR; SUBCUTANEOUS ONCE
Status: CANCELLED | OUTPATIENT
Start: 2021-02-26

## 2021-02-22 NOTE — PROGRESS NOTES
[facility] [address] [address]  Hematology Ambulatory Follow-Up  Rupert Lui, 1985, [id number]  2/22/2021    Assessment/Plan:    1  B12 deficiency; 2  Postsurgical malabsorption ;3  Iron deficiency   this is a 78-year-old female with past medical history of gastric bypass surgery and with heavy menstruation who was found to be significantly iron deficient and B12 deficient  Patient underwent supplementation with Feraheme and iron studies responded appropriately  Unfortunately, patient is not responding to oral B12 supplements and therefore IM injections will be administered weekly over the next four weeks followed by monthly injections along with Feraheme maintenance  Regimen:   B12 1000 micro g IM weekly times 4; Then monthly with Feraheme   Feraheme 510 mg IV Q 28 days    Heavy menstrual bleeding:   Patient did have some resolution of heavy bleeding with oral contraceptive  We briefly discussed the utility of an IUD  Patient will discuss this with gyn on next visit  - Vitamin B12; Future  - cyanocobalamin injection 1,000 mcg  - Iron Panel (Includes Ferritin, Iron Sat%, Iron, and TIBC); Future  - CBC and differential; Future  - Occult Blood, Fecal Immunochemical; Future  - UA w Reflex to Microscopic w Reflex to Culture; Future  - CBC and differential    The patient is scheduled for follow-up in approximately 3 months  Patient voiced agreement and understanding to the above  Patient knows to call the Hematology/Oncology office with any questions and concerns regarding the above  Barrier(s) to care: None  The patient is able to self care   ------------------------------------------------------------------------------------------------------    Chief Complaint   Patient presents with    Follow-up     Iron Deficiency       History of present illness:    This is a 78-year-old female with history of heavy menstrual periods since a teenager who noted worsening issues with iron deficiency after donating blood in 2010  Patient recently underwent bariatric surgery in July 2020 and follow-up appointments demonstrated persistence of iron deficiency anemia and referral was made to Hematology  Patient is presently symptomatic with PICA-ice, on restful sleep, fatigue, hair loss, headaches and irritability  Patient notes that she is taking oral iron and is causing side effects such as constipation  Patient's iron studies prior to bariatric surgery demonstrate a pretty significant iron deficiency  Patient was never anemic and was asked to continue on oral iron supplementation  Since then, patient's symptoms have worsened  Patient also complains of heavy menstrual periods  Patient underwent evaluation with Gynecology  Patient had ultrasound completed that demonstrated a left ovarian cyst, likely benign without significant pathology within the gynecologic organs  Patient has only ever tried oral contraceptives to help lessen menstrual bleeding but this is not worked  Last time patient tried oral contraceptives was as a teenager  Patient is using a super plus tampon every hour during the heavy parts of her menstruation  EGD was completed in December 2019 without significant findings  Patient has never had a colonoscopy  Patient denies blood loss from her stool or her urine  2/21/20 WBC = 7 7, hemoglobin 12 5, MCV = 76, RDW 15 9, platelet count 436  8/20/15 ferritin = 6  8/12/20 WBC = 6 8, hemoglobin 12 1, hematocrit 30 2, MCV = 77, RDW 15 2, platelet count = 898  10/8/20 WBC = 4 4 hemoglobin = 11 2, MCV = 76, RDW 15 7, platelet count 790, ferritin = 7  12/4 & 12/18/2020: Feraheme x 2 doses  1/2021:  Maintenance Feraheme 510 IV q 28 days    1/5/2021:    WBC = 4 4, hemoglobin 11 8, MCV = 83, RDW = 17 4, platelet  = 745, ferritin =  91, iron saturation  = 27, B12 =  217    Interval history:   Patient notes noncompliance with follow-up with  UA and fecal occult blood testing  Additional guidance was provided today  Patient notes having headache with Feraheme, reversed with administration of Tylenol prior to infusion  Moreover, saline infusion given just 5 minutes prior to the start of Feraheme also help in delay significant side effects  I will make this adjustment to her therapy plan  Overall 50% improvement of energy level, no more PICA  Review of Systems   Constitutional: Positive for activity change and fatigue  Gastrointestinal: Positive for abdominal pain (associated with menstration)  Genitourinary: Positive for menstrual problem  All other systems reviewed and are negative        Patient Active Problem List   Diagnosis    Peripheral edema    Left wrist tendonitis    Multiple benign nevi    Acute non-recurrent pansinusitis    Anxiety    Class 3 severe obesity without serious comorbidity with body mass index (BMI) of 40 0 to 44 9 in adult [facility])    Atypical chest pain    Influenza vaccine administered    PCOS (polycystic ovarian syndrome)    Drug reaction    BEERNICE (obstructive sleep apnea)    Iron deficiency anemia secondary to inadequate dietary iron intake    Encounter for surgical aftercare following surgery of digestive system    Postsurgical malabsorption    Vitamin D deficiency    S/P gastric bypass    Class 1 obesity due to excess calories without serious comorbidity with body mass index (BMI) of 33 0 to 33 9 in adult    Iron deficiency    B12 deficiency       Past Medical History:   Diagnosis Date    Allergic rhinitis     Anemia 2012    ok currently    Anxiety     Asthma     activity induced    Benign neoplasm of skin of trunk     Morbid obesity with BMI of 40 0-44 9, adult (HCC)     Polycystic ovary syndrome     Postgastrectomy malabsorption     Skin tag     Sleep apnea     Tinea versicolor        Past Surgical History:   Procedure Laterality Date    CERVICAL CERCLAGE       SECTION  2017    PARAESOPHAGEAL HERNIA REPAIR N/A 2020    Procedure: REPAIR HERNIA PARAESOPHAGEAL  LAPAROSCOPIC;  Surgeon: Lala Urbina MD;  Location: [address] [place] [address];  Service: Bariatrics    LA LAP GASTRIC BYPASS/JENNIFER-EN-Y N/A 2020    Procedure: BYPASS GASTRIC  JENNIFER-EN-Y LAPAROSCOPIC;  Surgeon: Lala Urbina MD;  Location: [facility];  Service: [facility]         Family History   Problem Relation Age of Onset    Diabetes Mother     Heart disease Mother         CHF    Hypertension Mother    Maggy Sang Kidney cancer Mother         2020    Cancer Father         kidney    Aneurysm Father         AAA    COPD Father     Kidney disease Father     Kidney cancer Father         2010    No Known Problems Sister     Cancer Maternal Grandmother         bladder    Cancer Paternal Grandfather         brain    Stroke Paternal Grandfather     No Known Problems Sister     No Known Problems Sister        Social History     Socioeconomic History    Marital status:      Spouse name: None    Number of children: None    Years of education: None    Highest education level: None   Occupational History    None   Social Needs    Financial resource strain: None    Food insecurity     Worry: None     Inability: None    Transportation needs     Medical: None     Non-medical: None   Tobacco Use    Smoking status: Never Smoker    Smokeless tobacco: Never Used   Substance and Sexual Activity    Alcohol use: Not Currently     Comment: rarely    Drug use: No    Sexual activity: Yes   Lifestyle    Physical activity     Days per week: None     Minutes per session: None    Stress: None   Relationships    Social connections     Talks on phone: None     Gets together: None     Attends Yarsanism service: None     Active member of club or organization: None     Attends meetings of clubs or organizations: None     Relationship status: None    Intimate partner violence     Fear of current or ex partner: None     Emotionally abused: None     Physically abused: None     Forced sexual activity: None   Other Topics Concern    None   Social History Narrative    None         Current Outpatient Medications:     acetaminophen (TYLENOL) 325 mg tablet, Take 650 mg by mouth every 6 (six) hours as needed for mild pain, Disp: , Rfl:     cyanocobalamin (VITAMIN B-12) 100 MCG tablet, Take 1,000 mcg by mouth daily , Disp: , Rfl:     ergocalciferol (VITAMIN D2) 50,000 units, Take 1 capsule (50,000 Units total) by mouth 2 (two) times a week with meals, Disp: 24 capsule, Rfl: 0    escitalopram (LEXAPRO) 10 mg tablet, Take 10 mg by mouth daily, Disp: , Rfl:     furosemide (LASIX) 20 mg tablet, Take 20 mg by mouth as needed, Disp: , Rfl:     norgestrel-ethinyl estradiol (LO/OVRAL) 0 3 mg-30 mcg per tablet, Take 1 tablet by mouth daily, Disp: , Rfl:     methylPREDNISolone 4 MG tablet therapy pack, Use as directed on package (Patient not taking: Reported on 1/19/2021), Disp: 1 each, Rfl: 0    prochlorperazine (COMPAZINE) 10 mg tablet, Take 1 tablet (10 mg total) by mouth every 6 (six) hours as needed for nausea or vomiting (Patient not taking: Reported on 2/10/2021), Disp: 30 tablet, Rfl: 0    Allergies   Allergen Reactions    Valtrex [Valacyclovir] Swelling    Amoxicillin      Tingling of mouth        Objective:  /78 (BP Location: Left arm, Patient Position: Sitting, Cuff Size: Adult)   Pulse 55   Temp (!) 97 °F (36 1 °C)   Resp 18   Ht 5' 6 5" (1 689 m)   Wt 84 kg (185 lb 3 2 oz)   SpO2 100%   BMI 29 44 kg/m²    Physical Exam  Constitutional:       General: She is not in acute distress  Appearance: She is well-developed  HENT:      Head: Normocephalic and atraumatic  Eyes:      General: No scleral icterus  Pupils: Pupils are equal, round, and reactive to light     Cardiovascular:      Rate and Rhythm: Normal rate and regular rhythm  Heart sounds: No murmur  Pulmonary:      Effort: Pulmonary effort is normal  No respiratory distress  Skin:     General: Skin is warm  Coloration: Skin is not pale  Findings: No rash  Neurological:      Mental Status: She is alert and oriented to person, place, and time  Psychiatric:         Thought Content: Thought content normal          Result Review  Labs:  Lab Results   Component Value Date    IRON 67 01/05/2021    TIBC 246 (L) 01/05/2021    FERRITIN 91 01/05/2021     Lab Results   Component Value Date    WBC 4 4 01/05/2021    HGB 11 8 01/05/2021    HCT 38 1 01/05/2021    MCV 83 01/05/2021     01/05/2021     Lab Results   Component Value Date    SODIUM 140 01/05/2021    K 3 9 01/05/2021     (H) 01/05/2021    CO2 22 01/05/2021    AGAP 7 07/21/2020    BUN 8 01/05/2021    CREATININE 0 76 01/05/2021    GLUC 81 01/05/2021    CALCIUM 8 6 07/21/2020    AST 17 01/05/2021    ALT 25 01/05/2021    TP 6 3 01/05/2021    TBILI 0 4 01/05/2021    EGFR 74 07/21/2020     Lab Results   Component Value Date    UYUDHWWX97 217 (L) 01/05/2021       Please note: This report has been generated by a voice recognition software system  Therefore there may be syntax, spelling, and/or grammatical errors  Please call if you have any questions

## 2021-02-23 ENCOUNTER — TELEPHONE (OUTPATIENT)
Dept: BARIATRICS | Facility: CLINIC | Age: 36
End: 2021-02-23

## 2021-02-26 ENCOUNTER — HOSPITAL ENCOUNTER (OUTPATIENT)
Dept: INFUSION CENTER | Facility: HOSPITAL | Age: 36
Discharge: HOME | End: 2021-02-26
Attending: INTERNAL MEDICINE
Payer: COMMERCIAL

## 2021-02-26 VITALS — TEMPERATURE: 98.2 F

## 2021-02-26 DIAGNOSIS — E53.8 B12 DEFICIENCY: Primary | ICD-10-CM

## 2021-02-26 DIAGNOSIS — K91.2 POSTSURGICAL MALABSORPTION: ICD-10-CM

## 2021-02-26 PROCEDURE — 96372 THER/PROPH/DIAG INJ SC/IM: CPT

## 2021-02-26 RX ORDER — CYANOCOBALAMIN 1000 UG/ML
1000 INJECTION INTRAMUSCULAR; SUBCUTANEOUS ONCE
Status: CANCELLED | OUTPATIENT
Start: 2021-03-05

## 2021-02-26 RX ORDER — CYANOCOBALAMIN 1000 UG/ML
1000 INJECTION INTRAMUSCULAR; SUBCUTANEOUS ONCE
Status: COMPLETED | OUTPATIENT
Start: 2021-02-26 | End: 2021-02-26

## 2021-02-26 RX ADMIN — CYANOCOBALAMIN 1000 MCG: 1000 INJECTION, SOLUTION INTRAMUSCULAR; SUBCUTANEOUS at 15:22

## 2021-02-26 NOTE — PLAN OF CARE
Problem: SAFETY ADULT  Goal: Patient will remain free of falls  Description: INTERVENTIONS:  - Assess patient frequently for physical needs  -  Identify cognitive and physical deficits and behaviors that affect risk of falls    -  [place] fall precautions as indicated by assessment   - Educate patient/family on patient safety including physical limitations  - Instruct patient to call for assistance with activity based on assessment  - Modify environment to reduce risk of injury  - Consider OT/PT consult to assist with strengthening/mobility  Outcome: Progressing  Goal: Maintain or return to baseline ADL function  Description: INTERVENTIONS:  -  Assess patient's ability to carry out ADLs; assess patient's baseline for ADL function and identify physical deficits which impact ability to perform ADLs (bathing, care of mouth/teeth, toileting, grooming, dressing, etc )  - Assess/evaluate cause of self-care deficits   - Assess range of motion  - Assess patient's mobility; develop plan if impaired  - Assess patient's need for assistive devices and provide as appropriate  - Encourage maximum independence but intervene and supervise when necessary  - Involve family in performance of ADLs  - Assess for home care needs following discharge   - Consider OT consult to assist with ADL evaluation and planning for discharge  - Provide patient education as appropriate  Outcome: Progressing  Goal: Maintain or return mobility status to optimal level  Description: INTERVENTIONS:  - Assess patient's baseline mobility status (ambulation, transfers, stairs, etc )    - Identify cognitive and physical deficits and behaviors that affect mobility  - Identify mobility aids required to assist with transfers and/or ambulation (gait belt, sit-to-stand, lift, walker, cane, etc )  - [place] fall precautions as indicated by assessment  - Record patient progress and toleration of activity level on Mobility SBAR; progress patient to next Phase/Stage  - Instruct patient to call for assistance with activity based on assessment  - Consider rehabilitation consult to assist with strengthening/weightbearing, etc   Outcome: Progressing     Problem: Knowledge Deficit  Goal: Patient/family/caregiver demonstrates understanding of disease process, treatment plan, medications, and discharge instructions  Description: Complete learning assessment and assess knowledge base    Interventions:  - Provide teaching at level of understanding  - Provide teaching via preferred learning methods  Outcome: Progressing

## 2021-03-01 NOTE — TELEPHONE ENCOUNTER
As a final attempt, a third outreach has been made via telephone call  Please see Contacts section for details  This encounter will be closed and completed by end of day  Should we receive the requested information because of previous outreach attempts, the requested patient's chart will be updated appropriately     No Answer/did not leave a messag    Faxed another request [phone]    Thank you  Ja Wolfe MA

## 2021-03-05 ENCOUNTER — HOSPITAL ENCOUNTER (OUTPATIENT)
Dept: INFUSION CENTER | Facility: HOSPITAL | Age: 36
Discharge: HOME | End: 2021-03-05
Attending: INTERNAL MEDICINE
Payer: COMMERCIAL

## 2021-03-05 VITALS — TEMPERATURE: 97.8 F

## 2021-03-05 DIAGNOSIS — E53.8 B12 DEFICIENCY: Primary | ICD-10-CM

## 2021-03-05 DIAGNOSIS — K91.2 POSTSURGICAL MALABSORPTION: ICD-10-CM

## 2021-03-05 PROCEDURE — 96372 THER/PROPH/DIAG INJ SC/IM: CPT

## 2021-03-05 RX ORDER — CYANOCOBALAMIN 1000 UG/ML
1000 INJECTION INTRAMUSCULAR; SUBCUTANEOUS ONCE
Status: CANCELLED | OUTPATIENT
Start: 2021-03-12

## 2021-03-05 RX ORDER — CYANOCOBALAMIN 1000 UG/ML
1000 INJECTION INTRAMUSCULAR; SUBCUTANEOUS ONCE
Status: COMPLETED | OUTPATIENT
Start: 2021-03-05 | End: 2021-03-05

## 2021-03-05 RX ADMIN — CYANOCOBALAMIN 1000 MCG: 1000 INJECTION, SOLUTION INTRAMUSCULAR; SUBCUTANEOUS at 14:26

## 2021-03-05 NOTE — PLAN OF CARE
Problem: Knowledge Deficit  Goal: Patient/family/caregiver demonstrates understanding of disease process, treatment plan, medications, and discharge instructions  Description: Complete learning assessment and assess knowledge base    Interventions:  - Provide teaching at level of understanding  - Provide teaching via preferred learning methods  Outcome: Progressing     Problem: DISCHARGE PLANNING  Goal: Discharge to home or other facility with appropriate resources  Description: INTERVENTIONS:  - Identify barriers to discharge w/patient and caregiver  - Arrange for needed discharge resources and transportation as appropriate  - Identify discharge learning needs (meds, wound care, etc )  - Arrange for interpretive services to assist at discharge as needed  - Refer to Case Management Department for coordinating discharge planning if the patient needs post-hospital services based on physician/advanced practitioner order or complex needs related to functional status, cognitive ability, or social support system  Outcome: Progressing

## 2021-03-11 ENCOUNTER — TELEPHONE (OUTPATIENT)
Dept: OTHER | Facility: OTHER | Age: 36
End: 2021-03-11

## 2021-03-11 NOTE — TELEPHONE ENCOUNTER
TigerText:    [phone]/ Pt Marisol Tia SANCHEZ 1985/ Pt is experiencing acute abdominal pain 7 months post op (gastric bypass)

## 2021-03-12 ENCOUNTER — OFFICE VISIT (OUTPATIENT)
Dept: BARIATRICS | Facility: CLINIC | Age: 36
End: 2021-03-12
Payer: COMMERCIAL

## 2021-03-12 ENCOUNTER — HOSPITAL ENCOUNTER (OUTPATIENT)
Dept: INFUSION CENTER | Facility: HOSPITAL | Age: 36
Discharge: HOME | End: 2021-03-12
Attending: INTERNAL MEDICINE
Payer: COMMERCIAL

## 2021-03-12 VITALS
HEART RATE: 67 BPM | DIASTOLIC BLOOD PRESSURE: 56 MMHG | WEIGHT: 177 LBS | BODY MASS INDEX: 28.45 KG/M2 | RESPIRATION RATE: 16 BRPM | TEMPERATURE: 98.2 F | SYSTOLIC BLOOD PRESSURE: 94 MMHG | HEIGHT: 66 IN

## 2021-03-12 VITALS — TEMPERATURE: 98.3 F

## 2021-03-12 DIAGNOSIS — E53.8 B12 DEFICIENCY: Primary | ICD-10-CM

## 2021-03-12 DIAGNOSIS — R10.10 PAIN OF UPPER ABDOMEN: ICD-10-CM

## 2021-03-12 DIAGNOSIS — K91.2 POSTSURGICAL MALABSORPTION: Primary | ICD-10-CM

## 2021-03-12 DIAGNOSIS — K91.2 POSTSURGICAL MALABSORPTION: ICD-10-CM

## 2021-03-12 PROCEDURE — 1036F TOBACCO NON-USER: CPT | Performed by: PHYSICIAN ASSISTANT

## 2021-03-12 PROCEDURE — 99212 OFFICE O/P EST SF 10 MIN: CPT | Performed by: PHYSICIAN ASSISTANT

## 2021-03-12 PROCEDURE — 96372 THER/PROPH/DIAG INJ SC/IM: CPT

## 2021-03-12 PROCEDURE — 3008F BODY MASS INDEX DOCD: CPT | Performed by: PHYSICIAN ASSISTANT

## 2021-03-12 RX ORDER — CYANOCOBALAMIN 1000 UG/ML
1000 INJECTION INTRAMUSCULAR; SUBCUTANEOUS ONCE
Status: COMPLETED | OUTPATIENT
Start: 2021-03-12 | End: 2021-03-12

## 2021-03-12 RX ORDER — PANTOPRAZOLE SODIUM 40 MG/1
40 TABLET, DELAYED RELEASE ORAL 2 TIMES DAILY
Qty: 90 TABLET | Refills: 0 | Status: SHIPPED | OUTPATIENT
Start: 2021-03-12 | End: 2021-06-17

## 2021-03-12 RX ORDER — SUCRALFATE 1 G/1
1 TABLET ORAL 4 TIMES DAILY
Qty: 120 TABLET | Refills: 0 | Status: SHIPPED | OUTPATIENT
Start: 2021-03-12 | End: 2021-12-02

## 2021-03-12 RX ORDER — CYANOCOBALAMIN 1000 UG/ML
1000 INJECTION INTRAMUSCULAR; SUBCUTANEOUS ONCE
Status: CANCELLED | OUTPATIENT
Start: 2021-03-12

## 2021-03-12 RX ORDER — PANTOPRAZOLE SODIUM 40 MG/1
40 TABLET, DELAYED RELEASE ORAL DAILY
Qty: 60 TABLET | Refills: 9 | Status: SHIPPED | OUTPATIENT
Start: 2021-03-12 | End: 2021-03-12

## 2021-03-12 RX ADMIN — CYANOCOBALAMIN 1000 MCG: 1000 INJECTION, SOLUTION INTRAMUSCULAR; SUBCUTANEOUS at 14:36

## 2021-03-12 NOTE — PROGRESS NOTES
Assessment/Plan:    Pain of upper abdomen  -Gallstones vs marginal ulcer  -Stat ultrasound of gallbladder  -Start Protonix 40mg BID and carafate 1g QID  -Advised boyfriend must stop smoking d/t risks of second hand smoke  -Patient stable, abdomen tender but no rebound/guarding; she does not need to be evaluated in ED at this time, but advised her to go to ED if she develops severe pain again, or fevers, chills, N/V  -She will update me next week         Diagnoses and all orders for this visit:    Postsurgical malabsorption    Pain of upper abdomen  -     US gallbladder; Future  -     Discontinue: pantoprazole (PROTONIX) 40 mg tablet; Take 1 tablet (40 mg total) by mouth daily  -     sucralfate (CARAFATE) 1 g tablet; Take 1 tablet (1 g total) by mouth 4 (four) times a day  -     pantoprazole (PROTONIX) 40 mg tablet; Take 1 tablet (40 mg total) by mouth 2 (two) times a day          Subjective:      Patient ID: Ginny Jeter is a 38 y o  female  -s/p Teodoro-En-Y Gastric Bypass and repair of paraesophageal hernia repair with Dr Maru Stockton on 07/20/20  Presents today with c/o sudden onset of severe mid-epigastric and upper quadrant pain along costal margin that started yesterday afternoon  The pain radiated to her back yesterday  She was cleaning her house and had not recently eaten or drank anything  She called the on-call doc and was advised to take Tylenol report to ED for worsening symptoms  She reports the pain 30/10 and almost went to the ED  She reports pain greatly improved today but she still feels residual pain in her mid-epigastric and LUQ that is worse with food  She denies NSAIDS or smoking but her boyfriend smokes outside  She denies dysphagia, reflux, N/V, fevers, chills, sweats, SOB, CP, diarrhea, constipation  She has had some light colored stools recently       The following portions of the patient's history were reviewed and updated as appropriate: allergies, current medications, past family history, past medical history, past social history, past surgical history and problem list     Review of Systems   Constitutional: Negative for chills and fever  Unexpected weight change: planned weight loss  HENT: Negative for trouble swallowing  Respiratory: Negative for cough and shortness of breath  Cardiovascular: Negative for chest pain and palpitations  Gastrointestinal: Positive for abdominal pain  Negative for constipation, diarrhea, nausea and vomiting  Neurological: Negative for dizziness  Psychiatric/Behavioral:        Denies anxiety and depression         Objective:      BP 94/56 (BP Location: Left arm, Patient Position: Sitting, Cuff Size: Adult)   Pulse 67   Temp 98 2 °F (36 8 °C)   Resp 16   Ht 5' 6" (1 676 m)   Wt 80 3 kg (177 lb)   BMI 28 57 kg/m²     Colonoscopy-Not applicable       Physical Exam  Vitals signs reviewed  Constitutional:       General: She is not in acute distress  Appearance: She is well-developed  HENT:      Head: Normocephalic and atraumatic  Eyes:      General: No scleral icterus  Pupils: Pupils are equal, round, and reactive to light  Cardiovascular:      Rate and Rhythm: Normal rate and regular rhythm  Pulmonary:      Effort: Pulmonary effort is normal  No respiratory distress  Abdominal:      General: There is no distension  Palpations: Abdomen is soft  Tenderness: There is abdominal tenderness (tender over mid-epigastric to light palpation, tender over R and LUQ to deep palpation)  There is no guarding or rebound  Comments: No incisional hernias appreciated   Skin:     General: Skin is warm and dry  Neurological:      Mental Status: She is alert and oriented to person, place, and time     Psychiatric:         Mood and Affect: Mood normal          Behavior: Behavior normal

## 2021-03-12 NOTE — ASSESSMENT & PLAN NOTE
-Gallstones vs marginal ulcer  -Stat ultrasound of gallbladder  -Start Protonix 40mg BID and carafate 1g QID  -Advised boyfriend must stop smoking d/t risks of second hand smoke  -Patient stable, abdomen tender but no rebound/guarding; she does not need to be evaluated in ED at this time, but advised her to go to ED if she develops severe pain again, or fevers, chills, N/V  -She will update me next week

## 2021-03-12 NOTE — TELEPHONE ENCOUNTER
I called patient, patient reports central and epigastric pain that started suddenly, colicky in nature  Patient denies nausea, vomiting, change of bowel habits  she denies smoking, steroid, or NSAID use     advised patient to take Tylenol, if she has persistent  or worsening symptoms, patient to go to the emergency department for evaluation

## 2021-03-13 ENCOUNTER — HOSPITAL ENCOUNTER (OUTPATIENT)
Dept: RADIOLOGY | Facility: HOSPITAL | Age: 36
Discharge: HOME | End: 2021-03-13
Payer: COMMERCIAL

## 2021-03-13 DIAGNOSIS — R10.10 PAIN OF UPPER ABDOMEN: ICD-10-CM

## 2021-03-13 PROCEDURE — 76705 ECHO EXAM OF ABDOMEN: CPT

## 2021-03-15 ENCOUNTER — TELEPHONE (OUTPATIENT)
Dept: BARIATRICS | Facility: CLINIC | Age: 36
End: 2021-03-15

## 2021-03-15 DIAGNOSIS — K80.20 GALLSTONES: Primary | ICD-10-CM

## 2021-03-15 NOTE — TELEPHONE ENCOUNTER
Thank you! Please inform patient to start PPI and carafate ASAP, she was supposed to start that immediately  I placed a referral to general surgery as well to discuss results of her gallbladder ultrasound in the event that Dr Gabriel Benz feels her gallbladder should be removed   Please have her update us with her progress, thanks

## 2021-03-15 NOTE — TELEPHONE ENCOUNTER
I called and spoke to patient, she reports her pain not as severe as the first onset, pt stated it just feels like a dull constant ache  Pt did not start Carafate or PPI yet she was told to wait for results for U/S, I advised her of those results and she will start today, advised her if this does not improve to please call us

## 2021-03-15 NOTE — TELEPHONE ENCOUNTER
Please call patient and inquire about her progress  Is she still having abdominal pain? Ultrasound shows some stones/gravel and small polyps, which are likely benign and do not require further workup  Did she start the carafate and PPI?   Thank you

## 2021-03-19 ENCOUNTER — HOSPITAL ENCOUNTER (OUTPATIENT)
Dept: INFUSION CENTER | Facility: HOSPITAL | Age: 36
Discharge: HOME | End: 2021-03-19
Attending: INTERNAL MEDICINE
Payer: COMMERCIAL

## 2021-03-19 VITALS
HEART RATE: 58 BPM | DIASTOLIC BLOOD PRESSURE: 80 MMHG | OXYGEN SATURATION: 100 % | TEMPERATURE: 98.2 F | RESPIRATION RATE: 16 BRPM | SYSTOLIC BLOOD PRESSURE: 126 MMHG

## 2021-03-19 DIAGNOSIS — E61.1 IRON DEFICIENCY: Primary | ICD-10-CM

## 2021-03-19 DIAGNOSIS — E53.8 B12 DEFICIENCY: ICD-10-CM

## 2021-03-19 DIAGNOSIS — K91.2 POSTSURGICAL MALABSORPTION: ICD-10-CM

## 2021-03-19 PROCEDURE — 96372 THER/PROPH/DIAG INJ SC/IM: CPT

## 2021-03-19 PROCEDURE — 96365 THER/PROPH/DIAG IV INF INIT: CPT

## 2021-03-19 RX ORDER — CYANOCOBALAMIN 1000 UG/ML
1000 INJECTION INTRAMUSCULAR; SUBCUTANEOUS ONCE
Status: COMPLETED | OUTPATIENT
Start: 2021-03-19 | End: 2021-03-19

## 2021-03-19 RX ORDER — SODIUM CHLORIDE 9 MG/ML
20 INJECTION, SOLUTION INTRAVENOUS ONCE
Status: COMPLETED | OUTPATIENT
Start: 2021-03-19 | End: 2021-03-19

## 2021-03-19 RX ORDER — SODIUM CHLORIDE 9 MG/ML
20 INJECTION, SOLUTION INTRAVENOUS ONCE
Status: CANCELLED | OUTPATIENT
Start: 2021-04-16

## 2021-03-19 RX ORDER — CYANOCOBALAMIN 1000 UG/ML
1000 INJECTION INTRAMUSCULAR; SUBCUTANEOUS ONCE
Status: CANCELLED
Start: 2021-04-16

## 2021-03-19 RX ADMIN — FERUMOXYTOL 510 MG: 510 INJECTION INTRAVENOUS at 13:22

## 2021-03-19 RX ADMIN — CYANOCOBALAMIN 1000 MCG: 1000 INJECTION, SOLUTION INTRAMUSCULAR; SUBCUTANEOUS at 13:22

## 2021-03-19 RX ADMIN — SODIUM CHLORIDE 20 ML/HR: 9 INJECTION, SOLUTION INTRAVENOUS at 13:10

## 2021-03-25 ENCOUNTER — TELEPHONE (OUTPATIENT)
Dept: FAMILY MEDICINE CLINIC | Facility: HOSPITAL | Age: 36
End: 2021-03-25

## 2021-03-25 NOTE — TELEPHONE ENCOUNTER
Patient requires a form to be completed  Patient is aware of 5-7 business day turn around time  Please refer to the following information:       Type of Form:Physical Examination Form    Date of Visit (if applicable): 0/20/14    Doctor: Haydee Iglesias      How patient would like to receive form:      Patient phone number: [phone]      Copy scanned to encounter  Copy provided to patient  Original in red team folder at nurse station to be completed

## 2021-03-25 NOTE — TELEPHONE ENCOUNTER
Please call patient and schedule physical appointment  Form will be in red team needs attention folder at nurses station for physical appointment

## 2021-03-26 NOTE — TELEPHONE ENCOUNTER
Patient is now set up for an apt on 4/5/21 with Dr Gaudencio Escamilla in regards to below information  Thank you!

## 2021-03-30 ENCOUNTER — TELEPHONE (OUTPATIENT)
Dept: FAMILY MEDICINE CLINIC | Facility: CLINIC | Age: 36
End: 2021-03-30

## 2021-04-05 ENCOUNTER — OFFICE VISIT (OUTPATIENT)
Dept: FAMILY MEDICINE CLINIC | Facility: CLINIC | Age: 36
End: 2021-04-05
Payer: COMMERCIAL

## 2021-04-05 VITALS
OXYGEN SATURATION: 97 % | HEIGHT: 66 IN | RESPIRATION RATE: 16 BRPM | DIASTOLIC BLOOD PRESSURE: 64 MMHG | BODY MASS INDEX: 27.9 KG/M2 | HEART RATE: 86 BPM | SYSTOLIC BLOOD PRESSURE: 116 MMHG | TEMPERATURE: 98 F | WEIGHT: 173.6 LBS

## 2021-04-05 DIAGNOSIS — Z23 ENCOUNTER FOR IMMUNIZATION: ICD-10-CM

## 2021-04-05 DIAGNOSIS — Z00.00 ANNUAL PHYSICAL EXAM: Primary | ICD-10-CM

## 2021-04-05 DIAGNOSIS — Z13.220 SCREENING FOR HYPERLIPIDEMIA: ICD-10-CM

## 2021-04-05 PROCEDURE — 3725F SCREEN DEPRESSION PERFORMED: CPT | Performed by: FAMILY MEDICINE

## 2021-04-05 PROCEDURE — 1036F TOBACCO NON-USER: CPT | Performed by: FAMILY MEDICINE

## 2021-04-05 PROCEDURE — 3008F BODY MASS INDEX DOCD: CPT | Performed by: FAMILY MEDICINE

## 2021-04-05 PROCEDURE — 99395 PREV VISIT EST AGE 18-39: CPT | Performed by: FAMILY MEDICINE

## 2021-04-05 PROCEDURE — 90471 IMMUNIZATION ADMIN: CPT | Performed by: FAMILY MEDICINE

## 2021-04-05 PROCEDURE — 90715 TDAP VACCINE 7 YRS/> IM: CPT | Performed by: FAMILY MEDICINE

## 2021-04-05 NOTE — PROGRESS NOTES
[facility]    NAME: Marisol Weber  AGE: 38 y o  SEX: female  : 1985     DATE: 2021     Assessment and Plan:     Problem List Items Addressed This Visit     None      Visit Diagnoses     Annual physical exam    -  Primary    Relevant Orders    Lipid panel    Screening for hyperlipidemia        Relevant Orders    Lipid panel    BMI 28 0-28 9,adult        Relevant Orders    Lipid panel        -Patient states she has a 100 dollar balance with [facility] [employer] and can not sign up for COVID vaccine through [employer] or log in to [employer]  She was given information on clinics and pharmacies which carry the vaccine  --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  She is cleared from a physical perspective but requires documentation on MMR, Hep B, Tetanus, and Chickenpox vaccines which are not in Epic system  Patient will go home and try to find her vaccine records to be filled out on form  If she can not find them, she will require Tit[employer] for the above  ADDENDUM: patient returned with copy of immunization records  She required TDap which she received today  Immunizations and preventive care screenings were discussed with patient today  Appropriate education was printed on patient's after visit summary  Counseling:  Alcohol/drug use: discussed moderation in alcohol intake, the recommendations for healthy alcohol use, and avoidance of illicit drug use  Dental Health: discussed importance of regular tooth brushing, flossing, and dental visits  Injury prevention: discussed safety/seat belts, safety helmets, smoke detectors, carbon dioxide detectors, and smoking near bedding or upholstery  Sexual health: discussed sexually transmitted diseases, partner selection, use of condoms, avoidance of unintended pregnancy, and contraceptive alternatives    · Exercise: the importance of regular exercise/physical activity was discussed  Recommend exercise 3-5 times per week for at least 30 minutes  BMI Counseling: Body mass index is 28 02 kg/m²  The BMI is above normal  Nutrition recommendations include decreasing portion sizes, decreasing fast food intake, limiting drinks that contain sugar, moderation in carbohydrate intake, increasing intake of lean protein and reducing intake of cholesterol  Exercise recommendations include exercising 3-5 times per week  Depression Screening and Follow-up Plan: Patient's depression screening was positive with a PHQ-2 score of 0  Clincally patient does not have depression  No treatment is required  Return in 1 year (on 4/5/2022)  Chief Complaint:     Chief Complaint   Patient presents with    Annual Exam      History of Present Illness:     Adult Annual Physical   Patient here for a comprehensive physical exam  The patient reports no problems  Diet and Physical Activity  · Diet/Nutrition: well balanced diet, heart healthy (low sodium) diet, limited junk food and low carb diet  Bariatric surgery July 2020, Gastic Teodoro-en y  Iron infusion and B12 injections once per month  · Exercise: no formal exercise  Depression Screening  PHQ-9 Depression Screening    PHQ-9:   Frequency of the following problems over the past two weeks:      Little interest or pleasure in doing things: 0 - not at all  Feeling down, depressed, or hopeless: 0 - not at all  PHQ-2 Score: 0         General Health  · Sleep: sleeps well and gets 7-8 hours of sleep on average  · Hearing: normal - bilateral   · Vision: no vision problems, most recent eye exam <1 year ago and wears glasses  · Dental: regular dental visits, brushes teeth twice daily and flosses teeth occasionally  · BMI 44 prior to Teodoro-en y surgery  106 lb weight loss since July  BMI today 28 03    /GYN Health  · Last menstrual period: 3/5/21  · Contraceptive method: Tubal ligation in 2017    · History of STDs?: no   · Sexually active with single male partner   this past year  · Last Pap Smear with HPV co-testing negative in 2020 at Dr Joe Rodas office      Review of Systems:     Review of Systems   Constitutional: Negative for fever  HENT: Negative for sore throat  Eyes: Negative for visual disturbance  Respiratory: Negative for cough and shortness of breath  Cardiovascular: Negative for chest pain and palpitations  Gastrointestinal: Negative for abdominal pain, blood in stool, constipation, diarrhea, nausea and vomiting  Genitourinary: Negative for dyspareunia, dysuria, flank pain, hematuria, vaginal bleeding and vaginal discharge  Neurological: Negative for dizziness and headaches        Past Medical History:     Past Medical History:   Diagnosis Date    Allergic rhinitis     Anemia 2012    ok currently    Anxiety     Asthma     activity induced    Benign neoplasm of skin of trunk     Morbid obesity with BMI of 40 0-44 9, adult (HCC)     Polycystic ovary syndrome     Postgastrectomy malabsorption     Skin tag     Sleep apnea     Tinea versicolor       Past Surgical History:     Past Surgical History:   Procedure Laterality Date    CERVICAL CERCLAGE       SECTION  2017    PARAESOPHAGEAL HERNIA REPAIR N/A 2020    Procedure: REPAIR HERNIA PARAESOPHAGEAL  LAPAROSCOPIC;  Surgeon: Reji Barrett MD;  Location: [facility];  Service: Bariatrics    MD LAP GASTRIC BYPASS/JENNIFER-EN-Y N/A 2020    Procedure: BYPASS GASTRIC  JENNIFER-EN-Y LAPAROSCOPIC;  Surgeon: Reji Barrett MD;  Location: [address] [place] [address];  Service: Bariatrics    TONSILLECTOMY      TUBAL LIGATION        Social History:        Social History     Socioeconomic History    Marital status:      Spouse name: None    Number of children: None    Years of education: None    Highest education level: None   Occupational History    None   Social Needs    Financial resource strain: None    Food insecurity     Worry: None     Inability: None    Transportation needs     Medical: None     Non-medical: None   Tobacco Use    Smoking status: Never Smoker    Smokeless tobacco: Never Used   Substance and Sexual Activity    Alcohol use: Not Currently     Comment: rarely    Drug use: No    Sexual activity: Yes   Lifestyle    Physical activity     Days per week: None     Minutes per session: None    Stress: None   Relationships    Social connections     Talks on phone: None     Gets together: None     Attends Hinduism service: None     Active member of club or organization: None     Attends meetings of clubs or organizations: None     Relationship status: None    Intimate partner violence     Fear of current or ex partner: None     Emotionally abused: None     Physically abused: None     Forced sexual activity: None   Other Topics Concern    None   Social History Narrative    None      Family History:     Family History   Problem Relation Age of Onset    Diabetes Mother     Heart disease Mother         CHF    Hypertension Mother     Kidney cancer Mother         5/2020    Cancer Father         kidney    Aneurysm Father         AAA    COPD Father     Kidney disease Father     Kidney cancer Father         2010    No Known Problems Sister     Cancer Maternal Grandmother         bladder    Cancer Paternal Grandfather         brain    Stroke Paternal Grandfather     No Known Problems Sister     No Known Problems Sister       Current Medications:     Current Outpatient Medications   Medication Sig Dispense Refill    acetaminophen (TYLENOL) 325 mg tablet Take 650 mg by mouth every 6 (six) hours as needed for mild pain      cyanocobalamin (VITAMIN B-12) 100 MCG tablet Take 1,000 mcg by mouth daily       ergocalciferol (VITAMIN D2) 50,000 units Take 1 capsule (50,000 Units total) by mouth 2 (two) times a week with meals 24 capsule 0    escitalopram (LEXAPRO) 10 mg tablet Take 10 mg by mouth daily      furosemide (LASIX) 20 mg tablet Take 20 mg by mouth as needed      norgestrel-ethinyl estradiol (LO/OVRAL) 0 3 mg-30 mcg per tablet Take 1 tablet by mouth daily      pantoprazole (PROTONIX) 40 mg tablet Take 1 tablet (40 mg total) by mouth 2 (two) times a day 90 tablet 0    sucralfate (CARAFATE) 1 g tablet Take 1 tablet (1 g total) by mouth 4 (four) times a day 120 tablet 0    methylPREDNISolone 4 MG tablet therapy pack Use as directed on package (Patient not taking: Reported on 4/5/2021) 1 each 0    prochlorperazine (COMPAZINE) 10 mg tablet Take 1 tablet (10 mg total) by mouth every 6 (six) hours as needed for nausea or vomiting (Patient not taking: Reported on 2/10/2021) 30 tablet 0     No current facility-administered medications for this visit  Allergies: Allergies   Allergen Reactions    Valtrex [Valacyclovir] Swelling    Amoxicillin      Tingling of mouth       Physical Exam:     /64   Pulse 86   Temp 98 °F (36 7 °C) (Tympanic)   Resp 16   Ht 5' 6" (1 676 m)   Wt 78 7 kg (173 lb 9 6 oz)   LMP 03/06/2021   SpO2 97%   BMI 28 02 kg/m²     Physical Exam  Constitutional:       General: She is not in acute distress  Appearance: Normal appearance  She is not ill-appearing or toxic-appearing  HENT:      Head: Normocephalic  Right Ear: Tympanic membrane normal       Left Ear: Tympanic membrane normal       Nose: No congestion or rhinorrhea  Mouth/Throat:      Mouth: Mucous membranes are moist       Pharynx: No oropharyngeal exudate or posterior oropharyngeal erythema  Eyes:      Conjunctiva/sclera: Conjunctivae normal       Pupils: Pupils are equal, round, and reactive to light  Neck:      Musculoskeletal: Normal range of motion  No muscular tenderness  Cardiovascular:      Rate and Rhythm: Normal rate and regular rhythm  Pulses: Normal pulses  Heart sounds: Normal heart sounds  No murmur     Pulmonary:      Effort: Pulmonary effort is normal  No respiratory distress  Breath sounds: Normal breath sounds  No wheezing or rales  Abdominal:      General: Bowel sounds are normal       Palpations: Abdomen is soft  Tenderness: There is no abdominal tenderness  There is no guarding or rebound  Musculoskeletal: Normal range of motion  Right lower leg: No edema  Left lower leg: No edema  Lymphadenopathy:      Cervical: No cervical adenopathy  Skin:     General: Skin is warm and dry  Capillary Refill: Capillary refill takes less than 2 seconds  Coloration: Skin is not jaundiced  Findings: No erythema or rash  Neurological:      General: No focal deficit present  Mental Status: She is alert and oriented to person, place, and time  Sensory: No sensory deficit  Motor: No weakness  Deep Tendon Reflexes: Reflexes normal    Psychiatric:         Mood and Affect: Mood normal          Behavior: Behavior normal          Thought Content:  Thought content normal          Judgment: Judgment normal           Mariliatoramon Garcia MD   [address]

## 2021-04-05 NOTE — TELEPHONE ENCOUNTER
Physical form in bin by Nurse station  Physical portion is filled out however patient will return with her vaccine records if she can find them  Otherwise will need blood titers

## 2021-04-05 NOTE — PATIENT INSTRUCTIONS

## 2021-04-15 ENCOUNTER — TELEPHONE (OUTPATIENT)
Dept: HEMATOLOGY ONCOLOGY | Facility: MEDICAL CENTER | Age: 36
End: 2021-04-15

## 2021-04-15 NOTE — TELEPHONE ENCOUNTER
A voice message was left for Marisol  The [facility], Friday 4/16/21 feraheme has been cancelled due to needing new iron studies prior to the insurance authorizing the maintenance feraheme  The feraheme will be rescheduled after the authorization is granted  Marisol returned the call  She will have her labs drawn Friday 4/16/21

## 2021-04-16 ENCOUNTER — HOSPITAL ENCOUNTER (OUTPATIENT)
Dept: INFUSION CENTER | Facility: HOSPITAL | Age: 36
Discharge: HOME | End: 2021-04-16
Attending: INTERNAL MEDICINE

## 2021-05-05 ENCOUNTER — TELEPHONE (OUTPATIENT)
Dept: FAMILY MEDICINE CLINIC | Facility: CLINIC | Age: 36
End: 2021-05-05

## 2021-05-05 NOTE — TELEPHONE ENCOUNTER
Called to reschedule 5/12 appt with Dr Marlo Hernandez  He will not be in office that day  Reached voicemail  Left msg asking patient to return our call

## 2021-05-12 ENCOUNTER — TELEPHONE (OUTPATIENT)
Dept: HEMATOLOGY ONCOLOGY | Facility: MEDICAL CENTER | Age: 36
End: 2021-05-12

## 2021-05-12 NOTE — TELEPHONE ENCOUNTER
Spoke with patient regarding 5/14 infusion appt for iron infusion  This still has not been approved by insurance  She still has not completed necessary lab work  She states she will be going tomorrow  She will call us back to reschedule iron infusion once she has completed lab work

## 2021-05-14 ENCOUNTER — HOSPITAL ENCOUNTER (OUTPATIENT)
Dept: INFUSION CENTER | Facility: HOSPITAL | Age: 36
Discharge: HOME | End: 2021-05-14
Attending: INTERNAL MEDICINE

## 2021-05-21 ENCOUNTER — TELEPHONE (OUTPATIENT)
Dept: HEMATOLOGY ONCOLOGY | Facility: MEDICAL CENTER | Age: 36
End: 2021-05-21

## 2021-05-21 NOTE — TELEPHONE ENCOUNTER
Called and spoke to patient regarding the need for labs to be completed for her office visit with Negrita Earl on 5/24/21  Patient voiced understanding and stated she will go this weekend  Orders were faxed to [address] [place] [address] in [place]

## 2021-05-25 ENCOUNTER — DOCUMENTATION (OUTPATIENT)
Dept: HEMATOLOGY ONCOLOGY | Facility: MEDICAL CENTER | Age: 36
End: 2021-05-25

## 2021-05-25 NOTE — PROGRESS NOTES
Left voicemail for patient that she needs to have blood work done and also will need to schedule a follow up appointment

## 2021-06-04 ENCOUNTER — TELEPHONE (OUTPATIENT)
Dept: HEMATOLOGY ONCOLOGY | Facility: CLINIC | Age: 36
End: 2021-06-04

## 2021-06-04 LAB
BASOPHILS # BLD AUTO: 0 X10E3/UL (ref 0–0.2)
BASOPHILS NFR BLD AUTO: 1 %
EOSINOPHIL # BLD AUTO: 0.1 X10E3/UL (ref 0–0.4)
EOSINOPHIL NFR BLD AUTO: 3 %
ERYTHROCYTE [DISTWIDTH] IN BLOOD BY AUTOMATED COUNT: 13.5 % (ref 11.7–15.4)
HCT VFR BLD AUTO: 42.1 % (ref 34–46.6)
HGB BLD-MCNC: 13.9 G/DL (ref 11.1–15.9)
IMM GRANULOCYTES # BLD: 0 X10E3/UL (ref 0–0.1)
IMM GRANULOCYTES NFR BLD: 0 %
LYMPHOCYTES # BLD AUTO: 1.5 X10E3/UL (ref 0.7–3.1)
LYMPHOCYTES NFR BLD AUTO: 27 %
MCH RBC QN AUTO: 28.8 PG (ref 26.6–33)
MCHC RBC AUTO-ENTMCNC: 33 G/DL (ref 31.5–35.7)
MCV RBC AUTO: 87 FL (ref 79–97)
MONOCYTES # BLD AUTO: 0.4 X10E3/UL (ref 0.1–0.9)
MONOCYTES NFR BLD AUTO: 6 %
NEUTROPHILS # BLD AUTO: 3.5 X10E3/UL (ref 1.4–7)
NEUTROPHILS NFR BLD AUTO: 63 %
PLATELET # BLD AUTO: 184 X10E3/UL (ref 150–450)
RBC # BLD AUTO: 4.83 X10E6/UL (ref 3.77–5.28)
VIT B12 SERPL-MCNC: 283 PG/ML (ref 232–1245)
WBC # BLD AUTO: 5.5 X10E3/UL (ref 3.4–10.8)

## 2021-06-04 NOTE — TELEPHONE ENCOUNTER
Appointment Confirmation     Appointment with  Akbar Verde   Appointment date & time 6-17-21 @ 8:30am    Location [facility]    Patient verbilized Understanding

## 2021-06-15 ENCOUNTER — TELEPHONE (OUTPATIENT)
Dept: HEMATOLOGY ONCOLOGY | Facility: CLINIC | Age: 36
End: 2021-06-15

## 2021-06-15 ENCOUNTER — TELEPHONE (OUTPATIENT)
Dept: HEMATOLOGY ONCOLOGY | Facility: MEDICAL CENTER | Age: 36
End: 2021-06-15

## 2021-06-15 NOTE — TELEPHONE ENCOUNTER
Appointment Confirmation     Appointment with  Dr Rosana Cedeno   Appointment date & time 06/17 at 8:30am   [facility]   Patient verbilized Understanding  yes

## 2021-06-17 ENCOUNTER — OFFICE VISIT (OUTPATIENT)
Dept: HEMATOLOGY ONCOLOGY | Facility: MEDICAL CENTER | Age: 36
End: 2021-06-17
Payer: COMMERCIAL

## 2021-06-17 VITALS
OXYGEN SATURATION: 100 % | TEMPERATURE: 98.3 F | HEART RATE: 63 BPM | SYSTOLIC BLOOD PRESSURE: 126 MMHG | HEIGHT: 66 IN | WEIGHT: 174.2 LBS | DIASTOLIC BLOOD PRESSURE: 80 MMHG | BODY MASS INDEX: 28 KG/M2 | RESPIRATION RATE: 18 BRPM

## 2021-06-17 DIAGNOSIS — E53.8 B12 DEFICIENCY: ICD-10-CM

## 2021-06-17 DIAGNOSIS — D50.8 IRON DEFICIENCY ANEMIA FOLLOWING BARIATRIC SURGERY: Primary | ICD-10-CM

## 2021-06-17 DIAGNOSIS — K95.89 IRON DEFICIENCY ANEMIA FOLLOWING BARIATRIC SURGERY: Primary | ICD-10-CM

## 2021-06-17 PROCEDURE — 3008F BODY MASS INDEX DOCD: CPT | Performed by: FAMILY MEDICINE

## 2021-06-17 PROCEDURE — 99215 OFFICE O/P EST HI 40 MIN: CPT | Performed by: PHYSICIAN ASSISTANT

## 2021-06-17 NOTE — PROGRESS NOTES
[facility] [address] [address]  Hematology Ambulatory Follow-Up  Rachael Arteaga, 1985, [id number]  6/17/2021    Assessment/Plan:    1  Iron deficiency anemia following bariatric surgery  This is a 55-year-old female with history of iron deficiency anemia secondary to bariatric surgery and heavy menstrual bleeding  Patient was on birth control but has not noticed much improvement and heavy menstrual bleeding days  Patient will contact gynecologic physician to discuss IUD  We discussed this at length today  Patient was lost to follow-up secondary to scheduling issues her behalf  Patient is willing to get started back on IV iron maintenance  Unfortunately, iron panel is not completed with blood work taken today  Patient will have iron panel reassessed at the 1st IV iron infusion  Patient will go back on Feraheme as a maintenance protocol  Patient had headache and mild nausea with the 1st infusion but this spontaneously resolved without any intervention patient has not had any issues since  Patient will follow-up in approximately four months with blood work prior  Patient will not be able to start IV treatments for another month due to scheduling restrictions  Patient's choice  Regimen:  Feraheme 510 mg IV Q 28 days    - Iron Panel (Includes Ferritin, Iron Sat%, Iron, and TIBC); Future  - Ferritin; Future  - CBC and differential; Future  - Ferritin  - CBC and differential    2  B12 deficiency  As discussed above, patient was lost to follow-up last injection was given in March  Repeat blood testing demonstrates borderline B12 deficiency at a level of 200  I have requested that the patient go on four weekly IM injections followed by monthly injections  Patient will also start taking 1000 micro g by mouth at least 3 times a week  Patient likes to use the liquid oral medication    This is acceptable  Regimen:  B12 1000 micro g IM weekly x5 doses  Followed by  B12 1000 micro g IM monthly    Oral B12 1000 micro g liquid Monday Wednesday and Friday    - CBC and differential; Future  - Vitamin B12; Future  - CBC and differential  - Vitamin B12      The patient is scheduled for follow-up in approximately 4 months  Patient voiced agreement and understanding to the above  Patient knows to call the Hematology/Oncology office with any questions and concerns regarding the above  Barrier(s) to care: None  The patient is able to self care   ------------------------------------------------------------------------------------------------------    Chief Complaint   Patient presents with    Follow-up     Iron deficiency, B12 deficiency       History of present illness: This is a 58-year-old female with history of heavy menstrual periods since a teenager who noted worsening issues with iron deficiency after donating blood in 2010  Patient recently underwent bariatric surgery in July 2020 and follow-up appointments demonstrated persistence of iron deficiency anemia and referral was made to Hematology      Patient is presently symptomatic with PICA-ice, on restful sleep, fatigue, hair loss, headaches and irritability  Patient notes that she is taking oral iron and is causing side effects such as constipation      Patient's iron studies prior to bariatric surgery demonstrate a pretty significant iron deficiency  Patient was never anemic and was asked to continue on oral iron supplementation  Since then, patient's symptoms have worsened      Patient also complains of heavy menstrual periods  Patient underwent evaluation with Gynecology  Patient had ultrasound completed that demonstrated a left ovarian cyst, likely benign without significant pathology within the gynecologic organs  Patient has only ever tried oral contraceptives to help lessen menstrual bleeding but this is not worked    Last time patient tried oral contraceptives was as a teenager  Patient is using a super plus tampon every hour during the heavy parts of her menstruation      EGD was completed in December 2019 without significant findings  Patient has never had a colonoscopy  Patient denies blood loss from her stool or her urine      2/21/20 WBC = 7 7, hemoglobin 12 5, MCV = 76, RDW 15 9, platelet count 944  3/26/74 ferritin = 6  8/12/20 WBC = 6 8, hemoglobin 12 1, hematocrit 30 2, MCV = 77, RDW 15 2, platelet count = 596  10/8/20 WBC = 4 4 hemoglobin = 11 2, MCV = 76, RDW 15 7, platelet count 698, ferritin = 7  12/4 & 12/18/2020: Feraheme x 2 doses  1/2021:  Maintenance Feraheme 510 IV q 28 days  1/5/2021:    WBC = 4 4, hemoglobin 11 8, MCV = 83, RDW = 17 4, platelet  = 470, ferritin =  91, iron saturation  = 27, B12 =  217  After maintenance infusions in February, patient was lost to follow-up  6/4/21 WBC = 5 5, hemoglobin 13 9, hematocrit 42 1, MCV 87, RDW 13 5, platelet count 537, O13= 283    Interval history:  Patient is very tired and run down  A birth control has improved heavy menstrual   Days from 12-7  Review of Systems   Constitutional: Positive for activity change and fatigue  Genitourinary: Positive for menstrual problem and vaginal bleeding  All other systems reviewed and are negative        Patient Active Problem List   Diagnosis    Peripheral edema    Left wrist tendonitis    Multiple benign nevi    Acute non-recurrent pansinusitis    Anxiety    Class 3 severe obesity without serious comorbidity with body mass index (BMI) of 40 0 to 44 9 in adult [facility])    Atypical chest pain    Influenza vaccine administered    PCOS (polycystic ovarian syndrome)    Drug reaction    BERENICE (obstructive sleep apnea)    Iron deficiency anemia secondary to inadequate dietary iron intake    Encounter for surgical aftercare following surgery of digestive system    Postsurgical malabsorption    Vitamin D deficiency    S/P gastric bypass    Class 1 obesity due to excess calories without serious comorbidity with body mass index (BMI) of 33 0 to 33 9 in adult    Iron deficiency    B12 deficiency    Pain of upper abdomen       Past Medical History:   Diagnosis Date    Allergic rhinitis     Anemia     ok currently    Anxiety     Asthma     activity induced    Benign neoplasm of skin of trunk     Morbid obesity with BMI of 40 0-44 9, adult (HCC)     Polycystic ovary syndrome     Postgastrectomy malabsorption     Skin tag     Sleep apnea     Tinea versicolor        Past Surgical History:   Procedure Laterality Date    CERVICAL CERCLAGE       SECTION  2017    PARAESOPHAGEAL HERNIA REPAIR N/A 2020    Procedure: REPAIR HERNIA PARAESOPHAGEAL  LAPAROSCOPIC;  Surgeon: Aydee Livingston MD;  Location: [address] [place] [address];  Service: Bariatrics    MD LAP GASTRIC BYPASS/JENNIFER-EN-Y N/A 2020    Procedure: BYPASS GASTRIC  JENNIFER-EN-Y LAPAROSCOPIC;  Surgeon: Aydee Livingston MD;  Location: [address] [place] [address];  Service: Bariatrics    TONSILLECTOMY      TUBAL LIGATION         Family History   Problem Relation Age of Onset    Diabetes Mother     Heart disease Mother         CHF    Hypertension Mother     Kidney cancer Mother         2020    Cancer Father         kidney    Aneurysm Father         AAA    COPD Father     Kidney disease Father     Kidney cancer Father             No Known Problems Sister     Cancer Maternal Grandmother         bladder    Cancer Paternal Grandfather         brain    Stroke Paternal Grandfather     No Known Problems Sister     No Known Problems Sister        Social History     Socioeconomic History    Marital status:      Spouse name: None    Number of children: None    Years of education: None    Highest education level: None   Occupational History    None   Tobacco Use    Smoking status: Never Smoker    Smokeless tobacco: Never Used   Vaping Use    Vaping Use: Never used   Substance and Sexual Activity    Alcohol use: Not Currently     Comment: rarely    Drug use: No    Sexual activity: Yes   Other Topics Concern    None   Social History Narrative    None     Social Determinants of Health     Financial Resource Strain:     Difficulty of Paying Living Expenses:    Food Insecurity:     Worried About Running Out of Food in the Last Year:     Ran Out of Food in the Last Year:    Transportation Needs:     Lack of Transportation (Medical):      Lack of Transportation (Non-Medical):    Physical Activity:     Days of Exercise per Week:     Minutes of Exercise per Session:    Stress:     Feeling of Stress :    Social Connections:     Frequency of Communication with Friends and Family:     Frequency of Social Gatherings with Friends and Family:     Attends Caodaism Services:     Active Member of Clubs or Organizations:     Attends Club or Organization Meetings:     Marital Status:    Intimate Partner Violence:     Fear of Current or Ex-Partner:     Emotionally Abused:     Physically Abused:     Sexually Abused:          Current Outpatient Medications:     acetaminophen (TYLENOL) 325 mg tablet, Take 650 mg by mouth every 6 (six) hours as needed for mild pain, Disp: , Rfl:     cyanocobalamin (VITAMIN B-12) 100 MCG tablet, Take 1,000 mcg by mouth daily , Disp: , Rfl:     furosemide (LASIX) 20 mg tablet, Take 20 mg by mouth as needed, Disp: , Rfl:     sucralfate (CARAFATE) 1 g tablet, Take 1 tablet (1 g total) by mouth 4 (four) times a day (Patient not taking: Reported on 6/17/2021), Disp: 120 tablet, Rfl: 0    Allergies   Allergen Reactions    Valtrex [Valacyclovir] Swelling    Amoxicillin      Tingling of mouth        Objective:  /80 (BP Location: Left arm, Patient Position: Sitting, Cuff Size: Adult)   Pulse 63   Temp 98 3 °F (36 8 °C)   Resp 18   Ht 5' 6" (1 676 m)   Wt 79 kg (174 lb 3 2 oz)   SpO2 100%   BMI 28 12 kg/m²    Physical Exam  Constitutional:       General: She is not in acute distress  Appearance: She is well-developed  HENT:      Head: Normocephalic and atraumatic  Eyes:      General: No scleral icterus  Pupils: Pupils are equal, round, and reactive to light  Cardiovascular:      Rate and Rhythm: Normal rate and regular rhythm  Heart sounds: No murmur heard  Pulmonary:      Effort: Pulmonary effort is normal  No respiratory distress  Skin:     General: Skin is warm  Coloration: Skin is not pale  Findings: No rash  Neurological:      Mental Status: She is alert and oriented to person, place, and time  Psychiatric:         Thought Content: Thought content normal          Result Review  Labs:  No visits with results within 3 Week(s) from this visit     Latest known visit with results is:   Office Visit on 02/22/2021   Component Date Value Ref Range Status    White Blood Cell Count 06/04/2021 5 5  3 4 - 10 8 x10E3/uL Final    Red Blood Cell Count 06/04/2021 4 83  3 77 - 5 28 x10E6/uL Final    Hemoglobin 06/04/2021 13 9  11 1 - 15 9 g/dL Final    HCT 06/04/2021 42 1  34 0 - 46 6 % Final    MCV 06/04/2021 87  79 - 97 fL Final    MCH 06/04/2021 28 8  26 6 - 33 0 pg Final    MCHC 06/04/2021 33 0  31 5 - 35 7 g/dL Final    RDW 06/04/2021 13 5  11 7 - 15 4 % Final    Platelet Count 80/57/2735 184  150 - 450 x10E3/uL Final    Neutrophils 06/04/2021 63  Not Estab  % Final    Lymphocytes 06/04/2021 27  Not Estab  % Final    Monocytes 06/04/2021 6  Not Estab  % Final    Eosinophils 06/04/2021 3  Not Estab  % Final    Basophils PCT 06/04/2021 1  Not Estab  % Final    Neutrophils (Absolute) 06/04/2021 3 5  1 4 - 7 0 x10E3/uL Final    Lymphocytes (Absolute) 06/04/2021 1 5  0 7 - 3 1 x10E3/uL Final    Monocytes (Absolute) 06/04/2021 0 4  0 1 - 0 9 x10E3/uL Final    Eosinophils (Absolute) 06/04/2021 0 1  0 0 - 0 4 x10E3/uL Final    Basophils ABS 06/04/2021 0 0  0 0 - 0 2 x10E3/uL Final    Immature Granulocytes 06/04/2021 0  Not Estab  % Final    Immature Granulocytes (Absolute) 06/04/2021 0 0  0 0 - 0 1 x10E3/uL Final    Vitamin B-12 06/04/2021 283  232 - 1,245 pg/mL Final       Please note: This report has been generated by a voice recognition software system  Therefore there may be syntax, spelling, and/or grammatical errors  Please call if you have any questions

## 2021-07-01 DIAGNOSIS — K91.2 POSTSURGICAL MALABSORPTION: ICD-10-CM

## 2021-07-01 DIAGNOSIS — E61.1 IRON DEFICIENCY: Primary | ICD-10-CM

## 2021-07-01 RX ORDER — SODIUM CHLORIDE 9 MG/ML
20 INJECTION, SOLUTION INTRAVENOUS ONCE
Status: CANCELLED | OUTPATIENT
Start: 2021-08-11

## 2021-07-13 ENCOUNTER — CONSULT (OUTPATIENT)
Dept: SURGERY | Facility: CLINIC | Age: 36
End: 2021-07-13

## 2021-07-13 VITALS
DIASTOLIC BLOOD PRESSURE: 60 MMHG | SYSTOLIC BLOOD PRESSURE: 112 MMHG | HEART RATE: 46 BPM | TEMPERATURE: 98.4 F | HEIGHT: 66 IN | WEIGHT: 171 LBS | BODY MASS INDEX: 27.48 KG/M2

## 2021-07-13 DIAGNOSIS — Z01.818 PREOPERATIVE EXAMINATION: ICD-10-CM

## 2021-07-13 DIAGNOSIS — K80.20 SYMPTOMATIC CHOLELITHIASIS: Primary | ICD-10-CM

## 2021-07-13 PROCEDURE — 99203 OFFICE O/P NEW LOW 30 MIN: CPT | Performed by: SURGERY

## 2021-07-13 PROCEDURE — 3008F BODY MASS INDEX DOCD: CPT | Performed by: SURGERY

## 2021-07-13 PROCEDURE — 1036F TOBACCO NON-USER: CPT | Performed by: SURGERY

## 2021-07-13 RX ORDER — LEVOFLOXACIN 5 MG/ML
500 INJECTION, SOLUTION INTRAVENOUS ONCE
Status: CANCELLED | OUTPATIENT
Start: 2021-08-04 | End: 2021-07-13

## 2021-07-13 NOTE — H&P
[facility] History and Physical Note:    Assessment:  Symptomatic cholelithiasis    Pertinent labs reviewed   I have reviewed the CMP from 5 January 2021  I reviewed the CBC from 4 June 2021  Pertinent images and available reads personally reviewed  I reviewed the  Gallbladder ultrasound images as well as the report  dated 13 March 2021  Pertinent notes reviewed   I reviewed the last hematology note by Dr Penny Lee dated 17 June 2021    Plan:   laparoscopic cholecystectomy  Patient understands that some or all of her abdominal complaints might persist postoperatively  Chief Complaint:  "I am here for my gallbladder"    HPI   patient is a pleasant 38-year-old woman who reports numerous episodes of right upper quadrant and right flank pain after eating  Pain lasts several hours or overnight and then gradually subsides  She denies any fevers, chills, nausea, vomiting or abnormal bowel movements recently  Ultrasound of the gallbladder is notable for:   BILIARY:  The gallbladder is normal in caliber  No wall thickening or pericholecystic fluid  Layering echogenic stones/gravel with acoustic shadowing  At least four echogenic polypoid lesions projecting into the gallbladder lumen measuring up to 5 mm  No sonographic Hunter's sign  No intrahepatic biliary dilatation  CBD measures 4 mm  No choledocholithiasis  Her past medical history is notable for a Teodoro-en-Y gastric prior brass with decrease in BMI from 44-28  She has chronic iron deficiency anemia as well as B 12 deficiency for which she is being treated          PMH:  Past Medical History:   Diagnosis Date    Allergic rhinitis     Anemia 2012    ok currently    Anxiety     Asthma     activity induced    Benign neoplasm of skin of trunk     Morbid obesity with BMI of 40 0-44 9, adult (HCC)     Polycystic ovary syndrome     Postgastrectomy malabsorption     Skin tag     Sleep apnea     Tinea versicolor PSH:  Past Surgical History:   Procedure Laterality Date    CERVICAL CERCLAGE       SECTION  2017    PARAESOPHAGEAL HERNIA REPAIR N/A 2020    Procedure: REPAIR HERNIA PARAESOPHAGEAL  LAPAROSCOPIC;  Surgeon: Shahida Dominguez MD;  Location: [address] [place] [address];  Service: Bariatrics    [place] LAP GASTRIC BYPASS/JENNIFER-EN-Y N/A 2020    Procedure: BYPASS GASTRIC  JENNIFER-EN-Y LAPAROSCOPIC;  Surgeon: Shahida Dominguez MD;  Location: [address] [place] [address];  Service: Bariatrics    TONSILLECTOMY      TUBAL LIGATION         Home Meds:  Current Outpatient Medications on File Prior to Visit   Medication Sig Dispense Refill    acetaminophen (TYLENOL) 325 mg tablet Take 650 mg by mouth every 6 (six) hours as needed for mild pain      cyanocobalamin (VITAMIN B-12) 100 MCG tablet Take 1,000 mcg by mouth daily       furosemide (LASIX) 20 mg tablet Take 20 mg by mouth as needed      sucralfate (CARAFATE) 1 g tablet Take 1 tablet (1 g total) by mouth 4 (four) times a day (Patient not taking: Reported on 2021) 120 tablet 0     No current facility-administered medications on file prior to visit  Allergies:   Allergies   Allergen Reactions    Valtrex [Valacyclovir] Swelling    Amoxicillin      Tingling of mouth        Social Hx:  Social History     Socioeconomic History    Marital status:      Spouse name: Not on file    Number of children: Not on file    Years of education: Not on file    Highest education level: Not on file   Occupational History    Not on file   Tobacco Use    Smoking status: Never Smoker    Smokeless tobacco: Never Used   Vaping Use    Vaping Use: Never used   Substance and Sexual Activity    Alcohol use: Not Currently     Comment: rarely    Drug use: No    Sexual activity: Yes   Other Topics Concern    Not on file   Social History Narrative    Not on file     Social Determinants of Health     Financial Resource Strain:     Difficulty of Paying Living Expenses: Food Insecurity:     Worried About Running Out of Food in the Last Year:     [address] in the Last Year:    Transportation Needs:     Lack of Transportation (Medical):  Lack of Transportation (Non-Medical):    Physical Activity:     Days of Exercise per Week:     Minutes of Exercise per Session:    Stress:     Feeling of Stress :    Social Connections:     Frequency of Communication with Friends and Family:     Frequency of Social Gatherings with Friends and Family:     Attends Faith Services:     Active Member of Clubs or Organizations:     Attends Club or Organization Meetings:     Marital Status:    Intimate Partner Violence:     Fear of Current or Ex-Partner:     Emotionally Abused:     Physically Abused:     Sexually Abused:         Family Hx:    Family History   Problem Relation Age of Onset    Diabetes Mother     Heart disease Mother         CHF    Hypertension Mother     Kidney cancer Mother         5/2020    Cancer Father         kidney    Aneurysm Father         AAA    COPD Father     Kidney disease Father     Kidney cancer Father         2010    No Known Problems Sister     Cancer Maternal Grandmother         bladder    Cancer Paternal Grandfather         brain    Stroke Paternal Grandfather     No Known Problems Sister     No Known Problems Sister        Review of Systems   Constitutional: Negative  HENT: Negative  Eyes: Negative  Respiratory: Negative  Cardiovascular: Negative  Gastrointestinal:          See HPI past medical history   Endocrine: Negative  Genitourinary: Negative  Musculoskeletal: Negative  Skin: Negative  Neurological: Negative      Hematological:         Iron deficiency anemia secondary to gastric bypass surgery       /60 (BP Location: Right arm, Patient Position: Sitting, Cuff Size: Standard)   Pulse (!) 46   Temp 98 4 °F (36 9 °C) (Core)   Ht 5' 6" (1 676 m)   Wt 77 6 kg (171 lb)   LMP 06/29/2021 BMI 27 60 kg/m²       Physical Exam  Vitals reviewed  Constitutional:       General: She is not in acute distress  Appearance: Normal appearance  HENT:      Head: Normocephalic and atraumatic  Mouth/Throat:      Pharynx: Oropharynx is clear  Eyes:      Pupils: Pupils are equal, round, and reactive to light  Cardiovascular:      Rate and Rhythm: Normal rate and regular rhythm  Heart sounds: No murmur heard  Pulmonary:      Effort: Pulmonary effort is normal  No respiratory distress  Breath sounds: Normal breath sounds  Abdominal:      General: Abdomen is flat  There is no distension  Palpations: Abdomen is soft  There is no mass  Tenderness: There is no abdominal tenderness  Musculoskeletal:         General: Normal range of motion  Cervical back: Normal range of motion and neck supple  Lymphadenopathy:      Cervical: No cervical adenopathy  Skin:     General: Skin is warm and dry  Capillary Refill: Capillary refill takes less than 2 seconds  Neurological:      General: No focal deficit present  Mental Status: She is alert and oriented to person, place, and time  Cranial Nerves: No cranial nerve deficit  Psychiatric:         Mood and Affect: Mood normal          Behavior: Behavior normal        Pertinent labs reviewed   I have reviewed the CMP from 5 January 2021      I reviewed the CBC from 4 June 2021  Pertinent images and available reads personally reviewed    Ireviewed the gallbladder ultrasound images as well as the report  dated 13 March 2021  Pertinent notes reviewed   I reviewed the last hematology note by Dr Rizvi Else dated 17 June 2021     Rena Cervantes MD [id number] [facility]  [phone]

## 2021-07-13 NOTE — PROGRESS NOTES
[facility] History and Physical Note:    Assessment:  Symptomatic cholelithiasis    Pertinent labs reviewed   I have reviewed the CMP from 5 January 2021  I reviewed the CBC from 4 June 2021  Pertinent images and available reads personally reviewed  I reviewed the  Gallbladder ultrasound images as well as the report  dated 13 March 2021  Pertinent notes reviewed   I reviewed the last hematology note by Dr Melodie Li dated 17 June 2021    Plan:   laparoscopic cholecystectomy  Patient understands that some or all of her abdominal complaints might persist postoperatively  Chief Complaint:  "I am here for my gallbladder"    HPI   patient is a pleasant 25-year-old woman who reports numerous episodes of right upper quadrant and right flank pain after eating  Pain lasts several hours or overnight and then gradually subsides  She denies any fevers, chills, nausea, vomiting or abnormal bowel movements recently  Ultrasound of the gallbladder is notable for:   BILIARY:  The gallbladder is normal in caliber  No wall thickening or pericholecystic fluid  Layering echogenic stones/gravel with acoustic shadowing  At least four echogenic polypoid lesions projecting into the gallbladder lumen measuring up to 5 mm  No sonographic Hunter's sign  No intrahepatic biliary dilatation  CBD measures 4 mm  No choledocholithiasis  Her past medical history is notable for a Teodoro-en-Y gastric prior brass with decrease in BMI from 44-28  She has chronic iron deficiency anemia as well as B 12 deficiency for which she is being treated          PMH:  Past Medical History:   Diagnosis Date    Allergic rhinitis     Anemia 2012    ok currently    Anxiety     Asthma     activity induced    Benign neoplasm of skin of trunk     Morbid obesity with BMI of 40 0-44 9, adult (HCC)     Polycystic ovary syndrome     Postgastrectomy malabsorption     Skin tag     Sleep apnea     Tinea versicolor PSH:  Past Surgical History:   Procedure Laterality Date    CERVICAL CERCLAGE       SECTION  2017    PARAESOPHAGEAL HERNIA REPAIR N/A 2020    Procedure: REPAIR HERNIA PARAESOPHAGEAL  LAPAROSCOPIC;  Surgeon: Angelique Mike MD;  Location: [address] [place] [address];  Service: Bariatrics    RI LAP GASTRIC BYPASS/JENNIFER-EN-Y N/A 2020    Procedure: BYPASS GASTRIC  JENNIFER-EN-Y LAPAROSCOPIC;  Surgeon: Angelique Mike MD;  Location: [address] [place] [address];  Service: Bariatrics    TONSILLECTOMY      TUBAL LIGATION         Home Meds:  Current Outpatient Medications on File Prior to Visit   Medication Sig Dispense Refill    acetaminophen (TYLENOL) 325 mg tablet Take 650 mg by mouth every 6 (six) hours as needed for mild pain      cyanocobalamin (VITAMIN B-12) 100 MCG tablet Take 1,000 mcg by mouth daily       furosemide (LASIX) 20 mg tablet Take 20 mg by mouth as needed      sucralfate (CARAFATE) 1 g tablet Take 1 tablet (1 g total) by mouth 4 (four) times a day (Patient not taking: Reported on 2021) 120 tablet 0     No current facility-administered medications on file prior to visit  Allergies:   Allergies   Allergen Reactions    Valtrex [Valacyclovir] Swelling    Amoxicillin      Tingling of mouth        Social Hx:  Social History     Socioeconomic History    Marital status:      Spouse name: Not on file    Number of children: Not on file    Years of education: Not on file    Highest education level: Not on file   Occupational History    Not on file   Tobacco Use    Smoking status: Never Smoker    Smokeless tobacco: Never Used   Vaping Use    Vaping Use: Never used   Substance and Sexual Activity    Alcohol use: Not Currently     Comment: rarely    Drug use: No    Sexual activity: Yes   Other Topics Concern    Not on file   Social History Narrative    Not on file     Social Determinants of Health     Financial Resource Strain:     Difficulty of Paying Living Expenses: Food Insecurity:     Worried About Running Out of Food in the Last Year:     [address] in the Last Year:    Transportation Needs:     Lack of Transportation (Medical):  Lack of Transportation (Non-Medical):    Physical Activity:     Days of Exercise per Week:     Minutes of Exercise per Session:    Stress:     Feeling of Stress :    Social Connections:     Frequency of Communication with Friends and Family:     Frequency of Social Gatherings with Friends and Family:     Attends Faith Services:     Active Member of Clubs or Organizations:     Attends Club or Organization Meetings:     Marital Status:    Intimate Partner Violence:     Fear of Current or Ex-Partner:     Emotionally Abused:     Physically Abused:     Sexually Abused:         Family Hx:    Family History   Problem Relation Age of Onset    Diabetes Mother     Heart disease Mother         CHF    Hypertension Mother     Kidney cancer Mother         5/2020    Cancer Father         kidney    Aneurysm Father         AAA    COPD Father     Kidney disease Father     Kidney cancer Father         2010    No Known Problems Sister     Cancer Maternal Grandmother         bladder    Cancer Paternal Grandfather         brain    Stroke Paternal Grandfather     No Known Problems Sister     No Known Problems Sister        Review of Systems   Constitutional: Negative  HENT: Negative  Eyes: Negative  Respiratory: Negative  Cardiovascular: Negative  Gastrointestinal:          See HPI past medical history   Endocrine: Negative  Genitourinary: Negative  Musculoskeletal: Negative  Skin: Negative  Neurological: Negative      Hematological:         Iron deficiency anemia secondary to gastric bypass surgery       /60 (BP Location: Right arm, Patient Position: Sitting, Cuff Size: Standard)   Pulse (!) 46   Temp 98 4 °F (36 9 °C) (Core)   Ht 5' 6" (1 676 m)   Wt 77 6 kg (171 lb)   LMP 06/29/2021 BMI 27 60 kg/m²       Physical Exam  Vitals reviewed  Constitutional:       General: She is not in acute distress  Appearance: Normal appearance  HENT:      Head: Normocephalic and atraumatic  Mouth/Throat:      Pharynx: Oropharynx is clear  Eyes:      Pupils: Pupils are equal, round, and reactive to light  Cardiovascular:      Rate and Rhythm: Normal rate and regular rhythm  Heart sounds: No murmur heard  Pulmonary:      Effort: Pulmonary effort is normal  No respiratory distress  Breath sounds: Normal breath sounds  Abdominal:      General: Abdomen is flat  There is no distension  Palpations: Abdomen is soft  There is no mass  Tenderness: There is no abdominal tenderness  Musculoskeletal:         General: Normal range of motion  Cervical back: Normal range of motion and neck supple  Lymphadenopathy:      Cervical: No cervical adenopathy  Skin:     General: Skin is warm and dry  Capillary Refill: Capillary refill takes less than 2 seconds  Neurological:      General: No focal deficit present  Mental Status: She is alert and oriented to person, place, and time  Cranial Nerves: No cranial nerve deficit  Psychiatric:         Mood and Affect: Mood normal          Behavior: Behavior normal        Pertinent labs reviewed   I have reviewed the CMP from 5 January 2021      I reviewed the CBC from 4 June 2021  Pertinent images and available reads personally reviewed    Ireviewed the gallbladder ultrasound images as well as the report  dated 13 March 2021  Pertinent notes reviewed   I reviewed the last hematology note by Dr Chin Alfaro dated 17 June 2021     Adri Elizalde MD [address] [facility]  [phone]

## 2021-07-13 NOTE — H&P (VIEW-ONLY)
[facility] History and Physical Note:    Assessment:  Symptomatic cholelithiasis    Pertinent labs reviewed   I have reviewed the CMP from 5 January 2021  I reviewed the CBC from 4 June 2021  Pertinent images and available reads personally reviewed  I reviewed the  Gallbladder ultrasound images as well as the report  dated 13 March 2021  Pertinent notes reviewed   I reviewed the last hematology note by Dr Maier Minus dated 17 June 2021    Plan:   laparoscopic cholecystectomy  Patient understands that some or all of her abdominal complaints might persist postoperatively  Chief Complaint:  "I am here for my gallbladder"    HPI   patient is a pleasant 58-year-old woman who reports numerous episodes of right upper quadrant and right flank pain after eating  Pain lasts several hours or overnight and then gradually subsides  She denies any fevers, chills, nausea, vomiting or abnormal bowel movements recently  Ultrasound of the gallbladder is notable for:   BILIARY:  The gallbladder is normal in caliber  No wall thickening or pericholecystic fluid  Layering echogenic stones/gravel with acoustic shadowing  At least four echogenic polypoid lesions projecting into the gallbladder lumen measuring up to 5 mm  No sonographic Hunter's sign  No intrahepatic biliary dilatation  CBD measures 4 mm  No choledocholithiasis  Her past medical history is notable for a Teodoro-en-Y gastric prior brass with decrease in BMI from 44-28  She has chronic iron deficiency anemia as well as B 12 deficiency for which she is being treated          PMH:  Past Medical History:   Diagnosis Date    Allergic rhinitis     Anemia 2012    ok currently    Anxiety     Asthma     activity induced    Benign neoplasm of skin of trunk     Morbid obesity with BMI of 40 0-44 9, adult (HCC)     Polycystic ovary syndrome     Postgastrectomy malabsorption     Skin tag     Sleep apnea     Tinea versicolor PSH:  Past Surgical History:   Procedure Laterality Date    CERVICAL CERCLAGE       SECTION  2017    PARAESOPHAGEAL HERNIA REPAIR N/A 2020    Procedure: REPAIR HERNIA PARAESOPHAGEAL  LAPAROSCOPIC;  Surgeon: Claude Jerry MD;  Location: [address] [place] [address];  Service: Bariatrics    NM LAP GASTRIC BYPASS/JENNIFER-EN-Y N/A 2020    Procedure: BYPASS GASTRIC  JENNIFER-EN-Y LAPAROSCOPIC;  Surgeon: Claude Jerry MD;  Location: [address] [place] [address];  Service: Bariatrics    TONSILLECTOMY      TUBAL LIGATION         Home Meds:  Current Outpatient Medications on File Prior to Visit   Medication Sig Dispense Refill    acetaminophen (TYLENOL) 325 mg tablet Take 650 mg by mouth every 6 (six) hours as needed for mild pain      cyanocobalamin (VITAMIN B-12) 100 MCG tablet Take 1,000 mcg by mouth daily       furosemide (LASIX) 20 mg tablet Take 20 mg by mouth as needed      sucralfate (CARAFATE) 1 g tablet Take 1 tablet (1 g total) by mouth 4 (four) times a day (Patient not taking: Reported on 2021) 120 tablet 0     No current facility-administered medications on file prior to visit  Allergies:   Allergies   Allergen Reactions    Valtrex [Valacyclovir] Swelling    Amoxicillin      Tingling of mouth        Social Hx:  Social History     Socioeconomic History    Marital status:      Spouse name: Not on file    Number of children: Not on file    Years of education: Not on file    Highest education level: Not on file   Occupational History    Not on file   Tobacco Use    Smoking status: Never Smoker    Smokeless tobacco: Never Used   Vaping Use    Vaping Use: Never used   Substance and Sexual Activity    Alcohol use: Not Currently     Comment: rarely    Drug use: No    Sexual activity: Yes   Other Topics Concern    Not on file   Social History Narrative    Not on file     Social Determinants of Health     Financial Resource Strain:     Difficulty of Paying Living Expenses: Food Insecurity:     Worried About Running Out of Food in the Last Year:     [address] in the Last Year:    Transportation Needs:     Lack of Transportation (Medical):  Lack of Transportation (Non-Medical):    Physical Activity:     Days of Exercise per Week:     Minutes of Exercise per Session:    Stress:     Feeling of Stress :    Social Connections:     Frequency of Communication with Friends and Family:     Frequency of Social Gatherings with Friends and Family:     Attends Restorationism Services:     Active Member of Clubs or Organizations:     Attends Club or Organization Meetings:     Marital Status:    Intimate Partner Violence:     Fear of Current or Ex-Partner:     Emotionally Abused:     Physically Abused:     Sexually Abused:         Family Hx:    Family History   Problem Relation Age of Onset    Diabetes Mother     Heart disease Mother         CHF    Hypertension Mother     Kidney cancer Mother         5/2020    Cancer Father         kidney    Aneurysm Father         AAA    COPD Father     Kidney disease Father     Kidney cancer Father         2010    No Known Problems Sister     Cancer Maternal Grandmother         bladder    Cancer Paternal Grandfather         brain    Stroke Paternal Grandfather     No Known Problems Sister     No Known Problems Sister        Review of Systems   Constitutional: Negative  HENT: Negative  Eyes: Negative  Respiratory: Negative  Cardiovascular: Negative  Gastrointestinal:          See HPI past medical history   Endocrine: Negative  Genitourinary: Negative  Musculoskeletal: Negative  Skin: Negative  Neurological: Negative      Hematological:         Iron deficiency anemia secondary to gastric bypass surgery       /60 (BP Location: Right arm, Patient Position: Sitting, Cuff Size: Standard)   Pulse (!) 46   Temp 98 4 °F (36 9 °C) (Core)   Ht 5' 6" (1 676 m)   Wt 77 6 kg (171 lb)   LMP 06/29/2021 BMI 27 60 kg/m²       Physical Exam  Vitals reviewed  Constitutional:       General: She is not in acute distress  Appearance: Normal appearance  HENT:      Head: Normocephalic and atraumatic  Mouth/Throat:      Pharynx: Oropharynx is clear  Eyes:      Pupils: Pupils are equal, round, and reactive to light  Cardiovascular:      Rate and Rhythm: Normal rate and regular rhythm  Heart sounds: No murmur heard  Pulmonary:      Effort: Pulmonary effort is normal  No respiratory distress  Breath sounds: Normal breath sounds  Abdominal:      General: Abdomen is flat  There is no distension  Palpations: Abdomen is soft  There is no mass  Tenderness: There is no abdominal tenderness  Musculoskeletal:         General: Normal range of motion  Cervical back: Normal range of motion and neck supple  Lymphadenopathy:      Cervical: No cervical adenopathy  Skin:     General: Skin is warm and dry  Capillary Refill: Capillary refill takes less than 2 seconds  Neurological:      General: No focal deficit present  Mental Status: She is alert and oriented to person, place, and time  Cranial Nerves: No cranial nerve deficit  Psychiatric:         Mood and Affect: Mood normal          Behavior: Behavior normal        Pertinent labs reviewed   I have reviewed the CMP from 5 January 2021      I reviewed the CBC from 4 June 2021  Pertinent images and available reads personally reviewed    Ireviewed the gallbladder ultrasound images as well as the report  dated 13 March 2021  Pertinent notes reviewed   I reviewed the last hematology note by Dr Patrick Stark dated 17 June 2021     Rodriguez Ramírez MD [address] [facility]  [phone]

## 2021-07-15 ENCOUNTER — TELEPHONE (OUTPATIENT)
Dept: HEMATOLOGY ONCOLOGY | Facility: MEDICAL CENTER | Age: 36
End: 2021-07-15

## 2021-07-15 NOTE — TELEPHONE ENCOUNTER
Spoke to patient and instructed her to have her iron panel completed so that we can scheduled her iron treatments  I informed patient that without an up to date iron panel result her insurance company will not approve the iron treatment plan  Patient stated she will be going to a [facility]'s lab to have her labs completed  I informed her that she will be called to get her treatment scheduled once we have the lab results, patient voiced understanding

## 2021-07-16 ENCOUNTER — HOSPITAL ENCOUNTER (OUTPATIENT)
Dept: INFUSION CENTER | Facility: HOSPITAL | Age: 36
Discharge: HOME | End: 2021-07-16
Attending: INTERNAL MEDICINE

## 2021-07-20 ENCOUNTER — APPOINTMENT (OUTPATIENT)
Dept: LAB | Facility: HOSPITAL | Age: 36
End: 2021-07-20
Attending: INTERNAL MEDICINE
Payer: COMMERCIAL

## 2021-07-20 DIAGNOSIS — E61.1 IRON DEFICIENCY: ICD-10-CM

## 2021-07-20 DIAGNOSIS — K91.2 POSTSURGICAL MALABSORPTION: ICD-10-CM

## 2021-07-20 LAB
BASOPHILS # BLD AUTO: 0.03 THOUSANDS/ΜL (ref 0–0.1)
BASOPHILS NFR BLD AUTO: 1 % (ref 0–1)
CHOLEST SERPL-MCNC: 141 MG/DL (ref 50–200)
EOSINOPHIL # BLD AUTO: 0.16 THOUSAND/ΜL (ref 0–0.61)
EOSINOPHIL NFR BLD AUTO: 3 % (ref 0–6)
ERYTHROCYTE [DISTWIDTH] IN BLOOD BY AUTOMATED COUNT: 13.1 % (ref 11.6–15.1)
FERRITIN SERPL-MCNC: 38 NG/ML (ref 8–388)
HCT VFR BLD AUTO: 39.4 % (ref 34.8–46.1)
HDLC SERPL-MCNC: 63 MG/DL
HGB BLD-MCNC: 12.5 G/DL (ref 11.5–15.4)
IMM GRANULOCYTES # BLD AUTO: 0.01 THOUSAND/UL (ref 0–0.2)
IMM GRANULOCYTES NFR BLD AUTO: 0 % (ref 0–2)
IRON SATN MFR SERPL: 34 %
IRON SERPL-MCNC: 83 UG/DL (ref 50–170)
LDLC SERPL CALC-MCNC: 66 MG/DL (ref 0–100)
LYMPHOCYTES # BLD AUTO: 1.47 THOUSANDS/ΜL (ref 0.6–4.47)
LYMPHOCYTES NFR BLD AUTO: 31 % (ref 14–44)
MCH RBC QN AUTO: 28.3 PG (ref 26.8–34.3)
MCHC RBC AUTO-ENTMCNC: 31.7 G/DL (ref 31.4–37.4)
MCV RBC AUTO: 89 FL (ref 82–98)
MONOCYTES # BLD AUTO: 0.32 THOUSAND/ΜL (ref 0.17–1.22)
MONOCYTES NFR BLD AUTO: 7 % (ref 4–12)
NEUTROPHILS # BLD AUTO: 2.74 THOUSANDS/ΜL (ref 1.85–7.62)
NEUTS SEG NFR BLD AUTO: 58 % (ref 43–75)
NONHDLC SERPL-MCNC: 78 MG/DL
NRBC BLD AUTO-RTO: 0 /100 WBCS
PLATELET # BLD AUTO: 181 THOUSANDS/UL (ref 149–390)
PMV BLD AUTO: 12 FL (ref 8.9–12.7)
RBC # BLD AUTO: 4.42 MILLION/UL (ref 3.81–5.12)
TIBC SERPL-MCNC: 244 UG/DL (ref 250–450)
TRIGL SERPL-MCNC: 58 MG/DL
WBC # BLD AUTO: 4.73 THOUSAND/UL (ref 4.31–10.16)

## 2021-07-20 PROCEDURE — 82728 ASSAY OF FERRITIN: CPT

## 2021-07-20 PROCEDURE — 85025 COMPLETE CBC W/AUTO DIFF WBC: CPT

## 2021-07-20 PROCEDURE — 36415 COLL VENOUS BLD VENIPUNCTURE: CPT

## 2021-07-20 PROCEDURE — 83540 ASSAY OF IRON: CPT

## 2021-07-20 PROCEDURE — 83550 IRON BINDING TEST: CPT

## 2021-07-20 PROCEDURE — 83918 ORGANIC ACIDS TOTAL QUANT: CPT

## 2021-07-20 PROCEDURE — 80061 LIPID PANEL: CPT

## 2021-07-22 ENCOUNTER — OFFICE VISIT (OUTPATIENT)
Dept: BARIATRICS | Facility: CLINIC | Age: 36
End: 2021-07-22
Payer: COMMERCIAL

## 2021-07-22 VITALS
HEIGHT: 66 IN | WEIGHT: 168.6 LBS | SYSTOLIC BLOOD PRESSURE: 100 MMHG | HEART RATE: 54 BPM | DIASTOLIC BLOOD PRESSURE: 70 MMHG | BODY MASS INDEX: 27.1 KG/M2

## 2021-07-22 DIAGNOSIS — Z48.815 ENCOUNTER FOR SURGICAL AFTERCARE FOLLOWING SURGERY ON THE DIGESTIVE SYSTEM: Primary | ICD-10-CM

## 2021-07-22 DIAGNOSIS — E61.1 IRON DEFICIENCY: ICD-10-CM

## 2021-07-22 DIAGNOSIS — G47.33 OSA (OBSTRUCTIVE SLEEP APNEA): ICD-10-CM

## 2021-07-22 DIAGNOSIS — K91.2 POSTSURGICAL MALABSORPTION: ICD-10-CM

## 2021-07-22 DIAGNOSIS — Z98.84 S/P GASTRIC BYPASS: ICD-10-CM

## 2021-07-22 PROCEDURE — 99213 OFFICE O/P EST LOW 20 MIN: CPT | Performed by: SURGERY

## 2021-07-22 PROCEDURE — 1036F TOBACCO NON-USER: CPT | Performed by: SURGERY

## 2021-07-22 PROCEDURE — 3008F BODY MASS INDEX DOCD: CPT | Performed by: SURGERY

## 2021-07-22 RX ORDER — MULTIVITAMIN
1 TABLET ORAL DAILY
COMMUNITY

## 2021-07-22 NOTE — PROGRESS NOTES
Progress Note - Bariatric Surgery   Cale Mistry 43 y o  female MRN: [id number]  Unit/Bed#:  Encounter: [id number]    Assessment/Plan:  36/F s/p LRYGB with excellent weight loss results at 1 year    Mindful eating, stress reduction sleep hygiene  MVI/minerals  Exercise   Obtain metabolic panel and RTO in 1 year     Subjective/Objective     Subjective: Patient is doing well  She is happy with her weight loss  +Exercise  +MVI/minerals  Not consistent with calcium  Adequate protein/H20  Denies NSAIDs  Review of systems: negative except as noted above     Objective:     Blood pressure 100/70, pulse (!) 54, height 5' 6" (1 676 m), weight 76 5 kg (168 lb 9 6 oz), last menstrual period 06/29/2021, not currently breastfeeding  ,Body mass index is 27 21 kg/m²  Invasive Devices     None                 Physical Exam:    No distress   EOMI   CN II-XII grossly intact  Neck ROM wnl   RRR  Breathing non labored   Abd flat, ND; wounds healed well  Skin warm/dry  Msk ROM wnl   Thought content/behavior/mood wnl               Lab, Imaging and other studies:I have personally reviewed pertinent lab results

## 2021-07-23 LAB
METHYLMALONATE SERPL-SCNC: 163 NMOL/L (ref 0–378)
SL AMB DISCLAIMER: NORMAL

## 2021-07-27 ENCOUNTER — TELEPHONE (OUTPATIENT)
Dept: HEMATOLOGY ONCOLOGY | Facility: CLINIC | Age: 36
End: 2021-07-27

## 2021-07-27 NOTE — TELEPHONE ENCOUNTER
Patient advised of 7/20/21 lab results  Patient stated that she is experiencing daily headaches  Patient stated that she has had a headache for 2 weeks straight  Severe fatigue  Patient is requesting IV Iron  Will forward to Rey LOVE to review with MARIE SOLORIO (Self) [phone] (H)

## 2021-07-27 NOTE — PRE-PROCEDURE INSTRUCTIONS
My Surgical Experience    The following information was developed to assist you to prepare for your operation  What do I need to do before coming to the hospital?   Arrange for a responsible person to drive you to and from the hospital    Arrange care for your children at home  Children are not allowed in the recovery areas of the hospital   Plan to wear clothing that is easy to put on and take off  If you are having shoulder surgery, wear a shirt that buttons or zippers in the front  Bathing  o Shower the evening before and the morning of your surgery with an antibacterial soap  Please refer to the Pre Op Showering Instructions for Surgery Patients Sheet   o Remove nail polish and all body piercing jewelry  o Do not shave any body part for at least 24 hours before surgery-this includes face, arms, legs and upper body  Food  o Nothing to eat or drink after midnight the night before your surgery  This includes candy and chewing gum  o Exception: If your surgery is after 12:00pm (noon), you may have clear liquids such as 7-Up®, ginger ale, apple or cranberry juice, Jell-O®, water, or clear broth until 8:00 am  o Do not drink milk or juice with pulp on the morning before surgery  o Do not drink alcohol 24 hours before surgery  Medicine  o Follow instructions you received from your surgeon about which medicines you may take on the day of surgery  o If instructed to take medicine on the morning of surgery, take pills with just a small sip of water  Call your prescribing doctor for specific infroamtion on what to do if you take insulin    What should I bring to the hospital?    Bring:  Penvladimir Olp or a walker, if you have them, for foot or knee surgery   A list of the daily medicines, vitamins, minerals, herbals and nutritional supplements you take   Include the dosages of medicines and the time you take them each day   Glasses, dentures or hearing aids   Minimal clothing; you will be wearing hospital sleepwear   Photo ID; required to verify your identity   If you have a Living Will or Power of , bring a copy of the documents   If you have an ostomy, bring an extra pouch and any supplies you use    Do not bring   Medicines or inhalers   Money, valuables or jewelry    What other information should I know about the day of surgery?  Notify your surgeons if you develop a cold, sore throat, cough, fever, rash or any other illness   Report to the Ambulatory Surgical/Same Day Surgery Unit   You will be instructed to stop at Registration only if you have not been pre-registered   Inform your  fi they do not stay that they will be asked by the staff to leave a phone number where they can be reached   Be available to be reached before surgery  In the event the operating room schedule changes, you may be asked to come in earlier or later than expected    *It is important to tell your doctor and others involved in your health care if you are taking or have been taking any non-prescription drugs, vitamins, minerals, herbals or other nutritional supplements  Any of these may interact with some food or medicines and cause a reaction      Pre-Surgery Instructions:   Medication Instructions    acetaminophen (TYLENOL) 325 mg tablet Instructed patient per Anesthesia Guidelines   cyanocobalamin (VITAMIN B-12) 100 MCG tablet Instructed patient per Anesthesia Guidelines   furosemide (LASIX) 20 mg tablet Instructed patient per Anesthesia Guidelines   Multiple Vitamin (multivitamin) tablet Instructed patient per Anesthesia Guidelines

## 2021-07-28 ENCOUNTER — TELEPHONE (OUTPATIENT)
Dept: HEMATOLOGY ONCOLOGY | Facility: CLINIC | Age: 36
End: 2021-07-28

## 2021-07-28 DIAGNOSIS — Z98.84 S/P GASTRIC BYPASS: ICD-10-CM

## 2021-07-28 DIAGNOSIS — E53.8 B12 DEFICIENCY: Primary | ICD-10-CM

## 2021-07-28 RX ORDER — CYANOCOBALAMIN 1000 UG/ML
1000 INJECTION INTRAMUSCULAR; SUBCUTANEOUS ONCE
Status: CANCELLED | OUTPATIENT
Start: 2021-08-04

## 2021-07-28 NOTE — TELEPHONE ENCOUNTER
Patient is calling in to inform that the infusion she is scheduled for on 08/04 she is unable to make this appointment due to getting surgery on the same day, she can be reached back at [phone] to david

## 2021-07-29 PROCEDURE — U0003 INFECTIOUS AGENT DETECTION BY NUCLEIC ACID (DNA OR RNA); SEVERE ACUTE RESPIRATORY SYNDROME CORONAVIRUS 2 (SARS-COV-2) (CORONAVIRUS DISEASE [COVID-19]), AMPLIFIED PROBE TECHNIQUE, MAKING USE OF HIGH THROUGHPUT TECHNOLOGIES AS DESCRIBED BY CMS-2020-01-R: HCPCS | Performed by: SURGERY

## 2021-07-29 PROCEDURE — U0005 INFEC AGEN DETEC AMPLI PROBE: HCPCS | Performed by: SURGERY

## 2021-07-30 ENCOUNTER — ANESTHESIA EVENT (OUTPATIENT)
Dept: PERIOP | Facility: HOSPITAL | Age: 36
End: 2021-07-30
Payer: COMMERCIAL

## 2021-07-30 PROBLEM — J45.909 ASTHMA: Status: ACTIVE | Noted: 2021-07-30

## 2021-07-30 PROBLEM — J45.990 EXERCISE-INDUCED ASTHMA: Status: ACTIVE | Noted: 2021-07-30

## 2021-07-30 PROBLEM — Z98.51 S/P TUBAL LIGATION: Status: ACTIVE | Noted: 2021-07-30

## 2021-07-30 NOTE — ANESTHESIA PREPROCEDURE EVALUATION
Procedure:  CHOLECYSTECTOMY LAPAROSCOPIC (N/A Abdomen)    Relevant Problems   ANESTHESIA   (+) PONV (postoperative nausea and vomiting)      CARDIO   (+) Atypical chest pain      HEMATOLOGY   (+) Iron deficiency anemia secondary to inadequate dietary iron intake      NEURO/PSYCH   (+) Anxiety (Resolved)      PULMONARY   (+) Exercise-induced asthma   (+) BERENICE (obstructive sleep apnea) (Resolved)      Other   (+) PCOS (polycystic ovarian syndrome)   (+) S/P gastric bypass   (+) S/P tubal ligation        Physical Exam    Airway    Mallampati score: II  TM Distance: >3 FB  Neck ROM: full     Dental       Cardiovascular  Rhythm: regular, Rate: normal,     Pulmonary  Breath sounds clear to auscultation,     Other Findings        Anesthesia Plan  ASA Score- 2     Anesthesia Type- general with ASA Monitors  Additional Monitors:   Airway Plan: ETT  Plan Factors-    Chart reviewed  Patient is not a current smoker  Induction- intravenous  Postoperative Plan- Plan for postoperative opioid use  Informed Consent- Anesthetic plan and risks discussed with patient  I personally reviewed this patient with the CRNA  Discussed and agreed on the Anesthesia Plan with the CRNA  Parris Miller

## 2021-08-04 ENCOUNTER — HOSPITAL ENCOUNTER (OUTPATIENT)
Dept: INFUSION CENTER | Facility: HOSPITAL | Age: 36
Discharge: HOME | End: 2021-08-04
Attending: INTERNAL MEDICINE

## 2021-08-04 ENCOUNTER — ANESTHESIA (OUTPATIENT)
Dept: PERIOP | Facility: HOSPITAL | Age: 36
End: 2021-08-04
Payer: COMMERCIAL

## 2021-08-04 ENCOUNTER — HOSPITAL ENCOUNTER (OUTPATIENT)
Facility: HOSPITAL | Age: 36
Setting detail: OUTPATIENT SURGERY
Discharge: HOME | End: 2021-08-04
Attending: SURGERY | Admitting: SURGERY
Payer: COMMERCIAL

## 2021-08-04 VITALS
TEMPERATURE: 97.7 F | HEART RATE: 50 BPM | WEIGHT: 171.4 LBS | HEIGHT: 66 IN | BODY MASS INDEX: 27.55 KG/M2 | OXYGEN SATURATION: 99 % | RESPIRATION RATE: 18 BRPM | SYSTOLIC BLOOD PRESSURE: 139 MMHG | DIASTOLIC BLOOD PRESSURE: 72 MMHG

## 2021-08-04 DIAGNOSIS — K80.20 SYMPTOMATIC CHOLELITHIASIS: ICD-10-CM

## 2021-08-04 DIAGNOSIS — G89.18 POSTOPERATIVE PAIN: Primary | ICD-10-CM

## 2021-08-04 PROBLEM — Z98.890 PONV (POSTOPERATIVE NAUSEA AND VOMITING): Status: ACTIVE | Noted: 2021-08-04

## 2021-08-04 PROBLEM — F41.9 ANXIETY: Status: RESOLVED | Noted: 2019-03-06 | Resolved: 2021-08-04

## 2021-08-04 PROBLEM — R11.2 PONV (POSTOPERATIVE NAUSEA AND VOMITING): Status: ACTIVE | Noted: 2021-08-04

## 2021-08-04 PROCEDURE — 88304 TISSUE EXAM BY PATHOLOGIST: CPT | Performed by: PATHOLOGY

## 2021-08-04 PROCEDURE — 47562 LAPAROSCOPIC CHOLECYSTECTOMY: CPT | Performed by: SURGERY

## 2021-08-04 RX ORDER — MEPERIDINE HYDROCHLORIDE 25 MG/ML
12.5 INJECTION INTRAMUSCULAR; INTRAVENOUS; SUBCUTANEOUS
Status: DISCONTINUED | OUTPATIENT
Start: 2021-08-04 | End: 2021-08-04 | Stop reason: HOSPADM

## 2021-08-04 RX ORDER — PROPOFOL 10 MG/ML
INJECTION, EMULSION INTRAVENOUS CONTINUOUS PRN
Status: DISCONTINUED | OUTPATIENT
Start: 2021-08-04 | End: 2021-08-04

## 2021-08-04 RX ORDER — ONDANSETRON 2 MG/ML
4 INJECTION INTRAMUSCULAR; INTRAVENOUS ONCE AS NEEDED
Status: DISCONTINUED | OUTPATIENT
Start: 2021-08-04 | End: 2021-08-04 | Stop reason: HOSPADM

## 2021-08-04 RX ORDER — ROCURONIUM BROMIDE 10 MG/ML
INJECTION, SOLUTION INTRAVENOUS AS NEEDED
Status: DISCONTINUED | OUTPATIENT
Start: 2021-08-04 | End: 2021-08-04

## 2021-08-04 RX ORDER — GLYCOPYRROLATE 0.2 MG/ML
INJECTION INTRAMUSCULAR; INTRAVENOUS AS NEEDED
Status: DISCONTINUED | OUTPATIENT
Start: 2021-08-04 | End: 2021-08-04

## 2021-08-04 RX ORDER — FENTANYL CITRATE/PF 50 MCG/ML
50 SYRINGE (ML) INJECTION
Status: DISCONTINUED | OUTPATIENT
Start: 2021-08-04 | End: 2021-08-04 | Stop reason: HOSPADM

## 2021-08-04 RX ORDER — SCOLOPAMINE TRANSDERMAL SYSTEM 1 MG/1
1 PATCH, EXTENDED RELEASE TRANSDERMAL ONCE
Status: DISCONTINUED | OUTPATIENT
Start: 2021-08-04 | End: 2021-08-04 | Stop reason: HOSPADM

## 2021-08-04 RX ORDER — DEXAMETHASONE SODIUM PHOSPHATE 10 MG/ML
INJECTION, SOLUTION INTRAMUSCULAR; INTRAVENOUS AS NEEDED
Status: DISCONTINUED | OUTPATIENT
Start: 2021-08-04 | End: 2021-08-04

## 2021-08-04 RX ORDER — MIDAZOLAM HYDROCHLORIDE 2 MG/2ML
INJECTION, SOLUTION INTRAMUSCULAR; INTRAVENOUS AS NEEDED
Status: DISCONTINUED | OUTPATIENT
Start: 2021-08-04 | End: 2021-08-04

## 2021-08-04 RX ORDER — OXYCODONE HYDROCHLORIDE AND ACETAMINOPHEN 5; 325 MG/1; MG/1
1 TABLET ORAL EVERY 4 HOURS PRN
Qty: 6 TABLET | Refills: 0 | Status: SHIPPED | OUTPATIENT
Start: 2021-08-04 | End: 2022-03-17

## 2021-08-04 RX ORDER — BUPIVACAINE HYDROCHLORIDE AND EPINEPHRINE 5; 5 MG/ML; UG/ML
INJECTION, SOLUTION EPIDURAL; INTRACAUDAL; PERINEURAL AS NEEDED
Status: DISCONTINUED | OUTPATIENT
Start: 2021-08-04 | End: 2021-08-04 | Stop reason: HOSPADM

## 2021-08-04 RX ORDER — FENTANYL CITRATE 50 UG/ML
INJECTION, SOLUTION INTRAMUSCULAR; INTRAVENOUS AS NEEDED
Status: DISCONTINUED | OUTPATIENT
Start: 2021-08-04 | End: 2021-08-04

## 2021-08-04 RX ORDER — ONDANSETRON 2 MG/ML
INJECTION INTRAMUSCULAR; INTRAVENOUS AS NEEDED
Status: DISCONTINUED | OUTPATIENT
Start: 2021-08-04 | End: 2021-08-04

## 2021-08-04 RX ORDER — LEVOFLOXACIN 5 MG/ML
500 INJECTION, SOLUTION INTRAVENOUS ONCE
Status: COMPLETED | OUTPATIENT
Start: 2021-08-04 | End: 2021-08-04

## 2021-08-04 RX ORDER — SODIUM CHLORIDE, SODIUM LACTATE, POTASSIUM CHLORIDE, CALCIUM CHLORIDE 600; 310; 30; 20 MG/100ML; MG/100ML; MG/100ML; MG/100ML
75 INJECTION, SOLUTION INTRAVENOUS CONTINUOUS
Status: DISCONTINUED | OUTPATIENT
Start: 2021-08-04 | End: 2021-08-04 | Stop reason: HOSPADM

## 2021-08-04 RX ORDER — MAGNESIUM HYDROXIDE 1200 MG/15ML
LIQUID ORAL AS NEEDED
Status: DISCONTINUED | OUTPATIENT
Start: 2021-08-04 | End: 2021-08-04 | Stop reason: HOSPADM

## 2021-08-04 RX ORDER — HYDROMORPHONE HCL/PF 1 MG/ML
0.5 SYRINGE (ML) INJECTION
Status: DISCONTINUED | OUTPATIENT
Start: 2021-08-04 | End: 2021-08-04 | Stop reason: HOSPADM

## 2021-08-04 RX ORDER — NEOSTIGMINE METHYLSULFATE 1 MG/ML
INJECTION INTRAVENOUS AS NEEDED
Status: DISCONTINUED | OUTPATIENT
Start: 2021-08-04 | End: 2021-08-04

## 2021-08-04 RX ORDER — PROPOFOL 10 MG/ML
INJECTION, EMULSION INTRAVENOUS AS NEEDED
Status: DISCONTINUED | OUTPATIENT
Start: 2021-08-04 | End: 2021-08-04

## 2021-08-04 RX ORDER — LIDOCAINE HYDROCHLORIDE 10 MG/ML
INJECTION, SOLUTION EPIDURAL; INFILTRATION; INTRACAUDAL; PERINEURAL AS NEEDED
Status: DISCONTINUED | OUTPATIENT
Start: 2021-08-04 | End: 2021-08-04

## 2021-08-04 RX ORDER — PROMETHAZINE HYDROCHLORIDE 25 MG/ML
12.5 INJECTION, SOLUTION INTRAMUSCULAR; INTRAVENOUS ONCE AS NEEDED
Status: DISCONTINUED | OUTPATIENT
Start: 2021-08-04 | End: 2021-08-04 | Stop reason: HOSPADM

## 2021-08-04 RX ORDER — FENTANYL CITRATE/PF 50 MCG/ML
25 SYRINGE (ML) INJECTION
Status: DISCONTINUED | OUTPATIENT
Start: 2021-08-04 | End: 2021-08-04 | Stop reason: HOSPADM

## 2021-08-04 RX ADMIN — SCOPALAMINE 1 PATCH: 1 PATCH, EXTENDED RELEASE TRANSDERMAL at 08:28

## 2021-08-04 RX ADMIN — ONDANSETRON 4 MG: 2 INJECTION INTRAMUSCULAR; INTRAVENOUS at 09:33

## 2021-08-04 RX ADMIN — LIDOCAINE HYDROCHLORIDE 50 MG: 10 INJECTION, SOLUTION EPIDURAL; INFILTRATION; INTRACAUDAL; PERINEURAL at 09:26

## 2021-08-04 RX ADMIN — FENTANYL CITRATE 50 MCG: 50 INJECTION, SOLUTION INTRAMUSCULAR; INTRAVENOUS at 10:51

## 2021-08-04 RX ADMIN — ROCURONIUM BROMIDE 50 MG: 10 INJECTION, SOLUTION INTRAVENOUS at 09:27

## 2021-08-04 RX ADMIN — PROPOFOL 200 MG: 10 INJECTION, EMULSION INTRAVENOUS at 09:26

## 2021-08-04 RX ADMIN — NEOSTIGMINE METHYLSULFATE 4 MG: 1 INJECTION INTRAVENOUS at 10:09

## 2021-08-04 RX ADMIN — SODIUM CHLORIDE, SODIUM LACTATE, POTASSIUM CHLORIDE, AND CALCIUM CHLORIDE 75 ML/HR: .6; .31; .03; .02 INJECTION, SOLUTION INTRAVENOUS at 07:53

## 2021-08-04 RX ADMIN — LEVOFLOXACIN: 5 INJECTION, SOLUTION INTRAVENOUS at 09:32

## 2021-08-04 RX ADMIN — GLYCOPYRROLATE 0.2 MG: 0.2 INJECTION, SOLUTION INTRAMUSCULAR; INTRAVENOUS at 09:26

## 2021-08-04 RX ADMIN — FENTANYL CITRATE 50 MCG: 50 INJECTION, SOLUTION INTRAMUSCULAR; INTRAVENOUS at 09:26

## 2021-08-04 RX ADMIN — PROPOFOL 130 MCG/KG/MIN: 10 INJECTION, EMULSION INTRAVENOUS at 09:41

## 2021-08-04 RX ADMIN — GLYCOPYRROLATE 0.6 MG: 0.2 INJECTION, SOLUTION INTRAMUSCULAR; INTRAVENOUS at 10:09

## 2021-08-04 RX ADMIN — FENTANYL CITRATE 50 MCG: 50 INJECTION, SOLUTION INTRAMUSCULAR; INTRAVENOUS at 09:41

## 2021-08-04 RX ADMIN — DEXAMETHASONE SODIUM PHOSPHATE 4 MG: 10 INJECTION, SOLUTION INTRAMUSCULAR; INTRAVENOUS at 09:33

## 2021-08-04 RX ADMIN — FENTANYL CITRATE 50 MCG: 50 INJECTION, SOLUTION INTRAMUSCULAR; INTRAVENOUS at 09:53

## 2021-08-04 RX ADMIN — MIDAZOLAM 2 MG: 1 INJECTION INTRAMUSCULAR; INTRAVENOUS at 09:19

## 2021-08-04 NOTE — DISCHARGE INSTR - AVS FIRST PAGE
1  Take Tylenol, 2 pills, 3 times a day regularly  2  Take Percocet, in addition to the Tylenol, if you need further pain control  3  Keep the incisions clean and dry for 2 days  After 2 days, they may get wet in the shower  No dressings are required    4  If you have any questions before your follow-up appointment, call my office at [phone]

## 2021-08-04 NOTE — OP NOTE
OPERATIVE REPORT  PATIENT NAME: Frannie Maria    :  1985  MRN: [id number]  Pt Location: [facility] OR ROOM 01    SURGERY DATE: 2021    Surgeon(s) and Role:     * Karlo Gonzalez MD - Primary    Preop Diagnosis:  Symptomatic cholelithiasis [K80 20]    Post-Op Diagnosis Codes: * Symptomatic cholelithiasis [K80 20]    Procedure(s) (LRB):  CHOLECYSTECTOMY LAPAROSCOPIC (N/A)    Specimen(s):  ID Type Source Tests Collected by Time Destination   1 :  Tissue Gallbladder TISSUE EXAM Karlo Gonzalez MD 2021 [id number]        Estimated Blood Loss:   Minimal    Drains:  * No LDAs found *    Anesthesia Type:   General    Operative Indications:  Symptomatic cholelithiasis [K80 20]      Operative Findings:  Normal appearing gallbladder with stones  Complications:   None    Procedure and Technique:  Laparoscopic cholecystectomy  Patient was taken back to main operating room, placed supine on the operating table, general anesthesia was induced, and the abdomen was prepped and draped in normal fashion  Utilizing the Veress needle at the umbilicus, a pneumoperitoneum was created to 15 mmHg and maintained at this level throughout the case  An 11 mm trocar was placed at the umbilicus  Three additional 5 mm trocars were placed in the upper abdomen  Utilizing standard laparoscopic technique a distended and non inflamed gallbladder was clearly identified  Retraction and dissection around the infundibulum revealed the cystic duct and cystic artery  After the critical view was obtained, these structures were clipped and divided  The gallbladder was removed from the gallbladder bed fossa with Bovie cautery, placed in the Endo-Catch bag, and removed from the umbilical port  The fascial opening at the umbilicus was closed with 0 Vicryl suture  Four 0 Monocryl was used to approximate the skin edges  Exofin was placed as a dressing      The patient awoke from general anesthesia, was extubated in the operating room, and sent to the PACU in stable condition      Dr Galo Mesa, a 2nd year General surgery resident, was required for technical assistance and retraction   I was present for the entire procedure    Patient Disposition:  PACU     SIGNATURE: Yulissa Viveros MD  DATE: August 4, 2021  TIME: 10:10 AM

## 2021-08-04 NOTE — INTERVAL H&P NOTE
H&P reviewed  After examining the patient I find no changes in the patients condition since the H&P had been written      Vitals:    08/04/21 0736   BP: 117/60   Pulse: (!) 54   Resp: 18   Temp: 99 °F (37 2 °C)   SpO2: 98%

## 2021-08-04 NOTE — ANESTHESIA POSTPROCEDURE EVALUATION
Post-Op Assessment Note    CV Status:  Stable  Pain Score: 0    Pain management: adequate     Mental Status:  Awake   Hydration Status:  Stable   PONV Controlled:  None   Airway Patency:  Patent      Post Op Vitals Reviewed: Yes      Staff: CRNA   Comments: Spontaneously breathing, HOB @ 30 degrees, vss, simple mask to O2, fully endorsed to recovery w/o AC        No complications documented      BP   132/64   Temp      Pulse 72   Resp   15   SpO2   98   /

## 2021-08-11 ENCOUNTER — HOSPITAL ENCOUNTER (OUTPATIENT)
Dept: INFUSION CENTER | Facility: HOSPITAL | Age: 36
Discharge: HOME | End: 2021-08-11
Attending: INTERNAL MEDICINE
Payer: COMMERCIAL

## 2021-08-11 DIAGNOSIS — E61.1 IRON DEFICIENCY: Primary | ICD-10-CM

## 2021-08-11 DIAGNOSIS — E53.8 B12 DEFICIENCY: ICD-10-CM

## 2021-08-11 DIAGNOSIS — K91.2 POSTSURGICAL MALABSORPTION: ICD-10-CM

## 2021-08-11 DIAGNOSIS — Z98.84 S/P GASTRIC BYPASS: ICD-10-CM

## 2021-08-11 PROCEDURE — 96365 THER/PROPH/DIAG IV INF INIT: CPT

## 2021-08-11 PROCEDURE — 96372 THER/PROPH/DIAG INJ SC/IM: CPT

## 2021-08-11 RX ORDER — CYANOCOBALAMIN 1000 UG/ML
1000 INJECTION INTRAMUSCULAR; SUBCUTANEOUS ONCE
Status: COMPLETED | OUTPATIENT
Start: 2021-08-11 | End: 2021-08-11

## 2021-08-11 RX ORDER — CYANOCOBALAMIN 1000 UG/ML
1000 INJECTION INTRAMUSCULAR; SUBCUTANEOUS ONCE
Status: CANCELLED | OUTPATIENT
Start: 2021-08-11

## 2021-08-11 RX ORDER — SODIUM CHLORIDE 9 MG/ML
20 INJECTION, SOLUTION INTRAVENOUS ONCE
Status: COMPLETED | OUTPATIENT
Start: 2021-08-11 | End: 2021-08-11

## 2021-08-11 RX ORDER — CYANOCOBALAMIN 1000 UG/ML
1000 INJECTION INTRAMUSCULAR; SUBCUTANEOUS ONCE
Status: CANCELLED | OUTPATIENT
Start: 2021-08-18

## 2021-08-11 RX ORDER — SODIUM CHLORIDE 9 MG/ML
20 INJECTION, SOLUTION INTRAVENOUS ONCE
Status: CANCELLED | OUTPATIENT
Start: 2021-09-08

## 2021-08-11 RX ADMIN — FERUMOXYTOL 510 MG: 510 INJECTION INTRAVENOUS at 15:20

## 2021-08-11 RX ADMIN — SODIUM CHLORIDE 20 ML/HR: 9 INJECTION, SOLUTION INTRAVENOUS at 15:19

## 2021-08-11 RX ADMIN — CYANOCOBALAMIN 1000 MCG: 1000 INJECTION, SOLUTION INTRAMUSCULAR; SUBCUTANEOUS at 15:50

## 2021-08-11 NOTE — PLAN OF CARE
Problem: Potential for Falls  Goal: Patient will remain free of falls  Description: INTERVENTIONS:  - Educate patient/family on patient safety including physical limitations  - Instruct patient to call for assistance with activity   - Consult OT/PT to assist with strengthening/mobility   - Keep Call bell within reach  - Keep bed low and locked with side rails adjusted as appropriate  - Keep care items and personal belongings within reach  - Initiate and maintain comfort rounds  - Make Fall Risk Sign visible to staff  -Outcome: Progressing

## 2021-08-16 RX ORDER — CYANOCOBALAMIN 1000 UG/ML
1000 INJECTION INTRAMUSCULAR; SUBCUTANEOUS ONCE
Status: CANCELLED | OUTPATIENT
Start: 2021-08-18

## 2021-08-18 ENCOUNTER — OFFICE VISIT (OUTPATIENT)
Dept: SURGERY | Facility: CLINIC | Age: 36
End: 2021-08-18

## 2021-08-18 ENCOUNTER — HOSPITAL ENCOUNTER (OUTPATIENT)
Dept: INFUSION CENTER | Facility: HOSPITAL | Age: 36
Discharge: HOME | End: 2021-08-18
Attending: INTERNAL MEDICINE
Payer: COMMERCIAL

## 2021-08-18 VITALS — WEIGHT: 171.2 LBS | BODY MASS INDEX: 27.63 KG/M2 | TEMPERATURE: 98.1 F

## 2021-08-18 VITALS — TEMPERATURE: 97.9 F

## 2021-08-18 DIAGNOSIS — Z98.84 S/P GASTRIC BYPASS: Primary | ICD-10-CM

## 2021-08-18 DIAGNOSIS — E53.8 B12 DEFICIENCY: ICD-10-CM

## 2021-08-18 DIAGNOSIS — Z09 SURGICAL FOLLOW-UP CARE: ICD-10-CM

## 2021-08-18 DIAGNOSIS — K80.20 SYMPTOMATIC CHOLELITHIASIS: Primary | ICD-10-CM

## 2021-08-18 PROCEDURE — 99024 POSTOP FOLLOW-UP VISIT: CPT | Performed by: SPECIALIST

## 2021-08-18 PROCEDURE — 96372 THER/PROPH/DIAG INJ SC/IM: CPT

## 2021-08-18 RX ORDER — CYANOCOBALAMIN 1000 UG/ML
1000 INJECTION INTRAMUSCULAR; SUBCUTANEOUS ONCE
Status: COMPLETED | OUTPATIENT
Start: 2021-08-18 | End: 2021-08-18

## 2021-08-18 RX ORDER — CYANOCOBALAMIN 1000 UG/ML
1000 INJECTION INTRAMUSCULAR; SUBCUTANEOUS ONCE
Status: CANCELLED | OUTPATIENT
Start: 2021-08-25

## 2021-08-18 RX ADMIN — CYANOCOBALAMIN 1000 MCG: 1000 INJECTION, SOLUTION INTRAMUSCULAR; SUBCUTANEOUS at 08:32

## 2021-08-18 NOTE — PROGRESS NOTES
General Surgery Office Visit Follow up   [facility]  Patient: Alejandra Evans   : 1985 Sex: female MRN: [id number]   CSN: [id number] PCP: Ivelisse Hurtado DO    Assessment/ Plan:  Alejandra Evans is a 43 y o  female  day(s) POD #   2 weeks S/p laparoscopic cholecystectomy  Symptomatic cholelithiasis [K80 20]    Plan  Stable postop    discharge from follow-up    SUBJECTIVE:     I am doing very well  I had gallbladder surgery a back to work  OBJECTIVE:  No complaints  No fever no chills no rigors  Tolerating p o  Diet well  Normal bowel movement no constipation or diarrhea  Ambulating well   Vitals:   Temp 98 1 °F (36 7 °C)   Wt 77 7 kg (171 lb 3 2 oz)   LMP 2021 Comment: had a T  L   BMI 27 63 kg/m²     Active medications:    Current Outpatient Medications:     acetaminophen (TYLENOL) 325 mg tablet, Take 650 mg by mouth every 6 (six) hours as needed for mild pain, Disp: , Rfl:     CALCIUM CITRATE PO, Take by mouth 3 (three) times a day, Disp: , Rfl:     cyanocobalamin (VITAMIN B-12) 100 MCG tablet, Take 1,000 mcg by mouth daily Gets injection momthly, Disp: , Rfl:     furosemide (LASIX) 20 mg tablet, Take 20 mg by mouth as needed, Disp: , Rfl:     Multiple Vitamin (multivitamin) tablet, Take 1 tablet by mouth daily Bariatric one a day, Disp: , Rfl:     oxyCODONE-acetaminophen (PERCOCET) 5-325 mg per tablet, Take 1 tablet by mouth every 4 (four) hours as needed for moderate pain for up to 6 dosesMax Daily Amount: 6 tablets, Disp: 6 tablet, Rfl: 0    sucralfate (CARAFATE) 1 g tablet, Take 1 tablet (1 g total) by mouth 4 (four) times a day (Patient not taking: Reported on 2021), Disp: 120 tablet, Rfl: 0  No current facility-administered medications for this visit      Physical Exam:   General Alert awake   Normocephalic atraumatic PERRLA   Abdomen soft,non tender Bowel sounds present  Skin: surgical dressing is C/D/I  Ext: No clubbing, cyanosis, edema  Surgical wound well healed    Visit Diagnosis:   Diagnoses and all orders for this visit:    Symptomatic cholelithiasis    Surgical follow-up care       Plan of care was discussed with patient in detail    Pertinent labs reviewed  Most Recent Labs:   No visits with results within 2 Week(s) from this visit  Latest known visit with results is:   Admission on 08/04/2021, Discharged on 08/04/2021   Component Date Value Ref Range Status    Case Report 08/04/2021    Final                    Value:Surgical Pathology Report                         Case: [id number]                                   Authorizing Provider:  Miguelina Salgado MD         Collected:           08/04/2021 0946              Ordering Location:     [facility] Received:            08/04/2021 [address] [facility]                                     Operating Room                                                               Pathologist:           January Clement MD                                                   Specimen:    Gallbladder                                                                                Final Diagnosis 08/04/2021    Final                    Value: This result contains rich text formatting which cannot be displayed here   Additional Information 08/04/2021    Final                    Value: This result contains rich text formatting which cannot be displayed here  Lyndsey Armendariz Description 08/04/2021    Final                    Value: This result contains rich text formatting which cannot be displayed here  Pertinent images and available reads personally reviewed  No results found  Pertinent notes reviewed  Final Diagnosis  A  Gallbladder:  - Chronic cholecystitis with cholelithiasis  - Negative for malignancy  Counseling / Coordination of Care  Total Office time /unit time spent today 15minutes  Greater than 50% of total time was spent with the patient and / or family counseling and / or coordination of care   A description of the counseling / coordination of care:  I performed an interim history, pertinent images and labs, performed a physical examination to arrive at the plan delineated above with associated thought processes  Family member/primary contact updated     Sandra Mullen MD MS FRCS [facility]  08/18/21 8:42 AM        Portions of the record may have been created with voice recognition software  Occasional wrong word or "sound a like" substitutions may have occurred due to the inherent limitations of voice recognition software  Read the chart carefully and recognize, using context, where substitutions have occurred

## 2021-08-25 ENCOUNTER — HOSPITAL ENCOUNTER (OUTPATIENT)
Dept: INFUSION CENTER | Facility: HOSPITAL | Age: 36
Discharge: HOME | End: 2021-08-25
Attending: INTERNAL MEDICINE
Payer: COMMERCIAL

## 2021-08-25 VITALS — TEMPERATURE: 98.6 F

## 2021-08-25 DIAGNOSIS — F41.9 ANXIETY: ICD-10-CM

## 2021-08-25 DIAGNOSIS — Z98.84 S/P GASTRIC BYPASS: Primary | ICD-10-CM

## 2021-08-25 DIAGNOSIS — E53.8 B12 DEFICIENCY: ICD-10-CM

## 2021-08-25 PROCEDURE — 96372 THER/PROPH/DIAG INJ SC/IM: CPT

## 2021-08-25 RX ORDER — CYANOCOBALAMIN 1000 UG/ML
1000 INJECTION INTRAMUSCULAR; SUBCUTANEOUS ONCE
Status: CANCELLED | OUTPATIENT
Start: 2021-09-01

## 2021-08-25 RX ORDER — CYANOCOBALAMIN 1000 UG/ML
1000 INJECTION INTRAMUSCULAR; SUBCUTANEOUS ONCE
Status: COMPLETED | OUTPATIENT
Start: 2021-08-25 | End: 2021-08-25

## 2021-08-25 RX ORDER — ESCITALOPRAM OXALATE 10 MG/1
10 TABLET ORAL DAILY
Qty: 30 TABLET | Refills: 5 | Status: SHIPPED | OUTPATIENT
Start: 2021-08-25 | End: 2021-10-28

## 2021-08-25 RX ADMIN — CYANOCOBALAMIN 1000 MCG: 1000 INJECTION, SOLUTION INTRAMUSCULAR; SUBCUTANEOUS at 15:38

## 2021-08-25 NOTE — PLAN OF CARE
Problem: SAFETY ADULT  Goal: Patient will remain free of falls  Description: INTERVENTIONS:  - Educate patient/family on patient safety including physical limitations  - Instruct patient to call for assistance with activity   - Consult OT/PT to assist with strengthening/mobility   - Keep Call bell within reach  - Keep bed low and locked with side rails adjusted as appropriate  - Keep care items and personal belongings within reach    Outcome: Progressing     Problem: Knowledge Deficit  Goal: Patient/family/caregiver demonstrates understanding of disease process, treatment plan, medications, and discharge instructions  Description: Complete learning assessment and assess knowledge base    Interventions:  - Provide teaching at level of understanding  - Provide teaching via preferred learning methods  Outcome: Progressing

## 2021-09-01 ENCOUNTER — HOSPITAL ENCOUNTER (OUTPATIENT)
Dept: INFUSION CENTER | Facility: HOSPITAL | Age: 36
Discharge: HOME | End: 2021-09-01
Attending: INTERNAL MEDICINE
Payer: COMMERCIAL

## 2021-09-01 VITALS
HEART RATE: 58 BPM | TEMPERATURE: 97.3 F | SYSTOLIC BLOOD PRESSURE: 119 MMHG | RESPIRATION RATE: 18 BRPM | OXYGEN SATURATION: 100 % | DIASTOLIC BLOOD PRESSURE: 60 MMHG

## 2021-09-01 DIAGNOSIS — E53.8 B12 DEFICIENCY: ICD-10-CM

## 2021-09-01 DIAGNOSIS — Z98.84 S/P GASTRIC BYPASS: Primary | ICD-10-CM

## 2021-09-01 PROCEDURE — 96372 THER/PROPH/DIAG INJ SC/IM: CPT

## 2021-09-01 RX ORDER — CYANOCOBALAMIN 1000 UG/ML
1000 INJECTION INTRAMUSCULAR; SUBCUTANEOUS ONCE
Status: COMPLETED | OUTPATIENT
Start: 2021-09-01 | End: 2021-09-01

## 2021-09-01 RX ORDER — CYANOCOBALAMIN 1000 UG/ML
1000 INJECTION INTRAMUSCULAR; SUBCUTANEOUS ONCE
Status: CANCELLED | OUTPATIENT
Start: 2021-09-08

## 2021-09-01 RX ADMIN — CYANOCOBALAMIN 1000 MCG: 1000 INJECTION, SOLUTION INTRAMUSCULAR; SUBCUTANEOUS at 15:41

## 2021-09-08 ENCOUNTER — HOSPITAL ENCOUNTER (OUTPATIENT)
Dept: INFUSION CENTER | Facility: HOSPITAL | Age: 36
Discharge: HOME | End: 2021-09-08
Attending: INTERNAL MEDICINE

## 2021-09-08 ENCOUNTER — TELEPHONE (OUTPATIENT)
Dept: BARIATRICS | Facility: CLINIC | Age: 36
End: 2021-09-08

## 2021-09-09 ENCOUNTER — HOSPITAL ENCOUNTER (OUTPATIENT)
Dept: INFUSION CENTER | Facility: HOSPITAL | Age: 36
Discharge: HOME | End: 2021-09-09
Attending: INTERNAL MEDICINE
Payer: COMMERCIAL

## 2021-09-09 VITALS
OXYGEN SATURATION: 99 % | TEMPERATURE: 98.7 F | DIASTOLIC BLOOD PRESSURE: 68 MMHG | RESPIRATION RATE: 16 BRPM | SYSTOLIC BLOOD PRESSURE: 118 MMHG | HEART RATE: 58 BPM

## 2021-09-09 DIAGNOSIS — E61.1 IRON DEFICIENCY: Primary | ICD-10-CM

## 2021-09-09 DIAGNOSIS — Z98.84 S/P GASTRIC BYPASS: ICD-10-CM

## 2021-09-09 DIAGNOSIS — E53.8 B12 DEFICIENCY: ICD-10-CM

## 2021-09-09 DIAGNOSIS — K91.2 POSTSURGICAL MALABSORPTION: ICD-10-CM

## 2021-09-09 PROCEDURE — 96372 THER/PROPH/DIAG INJ SC/IM: CPT

## 2021-09-09 PROCEDURE — 96365 THER/PROPH/DIAG IV INF INIT: CPT

## 2021-09-09 RX ORDER — CYANOCOBALAMIN 1000 UG/ML
1000 INJECTION INTRAMUSCULAR; SUBCUTANEOUS ONCE
Status: COMPLETED | OUTPATIENT
Start: 2021-09-09 | End: 2021-09-09

## 2021-09-09 RX ORDER — SODIUM CHLORIDE 9 MG/ML
20 INJECTION, SOLUTION INTRAVENOUS ONCE
Status: CANCELLED | OUTPATIENT
Start: 2021-09-14

## 2021-09-09 RX ORDER — SODIUM CHLORIDE 9 MG/ML
20 INJECTION, SOLUTION INTRAVENOUS ONCE
Status: COMPLETED | OUTPATIENT
Start: 2021-09-09 | End: 2021-09-09

## 2021-09-09 RX ORDER — CYANOCOBALAMIN 1000 UG/ML
1000 INJECTION INTRAMUSCULAR; SUBCUTANEOUS ONCE
Status: CANCELLED | OUTPATIENT
Start: 2021-09-15

## 2021-09-09 RX ADMIN — FERUMOXYTOL 510 MG: 510 INJECTION INTRAVENOUS at 13:02

## 2021-09-09 RX ADMIN — SODIUM CHLORIDE 20 ML/HR: 9 INJECTION, SOLUTION INTRAVENOUS at 13:02

## 2021-09-09 RX ADMIN — CYANOCOBALAMIN 1000 MCG: 1000 INJECTION, SOLUTION INTRAMUSCULAR; SUBCUTANEOUS at 12:52

## 2021-09-26 ENCOUNTER — OFFICE VISIT (OUTPATIENT)
Dept: URGENT CARE | Facility: CLINIC | Age: 36
End: 2021-09-26
Payer: COMMERCIAL

## 2021-09-26 VITALS
HEART RATE: 49 BPM | TEMPERATURE: 98.3 F | SYSTOLIC BLOOD PRESSURE: 137 MMHG | BODY MASS INDEX: 27.64 KG/M2 | WEIGHT: 172 LBS | OXYGEN SATURATION: 99 % | DIASTOLIC BLOOD PRESSURE: 79 MMHG | HEIGHT: 66 IN | RESPIRATION RATE: 16 BRPM

## 2021-09-26 DIAGNOSIS — M54.2 NECK PAIN: ICD-10-CM

## 2021-09-26 DIAGNOSIS — M62.838 MUSCLE SPASMS OF NECK: Primary | ICD-10-CM

## 2021-09-26 PROCEDURE — 99213 OFFICE O/P EST LOW 20 MIN: CPT | Performed by: STUDENT IN AN ORGANIZED HEALTH CARE EDUCATION/TRAINING PROGRAM

## 2021-09-26 RX ORDER — CYCLOBENZAPRINE HCL 10 MG
10 TABLET ORAL 3 TIMES DAILY PRN
Qty: 30 TABLET | Refills: 0 | Status: SHIPPED | OUTPATIENT
Start: 2021-09-26 | End: 2022-03-17

## 2021-09-26 NOTE — PROGRESS NOTES
[facility]        NAME: Bijal Fitzgerald is a 43 y o  female  : 1985    MRN: [id number]  DATE: 2021  TIME: 11:25 AM    Assessment and Plan   Muscle spasms of neck [M62 838]  1  Muscle spasms of neck  cyclobenzaprine (FLEXERIL) 10 mg tablet    Ambulatory referral to Physical Therapy   2  Neck pain  cyclobenzaprine (FLEXERIL) 10 mg tablet    Ambulatory referral to Physical Therapy     · Pain likely from muscle spasm of neck, continue for the pleural and physical therapy as directed follow-up PCP Dr Ubaldo Harp    Patient Instructions     -handouts provided to patient regarding diagnosis   -handouts reviewed with patient, advised patient to follow up PCP 2-3 days   -if patient's symptoms do not resolve or significantly worsen, instructed to go straight to the ER, complications discussed  Chief Complaint     Chief Complaint   Patient presents with    Neck Pain     Pt reports of right sided neck pain started last week  Pt denies any recent trauma  History of Present Illness       HPI  Patient states that she woke up on Monday morning with pain in the right side of her neck and back  She states but by the end of the day she has a lot of pain and pain shoots down her upper arm  Patient denies any trauma  She thinks she just slept wrong  Patient denies any weakness numbness or tingling  Review of Systems   Review of Systems   Constitutional: Negative for activity change, chills, fatigue and fever  HENT: Negative for ear discharge, ear pain, sinus pain, sore throat and tinnitus  Eyes: Negative for visual disturbance  Respiratory: Negative for cough, chest tightness and shortness of breath  Cardiovascular: Negative for chest pain, palpitations and leg swelling  Gastrointestinal: Negative for abdominal pain, constipation, diarrhea, nausea and vomiting  Genitourinary: Negative for difficulty urinating, dysuria, flank pain, frequency and urgency     Musculoskeletal: Positive for neck pain  Negative for arthralgias, back pain, gait problem and joint swelling  Skin: Negative for rash  Neurological: Negative for dizziness, light-headedness and headaches  Hematological: Negative for adenopathy  Psychiatric/Behavioral: Negative for agitation, behavioral problems and confusion           Current Medications       Current Outpatient Medications:     acetaminophen (TYLENOL) 325 mg tablet, Take 650 mg by mouth every 6 (six) hours as needed for mild pain, Disp: , Rfl:     CALCIUM CITRATE PO, Take by mouth 3 (three) times a day, Disp: , Rfl:     cyanocobalamin (VITAMIN B-12) 100 MCG tablet, Take 1,000 mcg by mouth daily Gets injection momthly, Disp: , Rfl:     cyclobenzaprine (FLEXERIL) 10 mg tablet, Take 1 tablet (10 mg total) by mouth 3 (three) times a day as needed for muscle spasms, Disp: 30 tablet, Rfl: 0    escitalopram (LEXAPRO) 10 mg tablet, TAKE 1 TABLET (10 MG TOTAL) BY MOUTH DAILY, Disp: 30 tablet, Rfl: 5    furosemide (LASIX) 20 mg tablet, Take 20 mg by mouth as needed, Disp: , Rfl:     Multiple Vitamin (multivitamin) tablet, Take 1 tablet by mouth daily Bariatric one a day, Disp: , Rfl:     oxyCODONE-acetaminophen (PERCOCET) 5-325 mg per tablet, Take 1 tablet by mouth every 4 (four) hours as needed for moderate pain for up to 6 dosesMax Daily Amount: 6 tablets, Disp: 6 tablet, Rfl: 0    sucralfate (CARAFATE) 1 g tablet, Take 1 tablet (1 g total) by mouth 4 (four) times a day (Patient not taking: Reported on 6/17/2021), Disp: 120 tablet, Rfl: 0    Current Allergies     Allergies as of 09/26/2021 - Reviewed 09/26/2021   Allergen Reaction Noted    Valtrex [valacyclovir] Swelling 08/19/2019    Amoxicillin  12/17/2018            The following portions of the patient's history were reviewed and updated as appropriate: allergies, current medications, past family history, past medical history, past social history, past surgical history and problem list      Past Medical History:   Diagnosis Date    Allergic rhinitis     Anemia 2012    ok currently    Anxiety     Asthma     activity induced    Benign neoplasm of skin of trunk     Morbid obesity with BMI of 40 0-44 9, adult (HCC)     Polycystic ovary syndrome     Postgastrectomy malabsorption     Skin tag     Sleep apnea     Tinea versicolor        Past Surgical History:   Procedure Laterality Date    CERVICAL CERCLAGE       SECTION  2017    PARAESOPHAGEAL HERNIA REPAIR N/A 2020    Procedure: REPAIR HERNIA PARAESOPHAGEAL  LAPAROSCOPIC;  Surgeon: Zain Whyte MD;  Location: [facility];  Service: Bariatrics    CO LAP GASTRIC BYPASS/JENNIFER-EN-Y N/A 2020    Procedure: BYPASS GASTRIC  JENNIFER-EN-Y LAPAROSCOPIC;  Surgeon: Zain Whyte MD;  Location: [address] [place] [address];  Service: Bariatrics    CO LAP,CHOLECYSTECTOMY N/A 2021    Procedure: CHOLECYSTECTOMY LAPAROSCOPIC;  Surgeon: Jaquelin Manjarrez MD;  Location: [address] [place] [address];  Service: General    TONSILLECTOMY      TUBAL LIGATION         Family History   Problem Relation Age of Onset    Diabetes Mother     Heart disease Mother         CHF    Hypertension Mother     Kidney cancer Mother         2020    Cancer Father         kidney    Aneurysm Father         AAA    COPD Father     Kidney disease Father     Kidney cancer Father             No Known Problems Sister     Cancer Maternal Grandmother         bladder    Cancer Paternal Grandfather         brain    Stroke Paternal Grandfather     No Known Problems Sister     No Known Problems Sister          Medications have been verified  Objective   /79   Pulse (!) 49   Temp 98 3 °F (36 8 °C)   Resp 16   Ht 5' 6" (1 676 m)   Wt 78 kg (172 lb)   SpO2 99%   BMI 27 76 kg/m²   No LMP recorded  Physical Exam     Physical Exam  Constitutional:       Appearance: She is well-developed  HENT:      Head: Normocephalic and atraumatic        Right Ear: Tympanic membrane normal       Left Ear: Tympanic membrane normal       Nose: Nose normal       Mouth/Throat:      Mouth: Mucous membranes are moist       Pharynx: Oropharynx is clear  Eyes:      Conjunctiva/sclera: Conjunctivae normal       Pupils: Pupils are equal, round, and reactive to light  Neck:      Comments: Spurling sign positive, tenderness over right trapezius in multiple spots  Cardiovascular:      Rate and Rhythm: Normal rate and regular rhythm  Heart sounds: Normal heart sounds  Pulmonary:      Effort: Pulmonary effort is normal  No respiratory distress  Breath sounds: Normal breath sounds  No wheezing  Chest:      Chest wall: No tenderness  Abdominal:      General: Bowel sounds are normal  There is no distension  Palpations: Abdomen is soft  There is no mass  Tenderness: There is no abdominal tenderness  Musculoskeletal:         General: Tenderness present  No swelling  Cervical back: Normal range of motion and neck supple  Tenderness present  Lymphadenopathy:      Cervical: No cervical adenopathy  Skin:     General: Skin is warm  Neurological:      Mental Status: She is alert and oriented to person, place, and time

## 2021-09-26 NOTE — PATIENT INSTRUCTIONS
Muscle Spasm   WHAT YOU NEED TO KNOW:   A muscle spasm is a sudden contraction of any muscle or group of muscles  A muscle cramp is a painful muscle spasm  Muscle cramps commonly occur after intense exercise or during pregnancy  They may also be caused by certain medications, dehydration, low calcium or magnesium levels, or another medical condition  DISCHARGE INSTRUCTIONS:   Medicines: You may need the following:  · NSAIDs  help decrease swelling and pain or fever  This medicine is available with or without a doctor's order  NSAIDs can cause stomach bleeding or kidney problems in certain people  If you take blood thinner medicine, always ask your healthcare provider if NSAIDs are safe for you  Always read the medicine label and follow directions  · Take your medicine as directed  Contact your healthcare provider if you think your medicine is not helping or if you have side effects  Tell him of her if you are allergic to any medicine  Keep a list of the medicines, vitamins, and herbs you take  Include the amounts, and when and why you take them  Bring the list or the pill bottles to follow-up visits  Carry your medicine list with you in case of an emergency  Follow up with your healthcare provider as directed: You may need other tests or treatment  You may also be referred to a physical therapist or other specialist  Write down your questions so you remember to ask them during your visits  Self-care:   · Stretch  your muscle to help relieve the cramp  It may be helpful to keep your muscle in the stretched position until the cramp is gone  · Apply heat  to help decrease pain and muscle spasms  Apply heat on the area for 20 to 30 minutes every 2 hours for as many days as directed  · Apply ice  to help decrease swelling and pain  Ice may also help prevent tissue damage  Use an ice pack, or put crushed ice in a plastic bag   Cover it with a towel and place it on your muscle for 15 to 20 minutes every hour or as directed  · Drink more liquids  to help prevent muscle cramps caused by dehydration  Sports drinks may help replace electrolytes you lose through sweat during exercise  Ask your healthcare provider how much liquid to drink each day and which liquids are best for you  · Eat healthy foods , such as fruits, vegetables, whole grains, low-fat dairy products, and lean proteins (meat, beans, and fish)  If you are pregnant, ask your healthcare provider about foods that are high in magnesium and sodium  They may help to relieve cramps during pregnancy  · Massage your muscle  to help relieve the cramp  · Take frequent deep breaths  until the cramp feels better  Lie down while you take the deep breaths so you do not get dizzy or lightheaded  Contact your healthcare provider if:   · You have signs of dehydration, such as a headache, dark yellow urine, dry eyes or mouth, or a fast heartbeat  · You have questions or concerns about your condition or care  Return to the emergency department if:   · You have warmth, swelling, or redness in the cramping muscle  · You have frequent or unrelieved muscle cramps in several different muscles  · You have muscle cramps with numbness, tingling, and burning in your hands and feet  © Copyright [employer] 2021 Information is for End User's use only and may not be sold, redistributed or otherwise used for commercial purposes  All illustrations and images included in CareNotes® are the copyrighted property of A D A M , Inc  or Brian Prado   The above information is an  only  It is not intended as medical advice for individual conditions or treatments  Talk to your doctor, nurse or pharmacist before following any medical regimen to see if it is safe and effective for you

## 2021-10-07 ENCOUNTER — APPOINTMENT (OUTPATIENT)
Dept: LAB | Facility: HOSPITAL | Age: 36
End: 2021-10-07
Attending: INTERNAL MEDICINE
Payer: COMMERCIAL

## 2021-10-07 ENCOUNTER — HOSPITAL ENCOUNTER (OUTPATIENT)
Dept: INFUSION CENTER | Facility: HOSPITAL | Age: 36
Discharge: HOME | End: 2021-10-07
Attending: INTERNAL MEDICINE
Payer: COMMERCIAL

## 2021-10-07 VITALS
SYSTOLIC BLOOD PRESSURE: 121 MMHG | OXYGEN SATURATION: 100 % | HEART RATE: 61 BPM | RESPIRATION RATE: 18 BRPM | DIASTOLIC BLOOD PRESSURE: 67 MMHG | TEMPERATURE: 98 F

## 2021-10-07 DIAGNOSIS — E61.1 IRON DEFICIENCY: Primary | ICD-10-CM

## 2021-10-07 DIAGNOSIS — K91.2 POSTSURGICAL MALABSORPTION: ICD-10-CM

## 2021-10-07 DIAGNOSIS — K95.89 IRON DEFICIENCY ANEMIA FOLLOWING BARIATRIC SURGERY: ICD-10-CM

## 2021-10-07 DIAGNOSIS — D50.8 IRON DEFICIENCY ANEMIA FOLLOWING BARIATRIC SURGERY: ICD-10-CM

## 2021-10-07 DIAGNOSIS — E61.1 IRON DEFICIENCY: ICD-10-CM

## 2021-10-07 DIAGNOSIS — E53.8 B12 DEFICIENCY: ICD-10-CM

## 2021-10-07 LAB
BASOPHILS # BLD AUTO: 0.03 THOUSANDS/ΜL (ref 0–0.1)
BASOPHILS NFR BLD AUTO: 1 % (ref 0–1)
BILIRUB UR QL STRIP: NEGATIVE
CLARITY UR: CLEAR
COLOR UR: YELLOW
EOSINOPHIL # BLD AUTO: 0.18 THOUSAND/ΜL (ref 0–0.61)
EOSINOPHIL NFR BLD AUTO: 4 % (ref 0–6)
ERYTHROCYTE [DISTWIDTH] IN BLOOD BY AUTOMATED COUNT: 13.2 % (ref 11.6–15.1)
FERRITIN SERPL-MCNC: 99 NG/ML (ref 8–388)
GLUCOSE UR STRIP-MCNC: NEGATIVE MG/DL
HCT VFR BLD AUTO: 43.1 % (ref 34.8–46.1)
HGB BLD-MCNC: 14 G/DL (ref 11.5–15.4)
HGB UR QL STRIP.AUTO: NEGATIVE
IMM GRANULOCYTES # BLD AUTO: 0.01 THOUSAND/UL (ref 0–0.2)
IMM GRANULOCYTES NFR BLD AUTO: 0 % (ref 0–2)
IRON SATN MFR SERPL: 45 % (ref 15–50)
IRON SERPL-MCNC: 105 UG/DL (ref 50–170)
KETONES UR STRIP-MCNC: NEGATIVE MG/DL
LEUKOCYTE ESTERASE UR QL STRIP: NEGATIVE
LYMPHOCYTES # BLD AUTO: 1.42 THOUSANDS/ΜL (ref 0.6–4.47)
LYMPHOCYTES NFR BLD AUTO: 28 % (ref 14–44)
MCH RBC QN AUTO: 28.5 PG (ref 26.8–34.3)
MCHC RBC AUTO-ENTMCNC: 32.5 G/DL (ref 31.4–37.4)
MCV RBC AUTO: 88 FL (ref 82–98)
MONOCYTES # BLD AUTO: 0.43 THOUSAND/ΜL (ref 0.17–1.22)
MONOCYTES NFR BLD AUTO: 9 % (ref 4–12)
NEUTROPHILS # BLD AUTO: 3.01 THOUSANDS/ΜL (ref 1.85–7.62)
NEUTS SEG NFR BLD AUTO: 58 % (ref 43–75)
NITRITE UR QL STRIP: NEGATIVE
NRBC BLD AUTO-RTO: 0 /100 WBCS
PH UR STRIP.AUTO: 6 [PH]
PLATELET # BLD AUTO: 166 THOUSANDS/UL (ref 149–390)
PMV BLD AUTO: 11.7 FL (ref 8.9–12.7)
PROT UR STRIP-MCNC: NEGATIVE MG/DL
RBC # BLD AUTO: 4.92 MILLION/UL (ref 3.81–5.12)
SP GR UR STRIP.AUTO: 1.02 (ref 1–1.03)
TIBC SERPL-MCNC: 231 UG/DL (ref 250–450)
UROBILINOGEN UR QL STRIP.AUTO: 1 E.U./DL
VIT B12 SERPL-MCNC: 462 PG/ML (ref 100–900)
WBC # BLD AUTO: 5.08 THOUSAND/UL (ref 4.31–10.16)

## 2021-10-07 PROCEDURE — 83540 ASSAY OF IRON: CPT

## 2021-10-07 PROCEDURE — 85025 COMPLETE CBC W/AUTO DIFF WBC: CPT

## 2021-10-07 PROCEDURE — 96372 THER/PROPH/DIAG INJ SC/IM: CPT

## 2021-10-07 PROCEDURE — 81003 URINALYSIS AUTO W/O SCOPE: CPT

## 2021-10-07 PROCEDURE — 83550 IRON BINDING TEST: CPT

## 2021-10-07 PROCEDURE — 82728 ASSAY OF FERRITIN: CPT

## 2021-10-07 PROCEDURE — 36415 COLL VENOUS BLD VENIPUNCTURE: CPT

## 2021-10-07 PROCEDURE — 82607 VITAMIN B-12: CPT

## 2021-10-07 PROCEDURE — 96365 THER/PROPH/DIAG IV INF INIT: CPT

## 2021-10-07 RX ORDER — CYANOCOBALAMIN 1000 UG/ML
1000 INJECTION INTRAMUSCULAR; SUBCUTANEOUS ONCE
Status: COMPLETED | OUTPATIENT
Start: 2021-10-07 | End: 2021-10-07

## 2021-10-07 RX ORDER — CYANOCOBALAMIN 1000 UG/ML
1000 INJECTION INTRAMUSCULAR; SUBCUTANEOUS ONCE
Status: CANCELLED | OUTPATIENT
Start: 2021-11-06

## 2021-10-07 RX ORDER — SODIUM CHLORIDE 9 MG/ML
20 INJECTION, SOLUTION INTRAVENOUS ONCE
Status: CANCELLED | OUTPATIENT
Start: 2021-11-04

## 2021-10-07 RX ORDER — SODIUM CHLORIDE 9 MG/ML
20 INJECTION, SOLUTION INTRAVENOUS ONCE
Status: COMPLETED | OUTPATIENT
Start: 2021-10-07 | End: 2021-10-07

## 2021-10-07 RX ADMIN — FERUMOXYTOL 510 MG: 510 INJECTION INTRAVENOUS at 13:19

## 2021-10-07 RX ADMIN — CYANOCOBALAMIN 1000 MCG: 1000 INJECTION, SOLUTION INTRAMUSCULAR; SUBCUTANEOUS at 13:26

## 2021-10-07 RX ADMIN — SODIUM CHLORIDE 20 ML/HR: 0.9 INJECTION, SOLUTION INTRAVENOUS at 13:18

## 2021-10-21 ENCOUNTER — OFFICE VISIT (OUTPATIENT)
Dept: HEMATOLOGY ONCOLOGY | Facility: MEDICAL CENTER | Age: 36
End: 2021-10-21
Payer: COMMERCIAL

## 2021-10-21 VITALS
TEMPERATURE: 98 F | RESPIRATION RATE: 17 BRPM | HEART RATE: 55 BPM | BODY MASS INDEX: 28.09 KG/M2 | HEIGHT: 66 IN | SYSTOLIC BLOOD PRESSURE: 124 MMHG | WEIGHT: 174.8 LBS | DIASTOLIC BLOOD PRESSURE: 82 MMHG

## 2021-10-21 DIAGNOSIS — E53.8 B12 DEFICIENCY: ICD-10-CM

## 2021-10-21 DIAGNOSIS — K91.2 POSTSURGICAL MALABSORPTION: Primary | ICD-10-CM

## 2021-10-21 DIAGNOSIS — Z86.39 HX OF IRON DEFICIENCY: ICD-10-CM

## 2021-10-21 PROCEDURE — 99215 OFFICE O/P EST HI 40 MIN: CPT | Performed by: PHYSICIAN ASSISTANT

## 2021-10-21 PROCEDURE — 1036F TOBACCO NON-USER: CPT | Performed by: PHYSICIAN ASSISTANT

## 2021-10-21 RX ORDER — CYANOCOBALAMIN 1000 UG/ML
1000 INJECTION INTRAMUSCULAR; SUBCUTANEOUS ONCE
Status: CANCELLED | OUTPATIENT
Start: 2021-11-04

## 2021-10-28 ENCOUNTER — OFFICE VISIT (OUTPATIENT)
Dept: FAMILY MEDICINE CLINIC | Facility: CLINIC | Age: 36
End: 2021-10-28
Payer: COMMERCIAL

## 2021-10-28 VITALS
HEIGHT: 66 IN | RESPIRATION RATE: 16 BRPM | BODY MASS INDEX: 27.48 KG/M2 | TEMPERATURE: 97.7 F | SYSTOLIC BLOOD PRESSURE: 126 MMHG | DIASTOLIC BLOOD PRESSURE: 80 MMHG | HEART RATE: 60 BPM | WEIGHT: 171 LBS | OXYGEN SATURATION: 98 %

## 2021-10-28 DIAGNOSIS — Z98.84 S/P GASTRIC BYPASS: Primary | ICD-10-CM

## 2021-10-28 DIAGNOSIS — R27.9 UNSPECIFIED LACK OF COORDINATION: ICD-10-CM

## 2021-10-28 DIAGNOSIS — F41.9 ANXIETY: ICD-10-CM

## 2021-10-28 DIAGNOSIS — E66.3 OVERWEIGHT (BMI 25.0-29.9): ICD-10-CM

## 2021-10-28 LAB
SL AMB POCT GLUCOSE BLD: 87
SL AMB POCT HGB: 13.1

## 2021-10-28 PROCEDURE — 85018 HEMOGLOBIN: CPT | Performed by: FAMILY MEDICINE

## 2021-10-28 PROCEDURE — 3008F BODY MASS INDEX DOCD: CPT | Performed by: PHYSICIAN ASSISTANT

## 2021-10-28 PROCEDURE — 82948 REAGENT STRIP/BLOOD GLUCOSE: CPT | Performed by: FAMILY MEDICINE

## 2021-10-28 PROCEDURE — 99214 OFFICE O/P EST MOD 30 MIN: CPT | Performed by: FAMILY MEDICINE

## 2021-10-28 RX ORDER — ESCITALOPRAM OXALATE 20 MG/1
20 TABLET ORAL DAILY
Qty: 90 TABLET | Refills: 3 | Status: SHIPPED | OUTPATIENT
Start: 2021-10-28 | End: 2022-03-17

## 2021-11-04 ENCOUNTER — HOSPITAL ENCOUNTER (OUTPATIENT)
Dept: INFUSION CENTER | Facility: HOSPITAL | Age: 36
Discharge: HOME | End: 2021-11-04
Attending: INTERNAL MEDICINE
Payer: COMMERCIAL

## 2021-11-04 VITALS — TEMPERATURE: 97.8 F

## 2021-11-04 DIAGNOSIS — E53.8 B12 DEFICIENCY: Primary | ICD-10-CM

## 2021-11-04 DIAGNOSIS — K91.2 POSTSURGICAL MALABSORPTION: ICD-10-CM

## 2021-11-04 PROCEDURE — 96372 THER/PROPH/DIAG INJ SC/IM: CPT

## 2021-11-04 RX ORDER — CYANOCOBALAMIN 1000 UG/ML
1000 INJECTION INTRAMUSCULAR; SUBCUTANEOUS ONCE
Status: COMPLETED | OUTPATIENT
Start: 2021-11-04 | End: 2021-11-04

## 2021-11-04 RX ORDER — CYANOCOBALAMIN 1000 UG/ML
1000 INJECTION INTRAMUSCULAR; SUBCUTANEOUS ONCE
Status: CANCELLED | OUTPATIENT
Start: 2021-12-02

## 2021-11-04 RX ADMIN — CYANOCOBALAMIN 1000 MCG: 1000 INJECTION, SOLUTION INTRAMUSCULAR; SUBCUTANEOUS at 13:10

## 2021-12-02 ENCOUNTER — HOSPITAL ENCOUNTER (OUTPATIENT)
Dept: INFUSION CENTER | Facility: HOSPITAL | Age: 36
Discharge: HOME | End: 2021-12-02
Attending: INTERNAL MEDICINE
Payer: COMMERCIAL

## 2021-12-02 ENCOUNTER — OFFICE VISIT (OUTPATIENT)
Dept: FAMILY MEDICINE CLINIC | Facility: CLINIC | Age: 36
End: 2021-12-02
Payer: COMMERCIAL

## 2021-12-02 VITALS
WEIGHT: 176.2 LBS | HEART RATE: 56 BPM | HEIGHT: 66 IN | DIASTOLIC BLOOD PRESSURE: 70 MMHG | OXYGEN SATURATION: 97 % | TEMPERATURE: 97.7 F | RESPIRATION RATE: 16 BRPM | SYSTOLIC BLOOD PRESSURE: 102 MMHG | BODY MASS INDEX: 28.32 KG/M2

## 2021-12-02 VITALS — TEMPERATURE: 99 F

## 2021-12-02 DIAGNOSIS — E66.3 OVERWEIGHT (BMI 25.0-29.9): ICD-10-CM

## 2021-12-02 DIAGNOSIS — E53.8 B12 DEFICIENCY: Primary | ICD-10-CM

## 2021-12-02 DIAGNOSIS — K91.2 POSTSURGICAL MALABSORPTION: ICD-10-CM

## 2021-12-02 DIAGNOSIS — Z98.84 S/P GASTRIC BYPASS: Primary | ICD-10-CM

## 2021-12-02 DIAGNOSIS — R60.9 PERIPHERAL EDEMA: ICD-10-CM

## 2021-12-02 PROCEDURE — 3008F BODY MASS INDEX DOCD: CPT | Performed by: FAMILY MEDICINE

## 2021-12-02 PROCEDURE — 99214 OFFICE O/P EST MOD 30 MIN: CPT | Performed by: FAMILY MEDICINE

## 2021-12-02 PROCEDURE — 1036F TOBACCO NON-USER: CPT | Performed by: FAMILY MEDICINE

## 2021-12-02 PROCEDURE — 96372 THER/PROPH/DIAG INJ SC/IM: CPT

## 2021-12-02 RX ORDER — CYANOCOBALAMIN 1000 UG/ML
1000 INJECTION INTRAMUSCULAR; SUBCUTANEOUS ONCE
Status: COMPLETED | OUTPATIENT
Start: 2021-12-02 | End: 2021-12-02

## 2021-12-02 RX ORDER — CYANOCOBALAMIN 1000 UG/ML
1000 INJECTION INTRAMUSCULAR; SUBCUTANEOUS ONCE
OUTPATIENT
Start: 2021-12-30

## 2021-12-02 RX ADMIN — CYANOCOBALAMIN 1000 MCG: 1000 INJECTION, SOLUTION INTRAMUSCULAR; SUBCUTANEOUS at 14:43

## 2021-12-03 LAB
BUN SERPL-MCNC: 11 MG/DL (ref 6–20)
BUN/CREAT SERPL: 13 (ref 9–23)
CALCIUM SERPL-MCNC: 9 MG/DL (ref 8.7–10.2)
CHLORIDE SERPL-SCNC: 103 MMOL/L (ref 96–106)
CO2 SERPL-SCNC: 24 MMOL/L (ref 20–29)
CREAT SERPL-MCNC: 0.83 MG/DL (ref 0.57–1)
GLUCOSE SERPL-MCNC: 79 MG/DL (ref 65–99)
POTASSIUM SERPL-SCNC: 4.1 MMOL/L (ref 3.5–5.2)
SL AMB EGFR AFRICAN AMERICAN: 103 ML/MIN/1.73
SL AMB EGFR NON AFRICAN AMERICAN: 90 ML/MIN/1.73
SODIUM SERPL-SCNC: 139 MMOL/L (ref 134–144)
TSH SERPL DL<=0.005 MIU/L-ACNC: 2.33 UIU/ML (ref 0.45–4.5)

## 2022-01-19 ENCOUNTER — TELEPHONE (OUTPATIENT)
Dept: FAMILY MEDICINE CLINIC | Facility: CLINIC | Age: 37
End: 2022-01-19

## 2022-01-19 NOTE — TELEPHONE ENCOUNTER
Dr Sondra Felix,    Patient is requesting a call back in regards to patient's dad and son  Patient 's dad was just in the hospital for pneumonia and was discharged on Monday, patient son's came up positive for COVID and patient would like to discuss the precautions that she needs to protect her dad  Please call patient back at [phone]

## 2022-01-20 ENCOUNTER — APPOINTMENT (OUTPATIENT)
Dept: LAB | Facility: HOSPITAL | Age: 37
End: 2022-01-20
Payer: COMMERCIAL

## 2022-01-20 DIAGNOSIS — E53.8 BIOTIN-(PROPIONYL-COA-CARBOXYLASE) LIGASE DEFICIENCY: ICD-10-CM

## 2022-01-20 DIAGNOSIS — E53.8 B12 DEFICIENCY: ICD-10-CM

## 2022-01-20 DIAGNOSIS — K91.2 HYPOGLYCEMIA FOLLOWING GASTROINTESTINAL SURGERY: ICD-10-CM

## 2022-01-20 DIAGNOSIS — K91.2 POSTSURGICAL MALABSORPTION: ICD-10-CM

## 2022-01-20 LAB
BASOPHILS # BLD AUTO: 0.03 THOUSANDS/ΜL (ref 0–0.1)
BASOPHILS NFR BLD AUTO: 1 % (ref 0–1)
EOSINOPHIL # BLD AUTO: 0.14 THOUSAND/ΜL (ref 0–0.61)
EOSINOPHIL NFR BLD AUTO: 3 % (ref 0–6)
ERYTHROCYTE [DISTWIDTH] IN BLOOD BY AUTOMATED COUNT: 13.4 % (ref 11.6–15.1)
FERRITIN SERPL-MCNC: 71 NG/ML (ref 8–388)
HCT VFR BLD AUTO: 42.8 % (ref 34.8–46.1)
HGB BLD-MCNC: 13.9 G/DL (ref 11.5–15.4)
IMM GRANULOCYTES # BLD AUTO: 0.01 THOUSAND/UL (ref 0–0.2)
IMM GRANULOCYTES NFR BLD AUTO: 0 % (ref 0–2)
IRON SATN MFR SERPL: 46 % (ref 15–50)
IRON SERPL-MCNC: 113 UG/DL (ref 50–170)
LYMPHOCYTES # BLD AUTO: 1.25 THOUSANDS/ΜL (ref 0.6–4.47)
LYMPHOCYTES NFR BLD AUTO: 23 % (ref 14–44)
MCH RBC QN AUTO: 28.5 PG (ref 26.8–34.3)
MCHC RBC AUTO-ENTMCNC: 32.5 G/DL (ref 31.4–37.4)
MCV RBC AUTO: 88 FL (ref 82–98)
MONOCYTES # BLD AUTO: 0.33 THOUSAND/ΜL (ref 0.17–1.22)
MONOCYTES NFR BLD AUTO: 6 % (ref 4–12)
NEUTROPHILS # BLD AUTO: 3.78 THOUSANDS/ΜL (ref 1.85–7.62)
NEUTS SEG NFR BLD AUTO: 67 % (ref 43–75)
NRBC BLD AUTO-RTO: 0 /100 WBCS
PLATELET # BLD AUTO: 176 THOUSANDS/UL (ref 149–390)
PMV BLD AUTO: 11.9 FL (ref 8.9–12.7)
RBC # BLD AUTO: 4.88 MILLION/UL (ref 3.81–5.12)
TIBC SERPL-MCNC: 244 UG/DL (ref 250–450)
VIT B12 SERPL-MCNC: 449 PG/ML (ref 100–900)
WBC # BLD AUTO: 5.54 THOUSAND/UL (ref 4.31–10.16)

## 2022-01-20 PROCEDURE — 36415 COLL VENOUS BLD VENIPUNCTURE: CPT

## 2022-01-20 PROCEDURE — 83550 IRON BINDING TEST: CPT

## 2022-01-20 PROCEDURE — 82728 ASSAY OF FERRITIN: CPT

## 2022-01-20 PROCEDURE — 82607 VITAMIN B-12: CPT

## 2022-01-20 PROCEDURE — 83540 ASSAY OF IRON: CPT

## 2022-01-20 PROCEDURE — 85025 COMPLETE CBC W/AUTO DIFF WBC: CPT

## 2022-01-27 ENCOUNTER — OFFICE VISIT (OUTPATIENT)
Dept: HEMATOLOGY ONCOLOGY | Facility: MEDICAL CENTER | Age: 37
End: 2022-01-27
Payer: COMMERCIAL

## 2022-01-27 ENCOUNTER — HOSPITAL ENCOUNTER (OUTPATIENT)
Dept: INFUSION CENTER | Facility: HOSPITAL | Age: 37
Discharge: HOME | End: 2022-01-27
Attending: INTERNAL MEDICINE
Payer: COMMERCIAL

## 2022-01-27 VITALS
BODY MASS INDEX: 29.73 KG/M2 | OXYGEN SATURATION: 98 % | WEIGHT: 185 LBS | TEMPERATURE: 97.6 F | DIASTOLIC BLOOD PRESSURE: 70 MMHG | RESPIRATION RATE: 16 BRPM | HEART RATE: 72 BPM | SYSTOLIC BLOOD PRESSURE: 128 MMHG | HEIGHT: 66 IN

## 2022-01-27 VITALS — TEMPERATURE: 97.7 F

## 2022-01-27 DIAGNOSIS — E53.8 B12 DEFICIENCY: ICD-10-CM

## 2022-01-27 DIAGNOSIS — K91.2 POSTSURGICAL MALABSORPTION: Primary | ICD-10-CM

## 2022-01-27 DIAGNOSIS — Z86.39 HX OF IRON DEFICIENCY: ICD-10-CM

## 2022-01-27 DIAGNOSIS — E61.1 IRON DEFICIENCY: ICD-10-CM

## 2022-01-27 DIAGNOSIS — E53.8 B12 DEFICIENCY: Primary | ICD-10-CM

## 2022-01-27 DIAGNOSIS — K91.2 POSTSURGICAL MALABSORPTION: ICD-10-CM

## 2022-01-27 PROCEDURE — 99214 OFFICE O/P EST MOD 30 MIN: CPT | Performed by: PHYSICIAN ASSISTANT

## 2022-01-27 PROCEDURE — 3008F BODY MASS INDEX DOCD: CPT | Performed by: PHYSICIAN ASSISTANT

## 2022-01-27 PROCEDURE — 96372 THER/PROPH/DIAG INJ SC/IM: CPT

## 2022-01-27 PROCEDURE — 1036F TOBACCO NON-USER: CPT | Performed by: PHYSICIAN ASSISTANT

## 2022-01-27 RX ORDER — CYANOCOBALAMIN 1000 UG/ML
1000 INJECTION INTRAMUSCULAR; SUBCUTANEOUS ONCE
Status: COMPLETED | OUTPATIENT
Start: 2022-01-27 | End: 2022-01-27

## 2022-01-27 RX ORDER — CYANOCOBALAMIN 1000 UG/ML
1000 INJECTION INTRAMUSCULAR; SUBCUTANEOUS ONCE
Status: CANCELLED | OUTPATIENT
Start: 2022-02-24

## 2022-01-27 RX ADMIN — CYANOCOBALAMIN 1000 MCG: 1000 INJECTION, SOLUTION INTRAMUSCULAR at 14:56

## 2022-01-27 NOTE — PROGRESS NOTES
[facility] [address] [id number]  Hematology Ambulatory Follow-Up  Suraj Chand, 1985, [id number]  1/27/2022      Assessment and Plan   1  Postsurgical malabsorption; 2  Hx of iron deficiency; 3  B12 deficiency  This is a 59-year-old female with history of gastric bypass surgery he was found to be B12 and iron deficient  Most recent labs demonstrate iron saturation above 40%  No additional IV iron infusions are noted at this time  Ferritin stable above 58 ng/dL  Patient has started on an IUD and menstrual bleeding has significantly lessened  Patient's B12 levels are borderline acceptable at 400  Patient will continue with monthly injections  Regimen:  B 12 1000 mcg IM Monthly    Patient was instructed to follow-up in the office in approximately six months with blood work prior  Patient knows to contact the office if she has any questions or concerns  - Vitamin B12; Future  - CBC and differential; Future  - Iron Panel (Includes Ferritin, Iron Sat%, Iron, and TIBC); Future  - Ferritin; Future  - Vitamin B12  - CBC and differential  - Ferritin      Patient voiced agreement and understanding to the above  Patient advised to call the Hematology/Oncology office with any questions and concerns regarding the above  Barrier(s) to care: None  The patient is able to self care  Michelle Pappas PA-C  Medical Oncology/Hematology  [address]    Subjective     Chief Complaint   Patient presents with    Follow-up     No conerns  History of present illness:  This is a 59-year-old female with history of heavy menstrual periods since a teenager who noted worsening issues with iron deficiency after donating blood in 2010   Patient recently underwent bariatric surgery in July 2020 and follow-up appointments demonstrated persistence of iron deficiency anemia and referral was made to Hematology      Patient is presently symptomatic with PICA-ice, on restful sleep, fatigue, hair loss, headaches and irritability   Patient notes that she is taking oral iron and is causing side effects such as constipation      Patient's iron studies prior to bariatric surgery demonstrate a pretty significant iron deficiency   Patient was never anemic and was asked to continue on oral iron supplementation   Since then, patient's symptoms have worsened      Patient also complains of heavy menstrual periods   Patient underwent evaluation with Gynecology  Alyssa Ramachandran had ultrasound completed that demonstrated a left ovarian cyst, likely benign without significant pathology within the gynecologic organs   Patient has only ever tried oral contraceptives to help lessen menstrual bleeding but this is not worked   Last time patient tried oral contraceptives was as a teenager  Alyssa Ramachandran is using a super plus tampon every hour during the heavy parts of her menstruation      EGD was completed in December 2019 without significant findings  Alyssa Ramachandran has never had a colonoscopy  Alyssa Ramachandran denies blood loss from her stool or her urine      2/21/20 WBC = 7 7, hemoglobin 12 5, MCV = 76, RDW 15 9, platelet count 701  4/66/56 ferritin = 6  8/12/20 WBC = 6 8, hemoglobin 12 1, hematocrit 30 2, MCV = 77, RDW 15 2, platelet count = 773  10/8/20 WBC = 4 4 hemoglobin = 11 2, MCV = 76, RDW 15 7, platelet count 596, ferritin = 7  12/4 & 12/18/2020: Feraheme x 2 doses  1/2021:  Maintenance Feraheme 510 IV q 28 days  1/5/2021:    WBC = 4 4, hemoglobin 11 8, MCV = 83, RDW = 17 4, platelet  = 399, ferritin =  91, iron saturation  = 27, B12 =  217  After maintenance infusions in February, patient was lost to follow-up      6/4/21 WBC = 5 5, hemoglobin 13 9, hematocrit 42 1, MCV 87, RDW 13 5, platelet count 532, O63= 283     8/11 through 10/7, monthly IV iron    B12 induction x4 weeks followed by monthly B12 injections      10/7/21:  TIBC = 231, iron saturation = 45%, ferritin = 99, hemoglobin = 14 0, hematocrit = 43 1, platelets = 273, WBC = 5 08  Feraheme discontinued  1/20/22 hemoglobin = 13 9, hematocrit = 42 8, ferritin = 71, TIBC = 244, B12 = 400    Interval history:  Still feeling tired  B12 injections help  Otherwise, menses have less than significantly with IUD  Patient is still passing significant clots  Patient presents with her twins today- [facility] party for 5th  Review of Systems   Constitutional: Negative for appetite change, fatigue, fever and unexpected weight change  HENT: Negative for nosebleeds  Respiratory: Negative for cough, choking and shortness of breath  Negative hemoptysis  Cardiovascular: Negative for chest pain, palpitations and leg swelling  Gastrointestinal: Negative  Negative for abdominal distention, abdominal pain, anal bleeding, blood in stool, constipation, diarrhea, nausea and vomiting  Endocrine: Negative  Negative for cold intolerance  Genitourinary: Negative  Negative for hematuria, menstrual problem, vaginal bleeding, vaginal discharge and vaginal pain  Musculoskeletal: Negative  Negative for arthralgias, myalgias, neck pain and neck stiffness  Skin: Negative  Negative for color change, pallor and rash  Allergic/Immunologic: Negative  Negative for immunocompromised state  Neurological: Negative  Negative for weakness and headaches  Hematological: Negative for adenopathy  Does not bruise/bleed easily  All other systems reviewed and are negative        Patient Active Problem List   Diagnosis    Peripheral edema    Left wrist tendonitis    Multiple benign nevi    Acute non-recurrent pansinusitis    Class 3 severe obesity without serious comorbidity with body mass index (BMI) of 40 0 to 44 9 in adult [facility])    Atypical chest pain    Influenza vaccine administered    PCOS (polycystic ovarian syndrome)    Drug reaction    Iron deficiency anemia secondary to inadequate dietary iron intake    Surgical follow-up care    Postsurgical malabsorption    Vitamin D deficiency    S/P gastric bypass    Class 1 obesity due to excess calories without serious comorbidity with body mass index (BMI) of 33 0 to 33 9 in adult    Iron deficiency    B12 deficiency    Pain of upper abdomen    Exercise-induced asthma    S/P tubal ligation    PONV (postoperative nausea and vomiting)    Symptomatic cholelithiasis     Past Medical History:   Diagnosis Date    Allergic rhinitis     Anemia     ok currently    Anxiety     Asthma     activity induced    Benign neoplasm of skin of trunk     Morbid obesity with BMI of 40 0-44 9, adult (HCC)     Polycystic ovary syndrome     Postgastrectomy malabsorption     Skin tag     Sleep apnea     Tinea versicolor      Past Surgical History:   Procedure Laterality Date    CERVICAL CERCLAGE       SECTION  2017    PARAESOPHAGEAL HERNIA REPAIR N/A 2020    Procedure: REPAIR HERNIA PARAESOPHAGEAL  LAPAROSCOPIC;  Surgeon: Darroll Mcardle, MD;  Location: [address] [place] [address];  Service: Bariatrics    CT LAP GASTRIC BYPASS/JENNIFER-EN-Y N/A 2020    Procedure: BYPASS GASTRIC  JENNIFER-EN-Y LAPAROSCOPIC;  Surgeon: Darroll Mcardle, MD;  Location: [address] [place] [address];  Service: Bariatrics    CT LAP,CHOLECYSTECTOMY N/A 2021    Procedure: CHOLECYSTECTOMY LAPAROSCOPIC;  Surgeon: Paulina Montalvo MD;  Location: [address] [place] [address];  Service: General    TONSILLECTOMY      TUBAL LIGATION       Family History   Problem Relation Age of Onset    Diabetes Mother     Heart disease Mother         CHF    Hypertension Mother     Kidney cancer Mother         2020    Cancer Father         kidney    Aneurysm Father         AAA    COPD Father     Kidney disease Father     Kidney cancer Father             No Known Problems Sister     Cancer Maternal Grandmother         bladder    Cancer Paternal Grandfather         brain    Stroke Paternal Grandfather  No Known Problems Sister     No Known Problems Sister      Social History     Socioeconomic History    Marital status:      Spouse name: None    Number of children: None    Years of education: None    Highest education level: None   Occupational History    None   Tobacco Use    Smoking status: Never Smoker    Smokeless tobacco: Never Used   Vaping Use    Vaping Use: Never used   Substance and Sexual Activity    Alcohol use: Not Currently     Comment: rarely    Drug use: No    Sexual activity: Yes   Other Topics Concern    None   Social History Narrative    None     Social Determinants of Health     Financial Resource Strain: Not on file   Food Insecurity: Not on file   Transportation Needs: Not on file   Physical Activity: Not on file   Stress: Not on file   Social Connections: Not on file   Intimate Partner Violence: Not on file   Housing Stability: Not on file       Current Outpatient Medications:     acetaminophen (TYLENOL) 325 mg tablet, Take 650 mg by mouth every 6 (six) hours as needed for mild pain, Disp: , Rfl:     CALCIUM CITRATE PO, Take by mouth 3 (three) times a day, Disp: , Rfl:     cyclobenzaprine (FLEXERIL) 10 mg tablet, Take 1 tablet (10 mg total) by mouth 3 (three) times a day as needed for muscle spasms, Disp: 30 tablet, Rfl: 0    escitalopram (LEXAPRO) 20 mg tablet, Take 1 tablet (20 mg total) by mouth daily, Disp: 90 tablet, Rfl: 3    furosemide (LASIX) 20 mg tablet, TAKE 1 TABLET DAILY AS NEEDED FOR EDEMA, Disp: 30 tablet, Rfl: 0    levonorgestrel (MIRENA) 20 MCG/24HR IUD, 1 each by Intrauterine route, Disp: , Rfl:     Multiple Vitamin (multivitamin) tablet, Take 1 tablet by mouth daily Bariatric one a day, Disp: , Rfl:     oxyCODONE-acetaminophen (PERCOCET) 5-325 mg per tablet, Take 1 tablet by mouth every 4 (four) hours as needed for moderate pain for up to 6 dosesMax Daily Amount: 6 tablets, Disp: 6 tablet, Rfl: 0  Allergies   Allergen Reactions    Valtrex [Valacyclovir] Swelling    Amoxicillin      Tingling of mouth        Objective   /70 (BP Location: Right arm, Patient Position: Sitting, Cuff Size: Adult)   Pulse 72   Temp 97 6 °F (36 4 °C)   Resp 16   Ht 5' 6" (1 676 m)   Wt 83 9 kg (185 lb)   SpO2 98%   BMI 29 86 kg/m²    Physical Exam  Constitutional:       General: She is not in acute distress  Appearance: She is well-developed  HENT:      Head: Normocephalic and atraumatic  Eyes:      General: No scleral icterus  Pupils: Pupils are equal, round, and reactive to light  Cardiovascular:      Rate and Rhythm: Normal rate and regular rhythm  Heart sounds: No murmur heard  Pulmonary:      Effort: Pulmonary effort is normal  No respiratory distress  Skin:     General: Skin is warm  Coloration: Skin is not pale  Findings: No rash  Neurological:      Mental Status: She is alert and oriented to person, place, and time  Psychiatric:         Thought Content:  Thought content normal          Result Review  Labs:  Appointment on 01/20/2022   Component Date Value Ref Range Status    WBC 01/20/2022 5 54  4 31 - 10 16 Thousand/uL Final    RBC 01/20/2022 4 88  3 81 - 5 12 Million/uL Final    Hemoglobin 01/20/2022 13 9  11 5 - 15 4 g/dL Final    Hematocrit 01/20/2022 42 8  34 8 - 46 1 % Final    MCV 01/20/2022 88  82 - 98 fL Final    MCH 01/20/2022 28 5  26 8 - 34 3 pg Final    MCHC 01/20/2022 32 5  31 4 - 37 4 g/dL Final    RDW 01/20/2022 13 4  11 6 - 15 1 % Final    MPV 01/20/2022 11 9  8 9 - 12 7 fL Final    Platelets 09/99/6361 176  149 - 390 Thousands/uL Final    nRBC 01/20/2022 0  /100 WBCs Final    Neutrophils Relative 01/20/2022 67  43 - 75 % Final    Immat GRANS % 01/20/2022 0  0 - 2 % Final    Lymphocytes Relative 01/20/2022 23  14 - 44 % Final    Monocytes Relative 01/20/2022 6  4 - 12 % Final    Eosinophils Relative 01/20/2022 3  0 - 6 % Final    Basophils Relative 01/20/2022 1  0 - 1 % Final    Neutrophils Absolute 01/20/2022 3 78  1 85 - 7 62 Thousands/µL Final    Immature Grans Absolute 01/20/2022 0 01  0 00 - 0 20 Thousand/uL Final    Lymphocytes Absolute 01/20/2022 1 25  0 60 - 4 47 Thousands/µL Final    Monocytes Absolute 01/20/2022 0 33  0 17 - 1 22 Thousand/µL Final    Eosinophils Absolute 01/20/2022 0 14  0 00 - 0 61 Thousand/µL Final    Basophils Absolute 01/20/2022 0 03  0 00 - 0 10 Thousands/µL Final    Vitamin B-12 01/20/2022 449  100 - 900 pg/mL Final    Iron Saturation 01/20/2022 46  15 - 50 % Final    TIBC 01/20/2022 244* 250 - 450 ug/dL Final    Iron 01/20/2022 113  50 - 170 ug/dL Final    Patients treated with metal-binding drugs (ie  Deferoxamine) may have depressed iron values   Ferritin 01/20/2022 71  8 - 388 ng/mL Final       Please note: This report has been generated by a voice recognition software system  Therefore there may be syntax, spelling, and/or grammatical errors  Please call if you have any questions

## 2022-02-23 ENCOUNTER — TELEPHONE (OUTPATIENT)
Dept: BARIATRICS | Facility: CLINIC | Age: 37
End: 2022-02-23

## 2022-02-23 NOTE — TELEPHONE ENCOUNTER
-s/p Teodoro-En-Y Gastric Bypass and repair of paraesophageal hernia repair with Dr Steve Johansen on 07/20/20  She is s/p lap chitra on 08/04/21 with Dr Epifanio Raygoza  She reports onset of pain and bulge on left side of umbilicus (not on an incision), freely reducible, no fevers, chills, no N/V  Likely umbilical or incisional hernia, currently stable and reducible  She agrees to call Dr Epifanio Raygoza to be evaluated by him outpatient  She understands when to report to the ED if she develops severe pain, N/V, unable to tolerate po intake  She is having normal BM's and otherwise feeling great

## 2022-02-24 ENCOUNTER — HOSPITAL ENCOUNTER (OUTPATIENT)
Dept: INFUSION CENTER | Facility: HOSPITAL | Age: 37
Discharge: HOME | End: 2022-02-24
Attending: INTERNAL MEDICINE
Payer: COMMERCIAL

## 2022-02-24 ENCOUNTER — CONSULT (OUTPATIENT)
Dept: SURGERY | Facility: CLINIC | Age: 37
End: 2022-02-24
Payer: COMMERCIAL

## 2022-02-24 VITALS
HEIGHT: 66 IN | SYSTOLIC BLOOD PRESSURE: 120 MMHG | TEMPERATURE: 96.6 F | OXYGEN SATURATION: 98 % | WEIGHT: 186 LBS | DIASTOLIC BLOOD PRESSURE: 70 MMHG | HEART RATE: 64 BPM | BODY MASS INDEX: 29.89 KG/M2

## 2022-02-24 VITALS — TEMPERATURE: 97.5 F

## 2022-02-24 DIAGNOSIS — R10.32 ABDOMINAL WALL PAIN IN LEFT LOWER QUADRANT: Primary | ICD-10-CM

## 2022-02-24 DIAGNOSIS — K91.2 POSTSURGICAL MALABSORPTION: ICD-10-CM

## 2022-02-24 DIAGNOSIS — E61.1 IRON DEFICIENCY: Primary | ICD-10-CM

## 2022-02-24 PROCEDURE — 3008F BODY MASS INDEX DOCD: CPT | Performed by: SURGERY

## 2022-02-24 PROCEDURE — 1036F TOBACCO NON-USER: CPT | Performed by: SURGERY

## 2022-02-24 PROCEDURE — 99213 OFFICE O/P EST LOW 20 MIN: CPT | Performed by: SURGERY

## 2022-02-24 PROCEDURE — 96372 THER/PROPH/DIAG INJ SC/IM: CPT

## 2022-02-24 RX ORDER — CYANOCOBALAMIN 1000 UG/ML
1000 INJECTION INTRAMUSCULAR; SUBCUTANEOUS ONCE
Status: CANCELLED | OUTPATIENT
Start: 2022-03-24

## 2022-02-24 RX ORDER — CYANOCOBALAMIN 1000 UG/ML
1000 INJECTION INTRAMUSCULAR; SUBCUTANEOUS ONCE
Status: COMPLETED | OUTPATIENT
Start: 2022-02-24 | End: 2022-02-24

## 2022-02-24 RX ADMIN — CYANOCOBALAMIN 1000 MCG: 1000 INJECTION, SOLUTION INTRAMUSCULAR at 14:39

## 2022-02-24 NOTE — PROGRESS NOTES
[facility] History and Physical Note:    Assessment:   abdominal wall pain left lower quadrant  No hernia on physical exam    Pertinent labs reviewed   I reviewed the BMP from to 2021  I reviewed the CBC from 2022  Pertinent images and available reads personally reviewed  Pertinent notes reviewed    I reviewed the note from Dr Benji Funes in hematology dated 2022  I reviewed the note by Dr Maximiliano Garcia dated 2021    Plan:   CT abdomen pelvis without contrast to evaluate for possible ventral hernia  follow-up after CT scan  Chief Complaint:   "I think I might have a hernia"  HPI   patient is a 20-year-old woman who reports an intermittent bulge and discomfort in the left lower quadrant of her abdomen with certain activities  She would like evaluation for possible ventral hernia  No bowel or bladder obstructive complaints  No chronic    Patient underwent Jennifer-en-Y gastric bypass in 2020 (lost approx 75 lb, BMI now 30)  She is also followed by hematology for her iron deficiency anemia and B12 deficiency which  are under control on her current treatment  She also underwent a laparoscopic cholecystectomy 2021 which was uneventful        PMH:  Past Medical History:   Diagnosis Date    Allergic rhinitis     Anemia     ok currently    Anxiety     Asthma     activity induced    Benign neoplasm of skin of trunk     Morbid obesity with BMI of 40 0-44 9, adult (HCC)     Polycystic ovary syndrome     Postgastrectomy malabsorption     Skin tag     Sleep apnea     Tinea versicolor        PSH:  Past Surgical History:   Procedure Laterality Date    CERVICAL CERCLAGE       SECTION  2017    PARAESOPHAGEAL HERNIA REPAIR N/A 2020    Procedure: REPAIR HERNIA PARAESOPHAGEAL  LAPAROSCOPIC;  Surgeon: Regina Gimenez MD;  Location[facility];  Service: Bariatrics    MS LAP GASTRIC BYPASS/JENNIFER-EN-Y N/A 2020 Procedure: BYPASS GASTRIC  JENNIFER-EN-Y LAPAROSCOPIC;  Surgeon: Regina Gimenez MD;  Location: [address] [place] [address];  Service: Denilson DONIS,CHOLECYSTECTOMY N/A 8/4/2021    Procedure: CHOLECYSTECTOMY LAPAROSCOPIC;  Surgeon: Danna Olmedo MD;  Location: [address] [place] [address];  Service: General    TONSILLECTOMY      TUBAL LIGATION  2017       Home Meds:  Current Outpatient Medications on File Prior to Visit   Medication Sig Dispense Refill    acetaminophen (TYLENOL) 325 mg tablet Take 650 mg by mouth every 6 (six) hours as needed for mild pain      CALCIUM CITRATE PO Take by mouth 3 (three) times a day      cyclobenzaprine (FLEXERIL) 10 mg tablet Take 1 tablet (10 mg total) by mouth 3 (three) times a day as needed for muscle spasms 30 tablet 0    escitalopram (LEXAPRO) 20 mg tablet Take 1 tablet (20 mg total) by mouth daily 90 tablet 3    furosemide (LASIX) 20 mg tablet TAKE 1 TABLET DAILY AS NEEDED FOR EDEMA 30 tablet 0    levonorgestrel (MIRENA) 20 MCG/24HR IUD 1 each by Intrauterine route      Multiple Vitamin (multivitamin) tablet Take 1 tablet by mouth daily Bariatric one a day      oxyCODONE-acetaminophen (PERCOCET) 5-325 mg per tablet Take 1 tablet by mouth every 4 (four) hours as needed for moderate pain for up to 6 dosesMax Daily Amount: 6 tablets 6 tablet 0     No current facility-administered medications on file prior to visit  Allergies:   Allergies   Allergen Reactions    Valtrex [Valacyclovir] Swelling    Amoxicillin      Tingling of mouth        Social Hx:  Social History     Socioeconomic History    Marital status:      Spouse name: Not on file    Number of children: Not on file    Years of education: Not on file    Highest education level: Not on file   Occupational History    Not on file   Tobacco Use    Smoking status: Never Smoker    Smokeless tobacco: Never Used   Vaping Use    Vaping Use: Never used   Substance and Sexual Activity    Alcohol use: Not Currently Comment: rarely    Drug use: No    Sexual activity: Yes   Other Topics Concern    Not on file   Social History Narrative    Not on file     Social Determinants of Health     Financial Resource Strain: Not on file   Food Insecurity: Not on file   Transportation Needs: Not on file   Physical Activity: Not on file   Stress: Not on file   Social Connections: Not on file   Intimate Partner Violence: Not on file   Housing Stability: Not on file        Family Hx:    Family History   Problem Relation Age of Onset    Diabetes Mother     Heart disease Mother         CHF    Hypertension Mother     Kidney cancer Mother         5/2020    Cancer Father         kidney    Aneurysm Father         AAA    COPD Father     Kidney disease Father     Kidney cancer Father         2010    No Known Problems Sister     Cancer Maternal Grandmother         bladder    Cancer Paternal Grandfather         brain    Stroke Paternal Grandfather     No Known Problems Sister     No Known Problems Sister          Review of Systems   Constitutional: Negative  HENT: Negative  Respiratory: Negative  Cardiovascular: Negative  Gastrointestinal:         See HPI past medical history   Endocrine:         B12 deficient under treatment   Hematological:          Anemia under treatment       /70 (BP Location: Right arm, Patient Position: Sitting, Cuff Size: Standard)   Pulse 64   Temp (!) 96 6 °F (35 9 °C) (Core)   Ht 5' 6" (1 676 m)   Wt 84 4 kg (186 lb)   LMP 02/10/2022   SpO2 98%   BMI 30 02 kg/m²     Physical Exam  Vitals reviewed  Constitutional:       Appearance: She is obese  HENT:      Head: Normocephalic  Mouth/Throat:      Pharynx: Oropharynx is clear  Cardiovascular:      Rate and Rhythm: Normal rate and regular rhythm  Pulmonary:      Effort: Pulmonary effort is normal  No respiratory distress  Breath sounds: Normal breath sounds  Abdominal:      General: There is no distension  Palpations: Abdomen is soft  There is no mass  Tenderness: There is no abdominal tenderness  Hernia: No hernia is present  Musculoskeletal:         General: Normal range of motion  Skin:     General: Skin is warm and dry  Neurological:      General: No focal deficit present  Mental Status: She is alert and oriented to person, place, and time  Pertinent labs reviewed   I reviewed the BMP from to December 2021      I reviewed the CBC from 20 January 2022  Pertinent images and available reads personally reviewed    Pertinent notes reviewed    I reviewed the note from Dr Dionne Flowers in hematology dated 27 January 2022  I reviewed the note by Dr Walker Farr dated 2 December 2021     Remi Lesches, MD [address] [facility]  [phone]

## 2022-03-03 ENCOUNTER — HOSPITAL ENCOUNTER (OUTPATIENT)
Dept: RADIOLOGY | Facility: HOSPITAL | Age: 37
Discharge: HOME | End: 2022-03-03
Attending: SURGERY
Payer: COMMERCIAL

## 2022-03-03 DIAGNOSIS — R10.32 ABDOMINAL WALL PAIN IN LEFT LOWER QUADRANT: ICD-10-CM

## 2022-03-03 PROCEDURE — 74176 CT ABD & PELVIS W/O CONTRAST: CPT

## 2022-03-03 PROCEDURE — G1004 CDSM NDSC: HCPCS

## 2022-03-10 ENCOUNTER — TELEPHONE (OUTPATIENT)
Dept: SURGERY | Facility: CLINIC | Age: 37
End: 2022-03-10

## 2022-03-10 NOTE — TELEPHONE ENCOUNTER
I called and spoke to Marisol  And informed her of the results her abdomen pelvis CT scan  There is nothing on the CT scan to account for her left lower quadrant pain complaints  As such, I stated that there is no indication for surgery

## 2022-03-13 ENCOUNTER — OFFICE VISIT (OUTPATIENT)
Dept: URGENT CARE | Facility: CLINIC | Age: 37
End: 2022-03-13
Payer: COMMERCIAL

## 2022-03-13 VITALS — RESPIRATION RATE: 16 BRPM | HEART RATE: 59 BPM | TEMPERATURE: 98.2 F | OXYGEN SATURATION: 99 %

## 2022-03-13 DIAGNOSIS — R68.89 FLU-LIKE SYMPTOMS: Primary | ICD-10-CM

## 2022-03-13 PROCEDURE — 99213 OFFICE O/P EST LOW 20 MIN: CPT | Performed by: PHYSICIAN ASSISTANT

## 2022-03-13 PROCEDURE — 87636 SARSCOV2 & INF A&B AMP PRB: CPT | Performed by: PHYSICIAN ASSISTANT

## 2022-03-13 NOTE — PROGRESS NOTES
[facility] Now        NAME: Josh Payne is a 43 y o  female  : 1985    MRN: [id number]  DATE: 2022  TIME: 4:29 PM    Assessment and Plan   Flu-like symptoms [R68 89]  1  Flu-like symptoms  Covid/Flu-Office Collect         Patient Instructions   Acute Upper Respiratory Tract Infection vs influenza:   -COVID and influenza testing sent out   -There is no sign of bacterial infection on exam at this time  This is likely a viral illness  Advised supportive measures   -Fluticasone Nasal Spray for PND and congestion  -You can use OTC zyrtec or claritin  You can OTC mucinex  -Use a humidifier next to your bed  Take steam showers  -You can take Advil or Tylenol for fever or pain  -Stay very well hydrated and rest   -If your symptoms persist or worsen follow up immediately with your PCP      Follow up with PCP in 3-5 days  Proceed to  ER if symptoms worsen  Chief Complaint     Chief Complaint   Patient presents with    Sore Throat     body aches, diarrhea, sore throat         History of Present Illness       The patient presents today for myalgias, fatigue, diarrhea and sore throat  Her daughter is positive for influenza A  She has no fever  No abdominal pain, nausea, vomiting  No dizziness or weakness  No cough, dyspnea, wheezing, cp, palpitations  She is vaccinated for influenza and COVID  She has good PO intake  No OTC measures attempted  No recent travel  Review of Systems   Review of Systems   Constitutional: Positive for fatigue  Negative for activity change, appetite change, chills, diaphoresis and fever  HENT: Positive for congestion and sore throat  Negative for ear discharge, ear pain, facial swelling, hearing loss, postnasal drip, rhinorrhea, sinus pressure, sinus pain, tinnitus, trouble swallowing and voice change  Eyes: Negative for visual disturbance  Respiratory: Negative for apnea, cough, chest tightness, shortness of breath, wheezing and stridor  Cardiovascular: Negative for chest pain, palpitations and leg swelling  Gastrointestinal: Positive for diarrhea  Negative for abdominal distention, abdominal pain, constipation, nausea and vomiting  Genitourinary: Negative for decreased urine volume  Musculoskeletal: Positive for myalgias  Negative for arthralgias, joint swelling, neck pain and neck stiffness  Skin: Negative for rash  Allergic/Immunologic: Negative for immunocompromised state  Neurological: Positive for headaches  Negative for dizziness, weakness, light-headedness and numbness  Hematological: Negative for adenopathy           Current Medications       Current Outpatient Medications:     escitalopram (LEXAPRO) 20 mg tablet, Take 1 tablet (20 mg total) by mouth daily, Disp: 90 tablet, Rfl: 3    furosemide (LASIX) 20 mg tablet, TAKE 1 TABLET DAILY AS NEEDED FOR EDEMA, Disp: 30 tablet, Rfl: 0    levonorgestrel (MIRENA) 20 MCG/24HR IUD, 1 each by Intrauterine route, Disp: , Rfl:     Multiple Vitamin (multivitamin) tablet, Take 1 tablet by mouth daily Bariatric one a day, Disp: , Rfl:     acetaminophen (TYLENOL) 325 mg tablet, Take 650 mg by mouth every 6 (six) hours as needed for mild pain (Patient not taking: Reported on 3/13/2022 ), Disp: , Rfl:     CALCIUM CITRATE PO, Take by mouth 3 (three) times a day (Patient not taking: Reported on 2/24/2022 ), Disp: , Rfl:     cyclobenzaprine (FLEXERIL) 10 mg tablet, Take 1 tablet (10 mg total) by mouth 3 (three) times a day as needed for muscle spasms (Patient not taking: Reported on 3/13/2022 ), Disp: 30 tablet, Rfl: 0    oxyCODONE-acetaminophen (PERCOCET) 5-325 mg per tablet, Take 1 tablet by mouth every 4 (four) hours as needed for moderate pain for up to 6 dosesMax Daily Amount: 6 tablets (Patient not taking: Reported on 2/24/2022 ), Disp: 6 tablet, Rfl: 0    Current Allergies     Allergies as of 03/13/2022 - Reviewed 03/13/2022   Allergen Reaction Noted    Valtrex [valacyclovir] Swelling 2019    Amoxicillin  2018            The following portions of the patient's history were reviewed and updated as appropriate: allergies, current medications, past family history, past medical history, past social history, past surgical history and problem list      Past Medical History:   Diagnosis Date    Allergic rhinitis     Anemia     ok currently    Anxiety     Asthma     activity induced    Benign neoplasm of skin of trunk     Morbid obesity with BMI of 40 0-44 9, adult (HCC)     Polycystic ovary syndrome     Postgastrectomy malabsorption     Skin tag     Sleep apnea     Tinea versicolor        Past Surgical History:   Procedure Laterality Date    CERVICAL CERCLAGE       SECTION  2017    PARAESOPHAGEAL HERNIA REPAIR N/A 2020    Procedure: REPAIR HERNIA PARAESOPHAGEAL  LAPAROSCOPIC;  Surgeon: Savannah Mcfarlane MD;  Location: [address] [place] [address];  Service: Bariatrics    AR LAP GASTRIC BYPASS/JENNIFER-EN-Y N/A 2020    Procedure: BYPASS GASTRIC  JENNIFER-EN-Y LAPAROSCOPIC;  Surgeon: Savannah Mcfarlane MD;  Location: [address] [place] [address];  Service: Bariatrics    AR LAP,CHOLECYSTECTOMY N/A 2021    Procedure: CHOLECYSTECTOMY LAPAROSCOPIC;  Surgeon: Maggie Bailon MD;  Location: [address] [place] [address];  Service: General    TONSILLECTOMY      TUBAL LIGATION         Family History   Problem Relation Age of Onset    Diabetes Mother     Heart disease Mother         CHF    Hypertension Mother     Kidney cancer Mother         2020    Cancer Father         kidney    Aneurysm Father         AAA    COPD Father     Kidney disease Father     Kidney cancer Father             No Known Problems Sister     Cancer Maternal Grandmother         bladder    Cancer Paternal Grandfather         brain    Stroke Paternal Grandfather     No Known Problems Sister     No Known Problems Sister          Medications have been verified          Objective   There were no vitals taken for this visit  No LMP recorded  Physical Exam     Physical Exam  Vitals and nursing note reviewed  Constitutional:       General: She is not in acute distress  Appearance: She is well-developed  She is not diaphoretic  HENT:      Head: Normocephalic and atraumatic  Right Ear: Hearing, tympanic membrane, ear canal and external ear normal       Left Ear: Hearing, tympanic membrane, ear canal and external ear normal       Nose: Nose normal  No mucosal edema, congestion or rhinorrhea  Right Sinus: No maxillary sinus tenderness or frontal sinus tenderness  Left Sinus: No maxillary sinus tenderness or frontal sinus tenderness  Mouth/Throat:      Lips: Pink  Mouth: Mucous membranes are moist       Pharynx: Uvula midline  No pharyngeal swelling, oropharyngeal exudate, posterior oropharyngeal erythema or uvula swelling  Tonsils: No tonsillar exudate or tonsillar abscesses  1+ on the right  1+ on the left  Cardiovascular:      Rate and Rhythm: Normal rate and regular rhythm  Heart sounds: S1 normal and S2 normal  Heart sounds not distant  No murmur heard  No friction rub  No gallop  No S3 or S4 sounds  Pulmonary:      Effort: Pulmonary effort is normal  No tachypnea, bradypnea, accessory muscle usage or respiratory distress  Breath sounds: Normal breath sounds and air entry  No stridor, decreased air movement or transmitted upper airway sounds  No decreased breath sounds, wheezing, rhonchi or rales  Abdominal:      General: Abdomen is flat  Bowel sounds are normal       Palpations: Abdomen is soft  Tenderness: There is no abdominal tenderness  There is no guarding or rebound  Musculoskeletal:      Cervical back: Normal range of motion and neck supple  Lymphadenopathy:      Cervical: No cervical adenopathy  Neurological:      Mental Status: She is alert and oriented to person, place, and time     Psychiatric:         Behavior: Behavior normal

## 2022-03-13 NOTE — PATIENT INSTRUCTIONS
Influenza   AMBULATORY CARE:   Influenza  (the flu) is an infection caused by the influenza virus  The flu is easily spread when an infected person coughs, sneezes, or has close contact with others  You may be able to spread the flu to others for 1 week or longer after signs or symptoms appear  Common signs and symptoms include the following:   · Fever and chills    · Headaches, body aches, and muscle or joint pain    · Cough, runny nose, and sore throat    · Loss of appetite, nausea, vomiting, or diarrhea    · Tiredness    · Trouble breathing    Call your local emergency number (911 in the [address]) if:   · You have trouble breathing, and your lips look purple or blue  · You have a seizure  Seek care immediately if:   · You are dizzy, or you are urinating less or not at all  · You have a headache with a stiff neck, and you feel tired or confused  · You have new pain or pressure in your chest     · Your symptoms, such as shortness of breath, vomiting, or diarrhea, get worse  · Your symptoms, such as fever and coughing, seem to get better, but then get worse  Call your doctor if:   · You have new muscle pain or weakness  · You have questions or concerns about your condition or care  Treatment for influenza  may include any of the following:  · Acetaminophen  decreases pain and fever  It is available without a doctor's order  Ask how much to take and how often to take it  Follow directions  Read the labels of all other medicines you are using to see if they also contain acetaminophen, or ask your doctor or pharmacist  Acetaminophen can cause liver damage if not taken correctly  Do not use more than 4 grams (4,000 milligrams) total of acetaminophen in one day  · NSAIDs , such as ibuprofen, help decrease swelling, pain, and fever  This medicine is available with or without a doctor's order  NSAIDs can cause stomach bleeding or kidney problems in certain people   If you take blood thinner medicine, always ask your healthcare provider if NSAIDs are safe for you  Always read the medicine label and follow directions  · Antivirals  help fight a viral infection  Manage your symptoms:   · Rest  as much as you can to help you recover  · Drink liquids as directed  to help prevent dehydration  Ask how much liquid to drink each day and which liquids are best for you  Prevent the spread of germs:       · Wash your hands often  Wash your hands several times each day  Wash after you use the bathroom, change a child's diaper, and before you prepare or eat food  Use soap and water every time  Rub your soapy hands together, lacing your fingers  Wash the front and back of your hands, and in between your fingers  Use the fingers of one hand to scrub under the fingernails of the other hand  Wash for at least 20 seconds  Rinse with warm, running water for several seconds  Then dry your hands with a clean towel or paper towel  Use hand  that contains alcohol if soap and water are not available  Do not touch your eyes, nose, or mouth without washing your hands first          · Cover a sneeze or cough  Use a tissue that covers your mouth and nose  Throw the tissue away in a trash can right away  Use the bend of your arm if a tissue is not available  Wash your hands well with soap and water or use a hand   · Stay away from others while you are sick  Avoid crowds as much as possible  · Ask about vaccines you may need  Talk to your healthcare provider about your vaccine history  He or she will tell you which vaccines you need, and when to get them  ? Get the influenza (flu) vaccine as soon as it is available  Flu viruses change, so it is important to get a flu vaccine every year  ? Get the pneumonia vaccine if recommended  This vaccine is usually recommended every 5 years  Your provider will tell you when to get this vaccine, if needed      Follow up with your doctor as directed: Write down your questions so you remember to ask them during your visits  © Copyright [employer] 2022 Information is for End User's use only and may not be sold, redistributed or otherwise used for commercial purposes  All illustrations and images included in CareNotes® are the copyrighted property of A D A M , Inc  or Brian Pena  The above information is an  only  It is not intended as medical advice for individual conditions or treatments  Talk to your doctor, nurse or pharmacist before following any medical regimen to see if it is safe and effective for you  Upper Respiratory Infection   WHAT YOU NEED TO KNOW:   An upper respiratory infection is also called a cold  It can affect your nose, throat, ears, and sinuses  Cold symptoms are usually worst for the first 3 to 5 days  Most people get better in 7 to 14 days  You may continue to cough for 2 to 3 weeks  Colds are caused by viruses and do not get better with antibiotics  DISCHARGE INSTRUCTIONS:   Call your local emergency number (911 in the [address] [place] [address],[address]) if:   · You have chest pain or trouble breathing  Return to the emergency department if:   · You have a fever over 102ºF (39ºC)  Call your doctor if:   · You have a low fever  · Your sore throat gets worse or you see white or yellow spots in your throat  · Your symptoms get worse after 3 to 5 days or are not better in 14 days  · You have a rash anywhere on your skin  · You have large, tender lumps in your neck  · You have thick, green, or yellow drainage from your nose  · You cough up thick yellow, green, or bloody mucus  · You have a bad earache  · You have questions or concerns about your condition or care  Medicines: You may need any of the following:  · Decongestants  help reduce nasal congestion and help you breathe more easily  If you take decongestant pills, they may make you feel restless or cause problems with your sleep   Do not use decongestant sprays for more than a few days  · Cough suppressants  help reduce coughing  Ask your healthcare provider which type of cough medicine is best for you  · NSAIDs , such as ibuprofen, help decrease swelling, pain, and fever  NSAIDs can cause stomach bleeding or kidney problems in certain people  If you take blood thinner medicine, always ask your healthcare provider if NSAIDs are safe for you  Always read the medicine label and follow directions  · Acetaminophen  decreases pain and fever  It is available without a doctor's order  Ask how much to take and how often to take it  Follow directions  Read the labels of all other medicines you are using to see if they also contain acetaminophen, or ask your doctor or pharmacist  Acetaminophen can cause liver damage if not taken correctly  Do not use more than 4 grams (4,000 milligrams) total of acetaminophen in one day  · Take your medicine as directed  Contact your healthcare provider if you think your medicine is not helping or if you have side effects  Tell him or her if you are allergic to any medicine  Keep a list of the medicines, vitamins, and herbs you take  Include the amounts, and when and why you take them  Bring the list or the pill bottles to follow-up visits  Carry your medicine list with you in case of an emergency  Self-care:   · Rest as much as possible  Slowly start to do more each day  · Drink more liquids as directed  Liquids will help thin and loosen mucus so you can cough it up  Liquids will also help prevent dehydration  Liquids that help prevent dehydration include water, fruit juice, and broth  Do not drink liquids that contain caffeine  Caffeine can increase your risk for dehydration  Ask your healthcare provider how much liquid to drink each day  · Soothe a sore throat  Gargle with warm salt water  Make salt water by dissolving ¼ teaspoon salt in 1 cup warm water   You may also suck on hard candy or throat lozenges  You may use a sore throat spray  · Use a humidifier or vaporizer  Use a cool mist humidifier or a vaporizer to increase air moisture in your home  This may make it easier for you to breathe and help decrease your cough  · Use saline nasal drops as directed  These help relieve congestion  · Apply petroleum-based jelly around the outside of your nostrils  This can decrease irritation from blowing your nose  · Do not smoke  Nicotine and other chemicals in cigarettes and cigars can make your symptoms worse  They can also cause infections such as bronchitis or pneumonia  Ask your healthcare provider for information if you currently smoke and need help to quit  E-cigarettes or smokeless tobacco still contain nicotine  Talk to your healthcare provider before you use these products  Prevent a cold:   · Wash your hands often  Use soap and water every time you wash your hands  Rub your soapy hands together, lacing your fingers  Use the fingers of one hand to scrub under the nails of the other hand  Wash for at least 20 seconds  Rinse with warm, running water for several seconds  Then dry your hands  Use germ-killing gel if soap and water are not available  Do not touch your eyes or mouth without washing your hands first          · Cover a sneeze or cough  Use a tissue that covers your mouth and nose  Put the used tissue in the trash right away  Use the bend of your arm if a tissue is not available  Wash your hands well with soap and water or use a hand   Do not stand close to anyone who is sneezing or coughing  · Try to stay away from others while you are sick  This is especially important during the first 2 to 3 days when the virus is more easily spread  Wait until a fever, cough, or other symptoms are gone before you return to work or other regular activities  · Do not share items while you are sick  This includes food, drinks, eating utensils, and dishes      Follow up with your doctor as directed:  Write down your questions so you remember to ask them during your visits  © Copyright [employer] 2022 Information is for End User's use only and may not be sold, redistributed or otherwise used for commercial purposes  All illustrations and images included in CareNotes® are the copyrighted property of [employer] D A M , Inc  or Brian Prado   The above information is an  only  It is not intended as medical advice for individual conditions or treatments  Talk to your doctor, nurse or pharmacist before following any medical regimen to see if it is safe and effective for you  Acute Upper Respiratory Tract Infection vs influenza:   -COVID and influenza testing sent out   -There is no sign of bacterial infection on exam at this time  This is likely a viral illness  Advised supportive measures   -Fluticasone Nasal Spray for PND and congestion  -You can use OTC zyrtec or claritin  You can OTC mucinex  -Use a humidifier next to your bed  Take steam showers     -You can take Advil or Tylenol for fever or pain  -Stay very well hydrated and rest   -If your symptoms persist or worsen follow up immediately with your PCP

## 2022-03-14 LAB
FLUAV RNA RESP QL NAA+PROBE: POSITIVE
FLUBV RNA RESP QL NAA+PROBE: NEGATIVE
SARS-COV-2 RNA RESP QL NAA+PROBE: NEGATIVE

## 2022-03-17 ENCOUNTER — OFFICE VISIT (OUTPATIENT)
Dept: FAMILY MEDICINE CLINIC | Facility: CLINIC | Age: 37
End: 2022-03-17
Payer: COMMERCIAL

## 2022-03-17 VITALS
BODY MASS INDEX: 30.05 KG/M2 | RESPIRATION RATE: 18 BRPM | DIASTOLIC BLOOD PRESSURE: 78 MMHG | HEIGHT: 66 IN | TEMPERATURE: 98.4 F | SYSTOLIC BLOOD PRESSURE: 108 MMHG | OXYGEN SATURATION: 98 % | HEART RATE: 65 BPM | WEIGHT: 187 LBS

## 2022-03-17 DIAGNOSIS — Z23 ENCOUNTER FOR IMMUNIZATION: ICD-10-CM

## 2022-03-17 DIAGNOSIS — R63.5 WEIGHT GAIN STATUS POST GASTRIC BYPASS: Primary | ICD-10-CM

## 2022-03-17 DIAGNOSIS — F41.9 ANXIETY: ICD-10-CM

## 2022-03-17 DIAGNOSIS — Z11.59 NEED FOR HEPATITIS C SCREENING TEST: ICD-10-CM

## 2022-03-17 DIAGNOSIS — Z11.4 SCREENING FOR HIV (HUMAN IMMUNODEFICIENCY VIRUS): ICD-10-CM

## 2022-03-17 DIAGNOSIS — Z98.84 WEIGHT GAIN STATUS POST GASTRIC BYPASS: Primary | ICD-10-CM

## 2022-03-17 DIAGNOSIS — E66.09 CLASS 1 OBESITY DUE TO EXCESS CALORIES WITHOUT SERIOUS COMORBIDITY WITH BODY MASS INDEX (BMI) OF 30.0 TO 30.9 IN ADULT: ICD-10-CM

## 2022-03-17 PROCEDURE — 99214 OFFICE O/P EST MOD 30 MIN: CPT | Performed by: FAMILY MEDICINE

## 2022-03-17 PROCEDURE — 3725F SCREEN DEPRESSION PERFORMED: CPT | Performed by: FAMILY MEDICINE

## 2022-03-17 PROCEDURE — 3008F BODY MASS INDEX DOCD: CPT | Performed by: FAMILY MEDICINE

## 2022-03-17 RX ORDER — CITALOPRAM 10 MG/1
10 TABLET ORAL DAILY
Qty: 30 TABLET | Refills: 5 | Status: SHIPPED | OUTPATIENT
Start: 2022-03-17 | End: 2022-04-21

## 2022-03-17 RX ORDER — MOMETASONE FUROATE 1 MG/G
CREAM TOPICAL DAILY
Qty: 45 G | Refills: 1 | Status: SHIPPED | OUTPATIENT
Start: 2022-03-17 | End: 2022-03-17

## 2022-03-20 NOTE — PROGRESS NOTES
Assessment/Plan:    No problem-specific Assessment & Plan notes found for this encounter  Diagnoses and all orders for this visit:    Weight gain status post gastric bypass    Anxiety  -     citalopram (CeleXA) 10 mg tablet; Take 1 tablet (10 mg total) by mouth daily    Class 1 obesity due to excess calories without serious comorbidity with body mass index (BMI) of 30 0 to 30 9 in adult    Need for hepatitis C screening test  -     Hepatitis C Antibody ([facility]); Future  -     Hepatitis C Antibody ([facility])    Encounter for immunization    Screening for HIV (human immunodeficiency virus)  -     HIV 1/2 Antigen/Antibody (4th Generation) w Reflex [facility]; Future    Other orders  -     CALCIUM PO; Take by mouth  -     Discontinue: mometasone (ELOCON) 0 1 % cream; Apply topically daily        Subjective:      Patient ID: Jenae Mix is a 43 y o  female  This is a follow-up appointment for this 49-year-old female with past history of gastric bypass  The patient is concerned about her weight gain  The patient has gained 11 lb since her last visit  The patient states she has been following a diet and exercise to maintain her weight  The patient is concerned that her weight gain could be secondary to Lexapro or her IUD insertion  Patient has been on Lexapro for greater 6 months  The patient also had IUD insertion multiple months ago also  The patient's next appointment for her gastric bypass surgeon is not for 1 year  The patient was also recently seen in emergency room for a potential hernia  She had a CT scan done emergency room that shows a small ventral hernia  Surgery stated that there was no surgical intervention needed  The patient states that her menstrual cycles have been controlled since the IUD insertion  Patient also states that she start Lexapro 1 week ago  Since she stopped the Lexapro she is having more episodes of anxiety        The following portions of the patient's history were reviewed and updated as appropriate: allergies, current medications, past family history, past medical history, past social history, past surgical history and problem list     Review of Systems   Constitutional: Positive for unexpected weight change  Negative for activity change and appetite change  Respiratory: Negative  Cardiovascular: Negative  Gastrointestinal:        Abdominal pain thought to be hernia   Endocrine: Negative  Musculoskeletal: Negative  Neurological: Negative  Psychiatric/Behavioral: The patient is nervous/anxious  Objective:      /78 (BP Location: Left arm, Patient Position: Sitting, Cuff Size: Adult)   Pulse 65   Temp 98 4 °F (36 9 °C) (Tympanic)   Resp 18   Ht 5' 6" (1 676 m)   Wt 84 8 kg (187 lb)   SpO2 98%   BMI 30 18 kg/m²          Physical Exam  Constitutional:       Comments: The patient is obese at BMI of 30 18   Cardiovascular:      Rate and Rhythm: Normal rate and regular rhythm  Pulmonary:      Effort: Pulmonary effort is normal       Breath sounds: Normal breath sounds  Abdominal:      Comments: Mild nontender mass in the midline of her stomach consistent a hernia   Musculoskeletal:         General: Normal range of motion  Skin:     General: Skin is warm and dry  Neurological:      General: No focal deficit present  Mental Status: She is alert and oriented to person, place, and time  Psychiatric:         Mood and Affect: Mood normal          Behavior: Behavior normal          Thought Content:  Thought content normal          Judgment: Judgment normal

## 2022-03-20 NOTE — PATIENT INSTRUCTIONS
Patient is gaining weight with no clear explanation according to her  Patient states she is following a diet and trying to exercise as much as possible to maintain her weight  The patient was asked to make a follow-up appointment with her gastric bypass surgeon and potentially a dietitian associated with that group  The patient has already stopped Lexapro but is having anxiety symptoms  She was be started on low-dose Celexa 10 mg daily  I explained to the patient it is very unlikely that an IUD insertion was a cause for weight gain  The patient will follow-up with me in approximately 1 month check her weight and see how she is doing with Celexa in treating her anxiety

## 2022-03-24 ENCOUNTER — HOSPITAL ENCOUNTER (OUTPATIENT)
Dept: INFUSION CENTER | Facility: HOSPITAL | Age: 37
Discharge: HOME | End: 2022-03-24
Attending: INTERNAL MEDICINE
Payer: COMMERCIAL

## 2022-03-24 VITALS — TEMPERATURE: 97.5 F

## 2022-03-24 DIAGNOSIS — E61.1 IRON DEFICIENCY: Primary | ICD-10-CM

## 2022-03-24 DIAGNOSIS — K91.2 POSTSURGICAL MALABSORPTION: ICD-10-CM

## 2022-03-24 PROCEDURE — 96372 THER/PROPH/DIAG INJ SC/IM: CPT

## 2022-03-24 RX ORDER — CYANOCOBALAMIN 1000 UG/ML
1000 INJECTION INTRAMUSCULAR; SUBCUTANEOUS ONCE
Status: COMPLETED | OUTPATIENT
Start: 2022-03-24 | End: 2022-03-24

## 2022-03-24 RX ORDER — CYANOCOBALAMIN 1000 UG/ML
1000 INJECTION INTRAMUSCULAR; SUBCUTANEOUS ONCE
Status: CANCELLED | OUTPATIENT
Start: 2022-04-21

## 2022-03-24 RX ADMIN — CYANOCOBALAMIN 1000 MCG: 1000 INJECTION, SOLUTION INTRAMUSCULAR; SUBCUTANEOUS at 14:45

## 2022-04-01 DIAGNOSIS — R60.9 PERIPHERAL EDEMA: ICD-10-CM

## 2022-04-01 RX ORDER — FUROSEMIDE 20 MG/1
TABLET ORAL
Qty: 30 TABLET | Refills: 5 | Status: SHIPPED | OUTPATIENT
Start: 2022-04-01

## 2022-04-21 ENCOUNTER — OFFICE VISIT (OUTPATIENT)
Dept: FAMILY MEDICINE CLINIC | Facility: CLINIC | Age: 37
End: 2022-04-21
Payer: COMMERCIAL

## 2022-04-21 ENCOUNTER — HOSPITAL ENCOUNTER (OUTPATIENT)
Dept: INFUSION CENTER | Facility: HOSPITAL | Age: 37
Discharge: HOME | End: 2022-04-21
Attending: INTERNAL MEDICINE
Payer: COMMERCIAL

## 2022-04-21 ENCOUNTER — OFFICE VISIT (OUTPATIENT)
Dept: BARIATRICS | Facility: CLINIC | Age: 37
End: 2022-04-21
Payer: COMMERCIAL

## 2022-04-21 VITALS
TEMPERATURE: 97.3 F | DIASTOLIC BLOOD PRESSURE: 80 MMHG | RESPIRATION RATE: 18 BRPM | WEIGHT: 193 LBS | HEIGHT: 66 IN | BODY MASS INDEX: 31.02 KG/M2 | SYSTOLIC BLOOD PRESSURE: 122 MMHG | OXYGEN SATURATION: 98 % | HEART RATE: 63 BPM

## 2022-04-21 VITALS
DIASTOLIC BLOOD PRESSURE: 68 MMHG | BODY MASS INDEX: 31.05 KG/M2 | WEIGHT: 193.2 LBS | SYSTOLIC BLOOD PRESSURE: 104 MMHG | HEART RATE: 56 BPM | HEIGHT: 66 IN | TEMPERATURE: 98 F

## 2022-04-21 VITALS — TEMPERATURE: 97.4 F

## 2022-04-21 DIAGNOSIS — K91.2 POSTSURGICAL MALABSORPTION: ICD-10-CM

## 2022-04-21 DIAGNOSIS — J30.1 SEASONAL ALLERGIC RHINITIS DUE TO POLLEN: ICD-10-CM

## 2022-04-21 DIAGNOSIS — E55.9 VITAMIN D INSUFFICIENCY: ICD-10-CM

## 2022-04-21 DIAGNOSIS — E66.9 OBESITY, CLASS I, BMI 30-34.9: ICD-10-CM

## 2022-04-21 DIAGNOSIS — Z23 ENCOUNTER FOR IMMUNIZATION: ICD-10-CM

## 2022-04-21 DIAGNOSIS — D50.8 IRON DEFICIENCY ANEMIA SECONDARY TO INADEQUATE DIETARY IRON INTAKE: ICD-10-CM

## 2022-04-21 DIAGNOSIS — F41.9 ANXIETY: ICD-10-CM

## 2022-04-21 DIAGNOSIS — Z48.815 ENCOUNTER FOR SURGICAL AFTERCARE FOLLOWING SURGERY OF DIGESTIVE SYSTEM: Primary | ICD-10-CM

## 2022-04-21 DIAGNOSIS — Z98.84 S/P GASTRIC BYPASS: ICD-10-CM

## 2022-04-21 DIAGNOSIS — E66.09 CLASS 1 OBESITY DUE TO EXCESS CALORIES WITHOUT SERIOUS COMORBIDITY WITH BODY MASS INDEX (BMI) OF 31.0 TO 31.9 IN ADULT: ICD-10-CM

## 2022-04-21 DIAGNOSIS — E61.1 IRON DEFICIENCY: Primary | ICD-10-CM

## 2022-04-21 DIAGNOSIS — R63.5 WEIGHT GAIN: Primary | ICD-10-CM

## 2022-04-21 PROCEDURE — 99214 OFFICE O/P EST MOD 30 MIN: CPT | Performed by: FAMILY MEDICINE

## 2022-04-21 PROCEDURE — 1036F TOBACCO NON-USER: CPT | Performed by: PHYSICIAN ASSISTANT

## 2022-04-21 PROCEDURE — 3008F BODY MASS INDEX DOCD: CPT | Performed by: FAMILY MEDICINE

## 2022-04-21 PROCEDURE — 90677 PCV20 VACCINE IM: CPT

## 2022-04-21 PROCEDURE — 96372 THER/PROPH/DIAG INJ SC/IM: CPT

## 2022-04-21 PROCEDURE — 3725F SCREEN DEPRESSION PERFORMED: CPT | Performed by: FAMILY MEDICINE

## 2022-04-21 PROCEDURE — 99214 OFFICE O/P EST MOD 30 MIN: CPT | Performed by: PHYSICIAN ASSISTANT

## 2022-04-21 RX ORDER — FEXOFENADINE HCL 180 MG/1
180 TABLET ORAL DAILY
Qty: 30 TABLET | Refills: 5 | Status: SHIPPED | OUTPATIENT
Start: 2022-04-21

## 2022-04-21 RX ORDER — CITALOPRAM 20 MG/1
20 TABLET ORAL DAILY
Qty: 30 TABLET | Refills: 5 | Status: SHIPPED | OUTPATIENT
Start: 2022-04-21

## 2022-04-21 RX ORDER — CYANOCOBALAMIN 1000 UG/ML
1000 INJECTION INTRAMUSCULAR; SUBCUTANEOUS ONCE
Status: COMPLETED | OUTPATIENT
Start: 2022-04-21 | End: 2022-04-21

## 2022-04-21 RX ORDER — CYANOCOBALAMIN 1000 UG/ML
1000 INJECTION INTRAMUSCULAR; SUBCUTANEOUS ONCE
Status: CANCELLED | OUTPATIENT
Start: 2022-05-19

## 2022-04-21 RX ADMIN — CYANOCOBALAMIN 1000 MCG: 1000 INJECTION INTRAMUSCULAR; SUBCUTANEOUS at 12:26

## 2022-04-21 NOTE — ASSESSMENT & PLAN NOTE
-At risk for malabsorption of vitamins/minerals secondary to malabsorption and restriction of intake from bariatric surgery  -NOT Currently taking adequate postop bariatric surgery vitamin supplementation: OTC MVI sporadically, no calcium  -To review with RD again; I advised her that she must restart Beny MVI and calcium 1500mg daily; discussed risks  -Obtain CBC/Metabolic panel (iron and P57 monitored by Hematology)  -Patient received education about the importance of adhering to a lifelong supplementation regimen to avoid vitamin/mineral deficiencies

## 2022-04-21 NOTE — PROGRESS NOTES
Assessment/Plan:    No problem-specific Assessment & Plan notes found for this encounter  Diagnoses and all orders for this visit:    Weight gain    S/P gastric bypass    Anxiety  -     citalopram (CeleXA) 20 mg tablet; Take 1 tablet (20 mg total) by mouth daily    Seasonal allergic rhinitis due to pollen  -     fexofenadine (ALLEGRA) 180 MG tablet; Take 1 tablet (180 mg total) by mouth daily    Class 1 obesity due to excess calories without serious comorbidity with body mass index (BMI) of 31 0 to 31 9 in adult    Encounter for immunization  -     Pneumococcal Conjugate Vaccine 20-valent (PCV20)        Subjective:      Patient ID: Malia Mora is a 42 y o  female  This is a follow-up appointment for this 68-year-old female who is concerned about weight gain after her bariatric surgery  Patient states increased anxiety about her weight gain also  A recently switched her from Lexapro to Celexa because she was concerned that Lexapro may be leading to weight gain  The patient needs to schedule an appointment with her bariatric surgeon to discuss the weight gain  The patient states she has been diligently following a good diet and watching her calories  The patient also believes that Mirena is causing her weight gain  She will schedule an appointment with her gyn to remove the Mirena  She also complains about problems with allergic rhinitis  The following portions of the patient's history were reviewed and updated as appropriate: allergies, current medications, past family history, past medical history, past social history, past surgical history and problem list     Review of Systems   HENT: Positive for postnasal drip and rhinorrhea  Eyes: Negative  Respiratory: Negative  Cardiovascular: Negative  Gastrointestinal: Negative  Endocrine: Negative  Musculoskeletal: Negative  Allergic/Immunologic: Negative  Neurological: Negative  Hematological: Negative  Psychiatric/Behavioral: Negative  All other systems reviewed and are negative  Objective:      /80 (BP Location: Left arm, Patient Position: Sitting, Cuff Size: Standard)   Pulse 63   Temp (!) 97 3 °F (36 3 °C) (Tympanic)   Resp 18   Ht 5' 6" (1 676 m)   Wt 87 5 kg (193 lb)   SpO2 98%   BMI 31 15 kg/m²          Physical Exam  Constitutional:       Comments: Patient is obese with a BMI 31 18   Cardiovascular:      Rate and Rhythm: Normal rate and regular rhythm  Heart sounds: Normal heart sounds  Pulmonary:      Effort: Pulmonary effort is normal       Breath sounds: Normal breath sounds  Musculoskeletal:         General: Normal range of motion  Neurological:      General: No focal deficit present  Mental Status: She is alert and oriented to person, place, and time  Mental status is at baseline  Psychiatric:         Mood and Affect: Mood normal          Behavior: Behavior normal          Thought Content:  Thought content normal          Judgment: Judgment normal

## 2022-04-21 NOTE — PATIENT INSTRUCTIONS
HISTORY & PHYSICAL PRIOR TO GI (GASTROINTESTINAL) PROCEDURE      HISTORY OF PRESENT ILLNESS:  39 year old female with history of GERd, dyspepsia      MEDICAL HISTORY  Significant medical/surgical history:   Past Medical History:   Diagnosis Date   • Amenorrhea 6/5/2013   • COVID-19 12/08/2020   • Dyspareunia 12/14/2013   • Heart palpitations    • HTN (hypertension) 2004   • Menarche 11 years of age    menses irregular, with flow lasting 5 days   • Sickle cell trait (CMS/HCC)    • STD (sexually transmitted disease) 2008    chlamydia   • Vulvovaginal candidiasis 12/8/2014       Date of last   Past Surgical History:   Procedure Laterality Date   • Breast reduction surgery  2002   • Liposuction  11/23/2019   • Reform tooth extraction  2012    Upper Arch       Past Complications with Sedation/Anesthesia:  nil    Possible Pregnancy (Last Menstrual Period):  nil    Significant Family History:  Family History   Problem Relation Age of Onset   • High blood pressure Mother    • Cancer Father         pancreatic   • Heart Daughter         murmur   • Other Daughter         pulmonary stenosis   • Asthma Son    • Asthma Daughter    • NEGATIVE FAMILY HX OF Sister    • NEGATIVE FAMILY HX OF Brother        Social History     Socioeconomic History   • Marital status: Single     Spouse name: Not on file   • Number of children: Not on file   • Years of education: Not on file   • Highest education level: Not on file   Occupational History   • Occupation:    Tobacco Use   • Smoking status: Never Smoker   • Smokeless tobacco: Never Used   Vaping Use   • Vaping Use: never used   Substance and Sexual Activity   • Alcohol use: Yes     Alcohol/week: 0.0 standard drinks     Comment: Occasional   • Drug use: No   • Sexual activity: Yes     Partners: Male     Birth control/protection: Condom, I.U.D.   Other Topics Concern   • Not on file   Social History Narrative   • Not on file     Social Determinants of Health     Financial  This patient's anxiety medicine will be increased  Celexa will be increased to 20 mg daily  The patient will make an appointment with her gyn physician to remove the Mirena  Patient will follow-up with me in 6 weeks to check her weight and anxiety  She was also prescribed fexofenadine 180 mg tablet daily to treat her allergic rhinitis  The patient will continue to monitor her calories daily and make an appointment with her gastric bypass dietitian  Resource Strain:    • Social Determinants: Financial Resource Strain: Not on file   Food Insecurity:    • Social Determinants: Food Insecurity: Not on file   Transportation Needs:    • Lack of Transportation (Medical): Not on file   • Lack of Transportation (Non-Medical): Not on file   Physical Activity:    • Days of Exercise per Week: Not on file   • Minutes of Exercise per Session: Not on file   Stress:    • Social Determinants: Stress: Not on file   Social Connections:    • Social Determinants: Social Connections: Not on file   Intimate Partner Violence: Not At Risk   • Social Determinants: Intimate Partner Violence Past Fear: No   • Social Determinants: Intimate Partner Violence Current Fear: No       ALLERGIES:   Allergen Reactions   • Adhesive   (Environmental)      Possible allergy per pt.    • Lisinopril Cough   • Metoclopramide Other (See Comments)     Swollen tounge   • Naproxen PRURITUS   • Amlodipine PRURITUS       Current Outpatient Medications   Medication Sig Dispense Refill   • atenolol (TENORMIN) 50 MG tablet Take 1 tablet by mouth daily. 90 tablet 1   • Multiple Vitamin (MULTIVITAMIN ADULT PO) Take 1 tablet by mouth daily (before breakfast).     • omeprazole (PriLOSEC) 40 MG capsule TAKE 1 CAPSULE BY MOUTH DAILY 90 capsule 0   • spironolactone (ALDACTONE) 50 MG tablet Take 1 tablet by mouth daily. Increase in dose 90 tablet 3   • ondansetron (ZOFRAN ODT) 8 MG disintegrating tablet Place 1 tablet onto the tongue every 8 hours as needed for Nausea. 20 tablet 0   • levonorgestrel (MIRENA, 52 MG,) (52 MG) 20 MCG/DAY intrauterine device      • loratadine (CLARITIN) 10 MG tablet Take 1 tablet by mouth daily. (Patient taking differently: Take 10 mg by mouth daily as needed. ) 30 tablet 0   • sodium chloride (OCEAN) 0.65 % nasal spray Spray 1 spray in each nostril as needed for Congestion. 30 mL PRN   • polyethylene glycol (MIRALAX) powder Take 17 g by mouth daily. As needed for constipation 255 g 1   •  tiZANidine (ZANAFLEX) 2 MG tablet Take 1 tablet by mouth 2 times daily as needed (neck pain). 30 tablet 0   • omeprazole (PrilOSEC) 20 MG capsule TAKE 1 CAPSULE BY MOUTH DAILY 90 capsule 0   • benzonatate (TESSALON PERLES) 200 MG capsule Take 1 capsule by mouth 3 times daily as needed for Cough. 30 capsule 0   • Lidocaine HCl (lidocaine viscous) 2 % solution 30mL Maalox/30mL Benadryl/30mL Viscous Lidocaine 2%. Swallow 5mL 4 times daily prn pain 90 mL 0     Current Facility-Administered Medications   Medication Dose Route Frequency Provider Last Rate Last Admin   • lidocaine-sodium bicarbonate 0.9-8.4% for J-tip administration 0.5-1 mL  0.5-1 mL Subcutaneous PRN Glo Vargas MD        Or   • lidocaine 1 % injection 5-10 mg  5-10 mg Subcutaneous PRN Glo Vargas MD       • metoPROLOL tartrate (LOPRESSOR) tablet 25 mg  25 mg Oral Once PRN Glo Vargas MD       • dextrose (GLUTOSE) 40 % gel 30 g  30 g Oral Once PRN Glo Vargas MD       • dextrose 50 % injection 25 g  25 g Intravenous PRN Glo Vargas MD       • insulin regular (human) (HumuLIN R, NovoLIN R) sliding scale injection   Subcutaneous Once PRN Glo Vargas MD       • sodium chloride 0.9 % flush bag 25 mL  25 mL Intravenous PRN Glo Vargas MD       • sodium chloride (PF) 0.9 % injection 2 mL  2 mL Intracatheter 2 times per day Glo Vargas MD       • lactated ringers infusion   Intravenous Continuous Glo Vargas MD       • sodium chloride 0.9% infusion   Intravenous Continuous Glo Vargas MD       • dextrose 5 % infusion   Intravenous Continuous PRN Glo Vargas MD       • sodium chloride 0.9% infusion   Intravenous Continuous Tolu Melgoza MD       • sodium chloride (PF) 0.9 % injection 2 mL  2 mL Intracatheter 2 times per day Tolu Melgoza MD       • sodium chloride 0.9 % flush bag 25 mL  25 mL Intravenous PRN Tolu Melgoza MD       • lidocaine viscous 2 % oral solution 10 mL  10 mL Mouth/Throat Once PRN Tolu  MD Rhona       • LIDOCAINE HCL (PF) 1 % IJ SOLN Pyxis Override            • PROPOFOL 10 MG/ML IV EMUL(WRAPPED) Pyxis Override            • LIDOCAINE HCL 4 % EX SOLN Pyxis Override            • SIMETHICONE 40 MG/0.6ML PO SUSP Pyxis Override                REVIEW OF SYSTEMS:  A complete review of systems was performed and found to be negative except for what was stated in the HPI (History of Present Illness).    PHYSICAL EXAM:  Vitals:    Visit Vitals  BP (!) 136/99   Pulse 74   Temp 98.1 °F (36.7 °C) (Temporal)   Resp 19   Wt 93 kg (205 lb 0.4 oz)   LMP 10/28/2021 (Approximate)   SpO2 98%   BMI 33.09 kg/m²      Constitutional:  Alert and oriented x3, NAD (no acute distress).  Skin:  No jaundice.  Skin is warm and dry on palpation.  Eyes:  PERRL (pupils equal, round, and reactive to light), EOMI (extraocular movements are intact).  Normal conjunctivae and lids, no scleral icterus.  Pulmonary:  Clear to auscultation bilaterally.  Cardiovascular:  S1 and S2, no murmur, regular rate on auscultation.   Abdomen:  Soft, nontender, no distention, bowel sounds present.  No hepatosplenomegaly, fullness, guarding or rebound noted.  Musculoskeletal:  Patient moving all extremities appropriately.  No cyanosis, clubbing or edema noted.   Neurologic:  Grossly nonfocal, CN (cranial nerves) 2-12 grossly intact, detailed neurological exam was not performed.     Assessment and Plan:  Patient was advised of the risks, complications, and benefits of the procedure including but not limited to bleeding, perforation, medication reaction, infection, and surgery.  The patient was advised of the alternatives of the procedures, as well as the possibility that a small cancerous polyp or tumor could be missed.  The patient does understand and agrees to the procedure.     Patient is a candidate for planned procedure EGD for GERD, dyspepsia    Plan for Sedation:  MAC IV    Tolu Melgoza MD  11/23/2021

## 2022-04-21 NOTE — ASSESSMENT & PLAN NOTE
-s/p Teodoro-En-Y Gastric Bypass and repair of paraesophageal hernia repair with Dr Freddie Griffin on 07/20/20  She is struggling with weight regain  Will f/u with surgical RD and LCSW and consult with MWM  I advised she must start exercise, avoid excessive nuts and CHO, try and add in veggies, avoid drinking for 60 min after eating, no more soda or ETOH  Initial: 272 4lbs  Current: 193 2lbs  EWL: 68%  Ronald: current  Current BMI is There is no height or weight on file to calculate BMI  · Tolerating a regular diet-yes  · Eating at least 60 grams of protein per day-yes  · Following 30/60 minute rule with liquids-no; advised on importance  · Drinking at least 64 ounces of fluid per day-yes  · Drinking carbonated beverages-no  · Sufficient exercise-no; Recommend starting walking program as tolerated  · Using NSAIDs regularly-no  · Using nicotine-no  · Using alcohol-once a month   Advised about the risks of alcohol s/p bariatric surgery and recommend avoiding all alcohol

## 2022-04-21 NOTE — PATIENT INSTRUCTIONS
GOALS:   · Continued/Maintain healthy weight loss with good nutrition intakes  · Adequate hydration with at least 64oz  fluid intake  · Normal vitamin and mineral levels  · Exercise as tolerated  · Food logs - follow up with Dalila  · Consult with Medical Weight management    · Follow-up in 1 year  We kindly ask that your arrive 15 minutes before your scheduled appointment time with your provider to allow our staff to room you, get your vital signs and update your chart  · Follow diet as discussed  · Get lab work done in the next 2 weeks  You have been given a lab slip today  Please call the office if you need a replacement  It is recommended to check with your insurance BEFORE getting labs done to make sure they are covered by your policy  Also, please check with your PCP and other providers before getting labs to avoid duplicate labs  Make sure to HOLD any multivitamins that may contain biotin and any biotin supplements FOR 5 DAYS before any labs since it can affect the results  · Follow vitamin and mineral recommendations as reviewed with you  · Call our office if you have any problems with abdominal pain especially associated with fever, chills, nausea, vomiting or any other concerns  · All  Post-bariatric surgery patients should be aware that very small quantities of any alcohol can cause impairment and it is very possible not to feel the effect  The effect can be in the system for several hours  It is also a stomach irritant  · It is advised to AVOID alcohol, Nonsteroidal antiinflammatory drugs (NSAIDS) and nicotine of all forms   Any of these can cause stomach irritation/pain

## 2022-04-21 NOTE — ASSESSMENT & PLAN NOTE
-Most recent iron/ferritin WNL - on iron infusions  -Continue monitoring and management with Hematology

## 2022-04-21 NOTE — PROGRESS NOTES
Assessment/Plan:    Encounter for surgical aftercare following surgery of digestive system  -s/p Teodoro-En-Y Gastric Bypass and repair of paraesophageal hernia repair with Dr Lo Houston on 07/20/20  She is struggling with weight regain  Will f/u with surgical RD and LCSW and consult with MWM  I advised she must start exercise, avoid excessive nuts and CHO, try and add in veggies, avoid drinking for 60 min after eating, no more soda or ETOH  Initial: 272 4lbs  Current: 193 2lbs  EWL: 68%  Ronald: current  Current BMI is There is no height or weight on file to calculate BMI  · Tolerating a regular diet-yes  · Eating at least 60 grams of protein per day-yes  · Following 30/60 minute rule with liquids-no; advised on importance  · Drinking at least 64 ounces of fluid per day-yes  · Drinking carbonated beverages-no  · Sufficient exercise-no; Recommend starting walking program as tolerated  · Using NSAIDs regularly-no  · Using nicotine-no  · Using alcohol-once a month   Advised about the risks of alcohol s/p bariatric surgery and recommend avoiding all alcohol    Postsurgical malabsorption  -At risk for malabsorption of vitamins/minerals secondary to malabsorption and restriction of intake from bariatric surgery  -NOT Currently taking adequate postop bariatric surgery vitamin supplementation: OTC MVI sporadically, no calcium  -To review with RD again; I advised her that she must restart Beny MVI and calcium 1500mg daily; discussed risks  -Obtain CBC/Metabolic panel (iron and K22 monitored by Hematology)  -Patient received education about the importance of adhering to a lifelong supplementation regimen to avoid vitamin/mineral deficiencies     Iron deficiency anemia secondary to inadequate dietary iron intake  -Most recent iron/ferritin WNL - on iron infusions  -Continue monitoring and management with Hematology        Diagnoses and all orders for this visit:    Encounter for surgical aftercare following surgery of digestive system    Postsurgical malabsorption  -     CBC and differential; Future  -     Comprehensive metabolic panel; Future  -     Folate; Future  -     Hemoglobin A1C; Future  -     Zinc; Future  -     Vitamin D 25 hydroxy; Future  -     Vitamin B1, whole blood; Future  -     Vitamin A; Future  -     PTH, intact; Future  -     Lipid panel; Future  -     CBC and differential  -     Comprehensive metabolic panel  -     Folate  -     Hemoglobin A1C  -     Zinc  -     Vitamin D 25 hydroxy  -     Vitamin B1, whole blood  -     Vitamin A  -     PTH, intact  -     Lipid panel    Vitamin D insufficiency  -     Vitamin D 25 hydroxy; Future  -     Vitamin D 25 hydroxy    Obesity, Class I, BMI 30-34 9  -     Hemoglobin A1C; Future  -     Lipid panel; Future  -     Hemoglobin A1C  -     Lipid panel    Iron deficiency anemia secondary to inadequate dietary iron intake          Subjective:      Patient ID: Martínez Verdugo is a 42 y o  female  -s/p Teodoro-En-Y Gastric Bypass and repair of paraesophageal hernia repair with Dr Gogo Odonnell on 07/20/20  Presents to the office today for routine follow up and concerns for weight regain  Tolerating diet without issues; denies N/V, dysphagia, reflux  She is struggling with weight regain  She saw her PCP today  Was changed from Lexapro to Celexa  Has Mirena IUD and fearful that this is causing weight regain  She had Mirena and Lexapro started on same day in October - fears this caused her weight regain  She takes lasix 20mg 2x/week for some mild leg swelling  Has BM every 2 days - some diarrhea occasionally s/p lap chitra in August  She had CT with Dr Dain Humphrey last month for LLQ abdominal pain which is now resolved  CTAP showed tiny fat containing umbilical hernia, no obstruction  Was tracking - at most 1500kcals, no longer tracking, doesn't like any veggies or fruit      Diet Recall:   B - protein shake or scrambled eggs and 1-2 pieces ramos  Snack - 1/2 cup mixed nuts  L - animal protein and 1/4 cup rice, no veggies  D - animal protein chicken or pork   HS - [employer] snack packs    Fluids - 80oz+water w/ cystal lite, 1x/month 1-2 mixed drink, was drinking soda more often; now has diet coke 2x/week     The following portions of the patient's history were reviewed and updated as appropriate: allergies, current medications, past family history, past medical history, past social history, past surgical history and problem list     Review of Systems   Constitutional: Positive for unexpected weight change (25lbs weight gain)  Negative for chills and fever  HENT: Negative for trouble swallowing  Respiratory: Negative for cough and shortness of breath  Cardiovascular: Negative for chest pain and palpitations  Gastrointestinal: Negative for abdominal pain, constipation, diarrhea, nausea and vomiting  Neurological: Negative for dizziness  Psychiatric/Behavioral: The patient is nervous/anxious  Objective:      /68 (BP Location: Right arm, Patient Position: Sitting, Cuff Size: Standard)   Pulse 56   Temp 98 °F (36 7 °C) (Tympanic)   Ht 5' 6" (1 676 m)   Wt 87 6 kg (193 lb 3 2 oz)   BMI 31 18 kg/m²     Colonoscopy-Not applicable       Physical Exam  Vitals reviewed  Constitutional:       General: She is not in acute distress  Appearance: She is well-developed  HENT:      Head: Normocephalic and atraumatic  Eyes:      General: No scleral icterus  Cardiovascular:      Rate and Rhythm: Normal rate and regular rhythm  Pulmonary:      Effort: Pulmonary effort is normal  No respiratory distress  Abdominal:      General: There is no distension  Palpations: Abdomen is soft  Tenderness: There is no abdominal tenderness  Comments: No incisional hernias appreciated   Skin:     General: Skin is warm and dry  Neurological:      Mental Status: She is alert and oriented to person, place, and time     Psychiatric:         Mood and Affect: Mood normal          Behavior: Behavior normal            BARRIERS: none identified    GOALS:   · Continued/Maintain healthy weight loss with good nutrition intakes  · Adequate hydration with at least 64oz  fluid intake  · Normal vitamin and mineral levels  · Exercise as tolerated  · Food logs - follow up with Dalila  · Consult with Medical Weight management    · Follow-up in 1 year  We kindly ask that your arrive 15 minutes before your scheduled appointment time with your provider to allow our staff to room you, get your vital signs and update your chart  · Follow diet as discussed  · Get lab work done in the next 2 weeks  You have been given a lab slip today  Please call the office if you need a replacement  It is recommended to check with your insurance BEFORE getting labs done to make sure they are covered by your policy  Also, please check with your PCP and other providers before getting labs to avoid duplicate labs  Make sure to HOLD any multivitamins that may contain biotin and any biotin supplements FOR 5 DAYS before any labs since it can affect the results  · Follow vitamin and mineral recommendations as reviewed with you  · Call our office if you have any problems with abdominal pain especially associated with fever, chills, nausea, vomiting or any other concerns  · All  Post-bariatric surgery patients should be aware that very small quantities of any alcohol can cause impairment and it is very possible not to feel the effect  The effect can be in the system for several hours  It is also a stomach irritant  · It is advised to AVOID alcohol, Nonsteroidal antiinflammatory drugs (NSAIDS) and nicotine of all forms   Any of these can cause stomach irritation/pain

## 2022-05-03 ENCOUNTER — OFFICE VISIT (OUTPATIENT)
Dept: BARIATRICS | Facility: CLINIC | Age: 37
End: 2022-05-03
Payer: COMMERCIAL

## 2022-05-03 VITALS
TEMPERATURE: 99.5 F | HEIGHT: 66 IN | HEART RATE: 58 BPM | RESPIRATION RATE: 18 BRPM | DIASTOLIC BLOOD PRESSURE: 72 MMHG | SYSTOLIC BLOOD PRESSURE: 122 MMHG | BODY MASS INDEX: 31.27 KG/M2 | WEIGHT: 194.6 LBS

## 2022-05-03 DIAGNOSIS — E28.2 PCOS (POLYCYSTIC OVARIAN SYNDROME): ICD-10-CM

## 2022-05-03 DIAGNOSIS — R63.5 WEIGHT GAIN STATUS POST GASTRIC BYPASS: ICD-10-CM

## 2022-05-03 DIAGNOSIS — F41.9 ANXIETY: ICD-10-CM

## 2022-05-03 DIAGNOSIS — Z98.84 WEIGHT GAIN STATUS POST GASTRIC BYPASS: ICD-10-CM

## 2022-05-03 DIAGNOSIS — E66.9 OBESITY, CLASS I, BMI 30-34.9: Primary | ICD-10-CM

## 2022-05-03 PROCEDURE — 3008F BODY MASS INDEX DOCD: CPT | Performed by: FAMILY MEDICINE

## 2022-05-03 PROCEDURE — 99214 OFFICE O/P EST MOD 30 MIN: CPT | Performed by: FAMILY MEDICINE

## 2022-05-03 PROCEDURE — 1036F TOBACCO NON-USER: CPT | Performed by: FAMILY MEDICINE

## 2022-05-03 NOTE — PROGRESS NOTES
Assessment/Plan:  Marisol was seen today for consult  Diagnoses and all orders for this visit:    Obesity, Class I, BMI 30-34 9  PCOS (polycystic ovarian syndrome)  Anxiety  Weight gain status post gastric bypass  Patient presents for medical weight management consultation for postoperative weight gain which she believes is related to her Mirena  She would be interested in starting medication  I have asked patient to have labs drawn that were previously ordered by bariatric surgery  May possibly benefit from having metformin started as she has PCOS  May possibly benefit from having Wellbutrin started to help in treatment of anxiety  May possibly benefit from having Topamax started due to postop weight gain  All medications can help with weight loss  Labs will be reviewed and we will go from there  I have asked patient to send me a [employer] message when she has her labs drawn so I can follow up on results  Healthy eating highly encouraged  Patient does not like vegetables but she does like mashed potatoes so I recommended she try mashed cauliflower and she will try this  - Discussed options of HealthyCORE-Intensive Lifestyle Intervention Program, Very Low Calorie Diet-VLCD and Conservative Program and the role of weight loss medications  - Explained the importance of making lifestyle changes first before starting any anti-obesity medications  Patient should demonstrate lifestyle changes first before anti-obesity medication can be initiated  - Patient is interested in pursuing Conservative Program  - Initial weight loss goal of 5-10% weight loss for improved health  - Weight loss can improve patient's co-morbid conditions and/or prevent weight-related complications  Goals:  Do not skip any meals! Food log (ie ) www myfitnesspal com,sparkpeople  com,loseit com,calorieking  com,etc  baritastic (use skinnytaste  com, dietdoctor  com or smartphone kayode Aeter[employer]LED for recipes)  No sugary beverages  At least 64oz of water daily  Increase physical activity by 10 minutes daily  Gradually increase physical activity to a goal of 5 days per week for 30 minutes of MODERATE intensity PLUS 2 days per week of FULL BODY resistance training (use smartphone apps FitON, Home Workout, etc )      Total time spent: 30 min, with >50% face-to-face time spent counseling patient on nonsurgical interventions for the treatment of excess weight  Discussed in detail nonsurgical options including intensive lifestyle intervention program, very low-calorie diet program and conservative program   Discussed the role of weight loss medications  Counseled patient on diet behavior and exercise modification for weight loss  Follow up in approximately 2 months with Non-Surgical Physician/Advanced Practitioner  Subjective:   Chief Complaint   Patient presents with    Consult     sent over via [employer]        Patient ID: Rupert Lui  is a 42 y o  female with excess weight/obesity here to pursue weight management  Previous notes and records have been reviewed      Past Medical History:   Diagnosis Date    Allergic rhinitis     Anemia     ok currently    Anxiety     Asthma     activity induced    Benign neoplasm of skin of trunk     Morbid obesity with BMI of 40 0-44 9, adult (HCC)     Polycystic ovary syndrome     Postgastrectomy malabsorption     Skin tag     Sleep apnea     Tinea versicolor      Past Surgical History:   Procedure Laterality Date    CERVICAL CERCLAGE       SECTION  2017    PARAESOPHAGEAL HERNIA REPAIR N/A 2020    Procedure: REPAIR HERNIA PARAESOPHAGEAL  LAPAROSCOPIC;  Surgeon: Ld Oscar MD;  Location: [address] [place] [address];  Service: Bariatrics    RI LAP GASTRIC BYPASS/JENNIFER-EN-Y N/A 2020    Procedure: BYPASS GASTRIC  JENNIFER-EN-Y LAPAROSCOPIC;  Surgeon: Ld Oscar MD;  Location: [address] [place] [address];  Service: Bariatrics    RI LAP,CHOLECYSTECTOMY N/A 2021 Procedure: CHOLECYSTECTOMY LAPAROSCOPIC;  Surgeon: Tyler Gee MD;  Location: [address] [place] [address];  Service: General    TONSILLECTOMY      TUBAL LIGATION  2017       HPI:  Patient presents for medical weight management consult  Did have gastric bypass about two years ago  Has had weight gain which started shortly after having Mirena placed last year  She is very upset that her clothes are no longer fitting  She would like help with weight loss  She is trying her hardest to eat healthy  Referred here by surgical team     Wt Readings from Last 20 Encounters:   05/03/22 88 3 kg (194 lb 9 6 oz)   04/21/22 87 6 kg (193 lb 3 2 oz)   04/21/22 87 5 kg (193 lb)   03/17/22 84 8 kg (187 lb)   02/24/22 84 4 kg (186 lb)   01/27/22 83 9 kg (185 lb)   12/02/21 79 9 kg (176 lb 3 2 oz)   10/28/21 77 6 kg (171 lb)   10/21/21 79 3 kg (174 lb 12 8 oz)   09/26/21 78 kg (172 lb)   08/18/21 77 7 kg (171 lb 3 2 oz)   08/04/21 77 7 kg (171 lb 6 4 oz)   07/22/21 76 5 kg (168 lb 9 6 oz)   07/13/21 77 6 kg (171 lb)   06/17/21 79 kg (174 lb 3 2 oz)   04/05/21 78 7 kg (173 lb 9 6 oz)   03/12/21 80 3 kg (177 lb)   02/22/21 84 kg (185 lb 3 2 oz)   02/10/21 82 4 kg (181 lb 11 2 oz)   01/19/21 83 1 kg (183 lb 3 2 oz)     Obesity/Excess Weight:  Severity: Mild  Onset:  5mo  Shortly after having Mirena placed    Modifiers: Previously had gastric bypass in July 2020 and has had weight gain  Contributing factors: Medications  Associated symptoms: fatigue, decreased self esteem and clothes do not fit    Eats mostly meat  Does not like vegetables  Tries to avoid starches like mashed potatoes and pasta  Does utilize a protein shake with fiber to help with bowel movements        Hydration: 96oz water per day, occ sips of soda  Alcohol: rare  Smoking: no  Exercise: walks dog for 2 miles every night  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)  Sleep: 8hrs  STOP bang: has BERENICE but does not use CPAP    The following portions of the patient's history were reviewed and updated as appropriate: allergies, current medications, past family history, past medical history, past social history, past surgical history, and problem list     Review of Systems   Constitutional: Negative for fever  Respiratory: Negative for shortness of breath  Cardiovascular: Negative for chest pain  Gastrointestinal: Negative for abdominal pain and blood in stool  Genitourinary: Negative for dysuria and hematuria  Musculoskeletal: Negative for arthralgias and myalgias  Skin: Negative for rash  Neurological: Negative for headaches  Psychiatric/Behavioral: Negative for dysphoric mood and suicidal ideas  The patient is not nervous/anxious  Objective:  /72 (BP Location: Left arm, Patient Position: Sitting, Cuff Size: Large)   Pulse 58   Temp 99 5 °F (37 5 °C) (Tympanic)   Resp 18   Ht 5' 6" (1 676 m)   Wt 88 3 kg (194 lb 9 6 oz)   BMI 31 41 kg/m²   Constitutional: Well-developed, well-nourished and  obese Body mass index is 31 41 kg/m²  David Gaffney HEENT: No conjunctival injection  Pulmonary: No increased work of breathing or signs of respiratory distress  CV: Well-perfused  GI:  Obese  Non-distended  MSK: No edema   Neuro: Oriented to person, place and time  Normal Speech  Normal gait  Psych: Normal affect and mood  Labs and Imaging  Recent labs and imaging have been personally reviewed    Lab Results   Component Value Date    WBC 5 54 01/20/2022    HGB 13 9 01/20/2022    HCT 42 8 01/20/2022    MCV 88 01/20/2022     01/20/2022     Lab Results   Component Value Date    SODIUM 139 12/02/2021    K 4 1 12/02/2021     12/02/2021    CO2 24 12/02/2021    AGAP 7 07/21/2020    BUN 11 12/02/2021    CREATININE 0 83 12/02/2021    GLUC 79 12/02/2021    CALCIUM 8 6 07/21/2020    AST 17 01/05/2021    ALT 25 01/05/2021    TP 6 3 01/05/2021    TBILI 0 4 01/05/2021    EGFR 74 07/21/2020     Lab Results   Component Value Date    HGBA1C 5 5 02/21/2020     Lab Results   Component Value Date    LFK6VEZXLGNH 2 400 08/09/2017    TSH 2 330 12/02/2021     Lab Results   Component Value Date    CHOLESTEROL 141 07/20/2021     Lab Results   Component Value Date    HDL 63 07/20/2021     Lab Results   Component Value Date    TRIG 58 07/20/2021     Lab Results   Component Value Date    LDLCALC 66 07/20/2021

## 2022-05-05 LAB
HCV AB S/CO SERPL IA: <0.1 S/CO RATIO (ref 0–0.9)
HIV 1+2 AB+HIV1 P24 AG SERPL QL IA: NON REACTIVE

## 2022-05-16 ENCOUNTER — TELEPHONE (OUTPATIENT)
Dept: HEMATOLOGY ONCOLOGY | Facility: MEDICAL CENTER | Age: 37
End: 2022-05-16

## 2022-05-16 ENCOUNTER — TELEPHONE (OUTPATIENT)
Dept: BARIATRICS | Facility: CLINIC | Age: 37
End: 2022-05-16

## 2022-05-16 DIAGNOSIS — K91.2 POSTSURGICAL MALABSORPTION: Primary | ICD-10-CM

## 2022-05-16 DIAGNOSIS — E61.1 IRON DEFICIENCY: Primary | ICD-10-CM

## 2022-05-16 DIAGNOSIS — E55.9 VITAMIN D DEFICIENCY: ICD-10-CM

## 2022-05-16 LAB
25(OH)D3+25(OH)D2 SERPL-MCNC: 16.2 NG/ML (ref 30–100)
ALBUMIN SERPL-MCNC: 4.3 G/DL (ref 3.8–4.8)
ALBUMIN/GLOB SERPL: 1.8 {RATIO} (ref 1.2–2.2)
ALP SERPL-CCNC: 96 IU/L (ref 44–121)
ALT SERPL-CCNC: 38 IU/L (ref 0–32)
AST SERPL-CCNC: 25 IU/L (ref 0–40)
BASOPHILS # BLD AUTO: 0 X10E3/UL (ref 0–0.2)
BASOPHILS NFR BLD AUTO: 1 %
BILIRUB SERPL-MCNC: 0.4 MG/DL (ref 0–1.2)
BUN SERPL-MCNC: 11 MG/DL (ref 6–20)
BUN/CREAT SERPL: 15 (ref 9–23)
CALCIUM SERPL-MCNC: 9.2 MG/DL (ref 8.7–10.2)
CHLORIDE SERPL-SCNC: 103 MMOL/L (ref 96–106)
CHOLEST SERPL-MCNC: 157 MG/DL (ref 100–199)
CHOLEST/HDLC SERPL: 2.4 RATIO (ref 0–4.4)
CO2 SERPL-SCNC: 24 MMOL/L (ref 20–29)
CREAT SERPL-MCNC: 0.75 MG/DL (ref 0.57–1)
EGFR: 104 ML/MIN/1.73
EOSINOPHIL # BLD AUTO: 0.2 X10E3/UL (ref 0–0.4)
EOSINOPHIL NFR BLD AUTO: 3 %
ERYTHROCYTE [DISTWIDTH] IN BLOOD BY AUTOMATED COUNT: 12.9 % (ref 11.7–15.4)
EST. AVERAGE GLUCOSE BLD GHB EST-MCNC: 100 MG/DL
FOLATE SERPL-MCNC: 8.5 NG/ML
GLOBULIN SER-MCNC: 2.4 G/DL (ref 1.5–4.5)
GLUCOSE SERPL-MCNC: 85 MG/DL (ref 65–99)
HBA1C MFR BLD: 5.1 % (ref 4.8–5.6)
HCT VFR BLD AUTO: 41.6 % (ref 34–46.6)
HDLC SERPL-MCNC: 65 MG/DL
HGB BLD-MCNC: 14.1 G/DL (ref 11.1–15.9)
IMM GRANULOCYTES # BLD: 0 X10E3/UL (ref 0–0.1)
IMM GRANULOCYTES NFR BLD: 0 %
LDLC SERPL CALC-MCNC: 77 MG/DL (ref 0–99)
LYMPHOCYTES # BLD AUTO: 1.3 X10E3/UL (ref 0.7–3.1)
LYMPHOCYTES NFR BLD AUTO: 22 %
MCH RBC QN AUTO: 29.1 PG (ref 26.6–33)
MCHC RBC AUTO-ENTMCNC: 33.9 G/DL (ref 31.5–35.7)
MCV RBC AUTO: 86 FL (ref 79–97)
MONOCYTES # BLD AUTO: 0.4 X10E3/UL (ref 0.1–0.9)
MONOCYTES NFR BLD AUTO: 7 %
NEUTROPHILS # BLD AUTO: 4 X10E3/UL (ref 1.4–7)
NEUTROPHILS NFR BLD AUTO: 67 %
PLATELET # BLD AUTO: 221 X10E3/UL (ref 150–450)
POTASSIUM SERPL-SCNC: 4.1 MMOL/L (ref 3.5–5.2)
PROT SERPL-MCNC: 6.7 G/DL (ref 6–8.5)
PTH-INTACT SERPL-MCNC: 33 PG/ML (ref 15–65)
RBC # BLD AUTO: 4.85 X10E6/UL (ref 3.77–5.28)
SL AMB VLDL CHOLESTEROL CALC: 15 MG/DL (ref 5–40)
SODIUM SERPL-SCNC: 140 MMOL/L (ref 134–144)
TRIGL SERPL-MCNC: 82 MG/DL (ref 0–149)
VIT A SERPL-MCNC: 36.9 UG/DL (ref 18.9–57.3)
VIT B1 BLD-SCNC: 145.2 NMOL/L (ref 66.5–200)
WBC # BLD AUTO: 5.9 X10E3/UL (ref 3.4–10.8)
ZINC SERPL-MCNC: 83 UG/DL (ref 44–115)

## 2022-05-16 RX ORDER — ERGOCALCIFEROL 1.25 MG/1
50000 CAPSULE ORAL
Qty: 24 CAPSULE | Refills: 0 | Status: SHIPPED | OUTPATIENT
Start: 2022-05-16

## 2022-05-16 NOTE — TELEPHONE ENCOUNTER
Hello  This pt is coming for B12 but she needs updated labs(b-12,iron panel) for me to submit for auth  Thank you    [place] entered and voicemail left for patient to have lab work done prior to next B-12 injection       FYI to oncology finance

## 2022-05-19 DIAGNOSIS — E66.9 OBESITY, CLASS I, BMI 30-34.9: Primary | ICD-10-CM

## 2022-05-19 DIAGNOSIS — R63.5 WEIGHT GAIN STATUS POST GASTRIC BYPASS: ICD-10-CM

## 2022-05-19 DIAGNOSIS — Z98.84 WEIGHT GAIN STATUS POST GASTRIC BYPASS: ICD-10-CM

## 2022-05-19 RX ORDER — TOPIRAMATE 25 MG/1
TABLET ORAL
Qty: 60 TABLET | Refills: 1 | Status: SHIPPED | OUTPATIENT
Start: 2022-05-19 | End: 2022-06-27

## 2022-06-02 ENCOUNTER — OFFICE VISIT (OUTPATIENT)
Dept: FAMILY MEDICINE CLINIC | Facility: CLINIC | Age: 37
End: 2022-06-02
Payer: COMMERCIAL

## 2022-06-02 VITALS
HEIGHT: 66 IN | TEMPERATURE: 97.8 F | BODY MASS INDEX: 31.02 KG/M2 | SYSTOLIC BLOOD PRESSURE: 128 MMHG | WEIGHT: 193 LBS | DIASTOLIC BLOOD PRESSURE: 68 MMHG | RESPIRATION RATE: 18 BRPM | HEART RATE: 63 BPM | OXYGEN SATURATION: 99 %

## 2022-06-02 DIAGNOSIS — F41.9 ANXIETY: Primary | ICD-10-CM

## 2022-06-02 DIAGNOSIS — E66.9 OBESITY, CLASS I, BMI 30-34.9: ICD-10-CM

## 2022-06-02 PROCEDURE — 3008F BODY MASS INDEX DOCD: CPT | Performed by: FAMILY MEDICINE

## 2022-06-02 PROCEDURE — 1036F TOBACCO NON-USER: CPT | Performed by: FAMILY MEDICINE

## 2022-06-02 PROCEDURE — 99214 OFFICE O/P EST MOD 30 MIN: CPT | Performed by: FAMILY MEDICINE

## 2022-06-02 NOTE — PROGRESS NOTES
Assessment/Plan:    No problem-specific Assessment & Plan notes found for this encounter  Diagnoses and all orders for this visit:    Anxiety    Obesity, Class I, BMI 30-34 9        Subjective:      Patient ID: Marisol Farley is a 42 y o  female  This is a follow-up appointment for this 75-year-old female who is being treated for anxiety related to weight gain after having bariatric bypass  The patient recently had her Mirena removed by her OB Gyne  Patient believes that this is helping with her anxiety and hopefully continued weight loss  Since her last appointment with me she has lost approximately 2 lb  Patient is also joined a gym and is exercising regularly also  The patient is on Celexa and she believes this medication is helping with her anxiety symptoms  She denies any new complaints today  The following portions of the patient's history were reviewed and updated as appropriate: allergies, current medications, past family history, past medical history, past social history, past surgical history and problem list     Review of Systems   HENT: Negative  Eyes: Negative  Respiratory: Negative  Cardiovascular: Negative  Gastrointestinal: Negative  Endocrine: Negative  Musculoskeletal: Negative  Allergic/Immunologic: Negative  Neurological: Negative  Hematological: Negative  Psychiatric/Behavioral: Negative  All other systems reviewed and are negative  Objective:      /68 (BP Location: Left arm, Patient Position: Sitting, Cuff Size: Adult)   Pulse 63   Temp 97 8 °F (36 6 °C) (Tympanic)   Resp 18   Ht 5' 6" (1 676 m)   Wt 87 5 kg (193 lb)   LMP 05/19/2022 (Approximate)   SpO2 99%   BMI 31 15 kg/m²          Physical Exam  Constitutional:       Comments: Patient is obese with a BMI of 31 15   Cardiovascular:      Rate and Rhythm: Normal rate and regular rhythm  Heart sounds: Normal heart sounds     Pulmonary:      Effort: Pulmonary effort is normal       Breath sounds: Normal breath sounds  Musculoskeletal:         General: Normal range of motion  Neurological:      General: No focal deficit present  Mental Status: She is alert and oriented to person, place, and time  Psychiatric:         Mood and Affect: Mood normal          Behavior: Behavior normal          Thought Content:  Thought content normal          Judgment: Judgment normal

## 2022-06-02 NOTE — PATIENT INSTRUCTIONS
The patient's anxiety is better controlled with Celexa  She will continue on the same medication  The patient's obesity is improving with some weight loss after starting a gym and seeing her weight loss management doctor  She was started on Topamax to help with her weight loss  The patient will follow-up with me in 3 months for a check on her anxiety  She has enough medications until that time

## 2022-06-14 DIAGNOSIS — E61.1 IRON DEFICIENCY: Primary | ICD-10-CM

## 2022-06-14 DIAGNOSIS — K91.2 POSTSURGICAL MALABSORPTION: ICD-10-CM

## 2022-06-14 RX ORDER — CYANOCOBALAMIN 1000 UG/ML
1000 INJECTION INTRAMUSCULAR; SUBCUTANEOUS ONCE
OUTPATIENT
Start: 2022-06-16

## 2022-06-16 ENCOUNTER — HOSPITAL ENCOUNTER (OUTPATIENT)
Dept: INFUSION CENTER | Facility: HOSPITAL | Age: 37
Discharge: HOME | End: 2022-06-16
Attending: INTERNAL MEDICINE

## 2022-06-25 DIAGNOSIS — R63.5 WEIGHT GAIN STATUS POST GASTRIC BYPASS: ICD-10-CM

## 2022-06-25 DIAGNOSIS — Z98.84 WEIGHT GAIN STATUS POST GASTRIC BYPASS: ICD-10-CM

## 2022-06-25 DIAGNOSIS — E66.9 OBESITY, CLASS I, BMI 30-34.9: ICD-10-CM

## 2022-06-27 RX ORDER — TOPIRAMATE 25 MG/1
TABLET ORAL
Qty: 60 TABLET | Refills: 1 | Status: SHIPPED | OUTPATIENT
Start: 2022-06-27 | End: 2022-07-11 | Stop reason: SDUPTHER

## 2022-07-11 ENCOUNTER — OFFICE VISIT (OUTPATIENT)
Dept: BARIATRICS | Facility: CLINIC | Age: 37
End: 2022-07-11
Payer: COMMERCIAL

## 2022-07-11 VITALS
BODY MASS INDEX: 29.35 KG/M2 | HEART RATE: 54 BPM | WEIGHT: 182.6 LBS | SYSTOLIC BLOOD PRESSURE: 110 MMHG | DIASTOLIC BLOOD PRESSURE: 70 MMHG | HEIGHT: 66 IN

## 2022-07-11 DIAGNOSIS — Z98.84 WEIGHT GAIN STATUS POST GASTRIC BYPASS: ICD-10-CM

## 2022-07-11 DIAGNOSIS — E66.9 OBESITY, CLASS I, BMI 30-34.9: Primary | ICD-10-CM

## 2022-07-11 DIAGNOSIS — E66.3 OVERWEIGHT: ICD-10-CM

## 2022-07-11 DIAGNOSIS — R63.5 WEIGHT GAIN STATUS POST GASTRIC BYPASS: ICD-10-CM

## 2022-07-11 PROCEDURE — 99214 OFFICE O/P EST MOD 30 MIN: CPT | Performed by: FAMILY MEDICINE

## 2022-07-11 RX ORDER — TOPIRAMATE 25 MG/1
50 TABLET ORAL DAILY
Qty: 60 TABLET | Refills: 2 | Status: SHIPPED | OUTPATIENT
Start: 2022-07-11 | End: 2022-10-11

## 2022-07-11 NOTE — PROGRESS NOTES
Assessment/Plan:  Marisol was seen today for follow-up  Diagnoses and all orders for this visit:    Obesity, Class I, BMI 30-34 9  -     topiramate (TOPAMAX) 25 mg tablet; Take 2 tablets (50 mg total) by mouth daily TAKE 1 TAB BY MOUTH AT BEDTIME FOR 7 DAYS THEN IF TOLERATED INCREASE TO 2 TAB BY MOUTH AT BEDTIME    Overweight  -     topiramate (TOPAMAX) 25 mg tablet; Take 2 tablets (50 mg total) by mouth daily TAKE 1 TAB BY MOUTH AT BEDTIME FOR 7 DAYS THEN IF TOLERATED INCREASE TO 2 TAB BY MOUTH AT BEDTIME    Weight gain status post gastric bypass  -     topiramate (TOPAMAX) 25 mg tablet; Take 2 tablets (50 mg total) by mouth daily TAKE 1 TAB BY MOUTH AT BEDTIME FOR 7 DAYS THEN IF TOLERATED INCREASE TO 2 TAB BY MOUTH AT BEDTIME    Patient is status post gastric bypass  She did have weight regain  Currently she is on Topamax 50 mg daily  Has improved her diet, has improved her activity level and has been able to lose weight  Weight has gone from the obesity category to the overweight category  Denies having any side effects from the medication  Continue Topamax  Patient congratulated and encouraged to keep up the good work  Goals:  Food log (ie ) www myfitnesspal com,sparkpeople  com,loseit com,calorieking  com,etc  , No sugary beverages  At least 64oz of water daily  , Increase physical activity by 10 minutes daily and Gradually increase physical activity to a goal of 5 days per week for 30 minutes of MODERATE intensity PLUS 2 days per week of FULL BODY resistance training    Total time spent: 30 minutes with >50% face-to-face time with the patient  Follow up in approximately 3 months with Non-Surgical Physician/Advanced Practitioner  Subjective:   Chief Complaint   Patient presents with    Follow-up     Pt is here for MWM f/u  Patient ID: Juliana Shelton  is a 42 y o  female with excess weight/obesity here to pursue weight management    Patient is pursuing Conservative Program  Most recent notes and records were reviewed  Patient presents for weight management follow-up  Currently on Topamax 50 mg daily  Experiencing no side effects  Medication has decreased her appetite and she has been able to lose some weight  She has cut back on her snacking due to the appetite decreased  She is also start exercise more by going to the gym     -Initial weight loss goal of 5-10% weight loss for improved health  Wt Readings from Last 10 Encounters:   07/11/22 82 8 kg (182 lb 9 6 oz)   06/02/22 87 5 kg (193 lb)   05/03/22 88 3 kg (194 lb 9 6 oz)   04/21/22 87 6 kg (193 lb 3 2 oz)   04/21/22 87 5 kg (193 lb)   03/17/22 84 8 kg (187 lb)   02/24/22 84 4 kg (186 lb)   01/27/22 83 9 kg (185 lb)   12/02/21 79 9 kg (176 lb 3 2 oz)   10/28/21 77 6 kg (171 lb)     Initial weight: 194 6lbs  Current weight: 182 6lbs  Change in weight: -12lbs    B- protein shake  S- no  L- protein shake w/ chicken  S- no  D- meat w/ small portion of carb (1/8-1/4cup)  S- no  Drinks- 96oz water daily, no longer sipping soda (no soda currently)   Alcohol- rare use  Exercise- gym 4-5 days/wk 60min      The following portions of the patient's history were reviewed and updated as appropriate: allergies, current medications, past family history, past medical history, past social history, past surgical history, and problem list     Review of Systems   Constitutional: Negative for fever  Respiratory: Negative for shortness of breath  Cardiovascular: Negative for chest pain  Objective:  /70 (BP Location: Left arm, Patient Position: Sitting, Cuff Size: Large)   Pulse (!) 54   Ht 5' 6" (1 676 m)   Wt 82 8 kg (182 lb 9 6 oz)   LMP 06/11/2022   BMI 29 47 kg/m²   Constitutional: Well-developed, well-nourished and Overweight Body mass index is 29 47 kg/m²  Dax Rojas HEENT: No conjunctival injection  Pulmonary: No increased work of breathing or signs of respiratory distress  CV: Well-perfused  GI: Overweight  Non-distended  MSK: No edema   Neuro: Oriented to person, place and time  Normal Speech  Normal gait  Psych: Normal affect and mood       Labs and Imaging  Most recent labs and imaging reviewed

## 2022-07-27 ENCOUNTER — TELEPHONE (OUTPATIENT)
Dept: HEMATOLOGY ONCOLOGY | Facility: CLINIC | Age: 37
End: 2022-07-27

## 2022-07-27 NOTE — TELEPHONE ENCOUNTER
Spoke to patient for lab reminder  Patient unable to have labs and wished to reschedule  Patient rescheduled for later date

## 2022-09-08 ENCOUNTER — OFFICE VISIT (OUTPATIENT)
Dept: FAMILY MEDICINE CLINIC | Facility: CLINIC | Age: 37
End: 2022-09-08
Payer: COMMERCIAL

## 2022-09-08 VITALS
WEIGHT: 189 LBS | HEART RATE: 67 BPM | RESPIRATION RATE: 18 BRPM | BODY MASS INDEX: 30.51 KG/M2 | DIASTOLIC BLOOD PRESSURE: 62 MMHG | OXYGEN SATURATION: 98 % | SYSTOLIC BLOOD PRESSURE: 92 MMHG | TEMPERATURE: 98.1 F

## 2022-09-08 DIAGNOSIS — F41.9 ANXIETY: Primary | ICD-10-CM

## 2022-09-08 DIAGNOSIS — K12.0 CANKER SORES ORAL: ICD-10-CM

## 2022-09-08 DIAGNOSIS — E66.09 CLASS 1 OBESITY DUE TO EXCESS CALORIES WITHOUT SERIOUS COMORBIDITY WITH BODY MASS INDEX (BMI) OF 30.0 TO 30.9 IN ADULT: ICD-10-CM

## 2022-09-08 PROCEDURE — 99214 OFFICE O/P EST MOD 30 MIN: CPT | Performed by: FAMILY MEDICINE

## 2022-09-08 RX ORDER — LIDOCAINE HYDROCHLORIDE 20 MG/ML
15 SOLUTION OROPHARYNGEAL 4 TIMES DAILY PRN
Qty: 100 ML | Refills: 1 | Status: SHIPPED | OUTPATIENT
Start: 2022-09-08

## 2022-09-08 NOTE — PROGRESS NOTES
Assessment/Plan:    No problem-specific Assessment & Plan notes found for this encounter  Diagnoses and all orders for this visit:    Anxiety    Canker sores oral  -     Lidocaine Viscous HCl (XYLOCAINE) 2 % mucosal solution; Swish and spit 15 mL 4 (four) times a day as needed for mouth pain or discomfort    Class 1 obesity due to excess calories without serious comorbidity with body mass index (BMI) of 30 0 to 30 9 in adult        Subjective:      Patient ID: Idania Gaffney is a 42 y o  female  This is a 42 year female who is following up for anxiety  The patient has anxiety started when she started to gain weight after having a gastric bypass  The patient has been doing well with the SSRI  She also has [facility] for [facility]  The patient has been started on Topamax as a appetite suppressant  The patient will also become more to help with her weight loss  She denies any other problems  The following portions of the patient's history were reviewed and updated as appropriate: allergies, current medications, past family history, past medical history, past social history, past surgical history and problem list     Review of Systems   Constitutional: Negative  Respiratory: Negative  Cardiovascular: Negative  Gastrointestinal: Negative  Endocrine: Negative  Musculoskeletal: Negative  Psychiatric/Behavioral: The patient is nervous/anxious            Objective:      BP 92/62   Pulse 67   Temp 98 1 °F (36 7 °C)   Resp 18   Wt 85 7 kg (189 lb)   SpO2 98%   BMI 30 51 kg/m²          Physical Exam

## 2022-09-08 NOTE — PATIENT INSTRUCTIONS
The patient's anxiety is well controlled with Celexa 20 mg daily  The patient has also returned to [facility] nutritional   She has been started Topamax to help with appetite suppression  The patient also has a canker sore and is requesting something the pain and was given viscous lidocaine  The patient will follow-up with me in 3 months to recheck her anxiety and weight

## 2022-09-30 LAB
BASOPHILS # BLD AUTO: 0 X10E3/UL (ref 0–0.2)
BASOPHILS # BLD AUTO: 0 X10E3/UL (ref 0–0.2)
BASOPHILS NFR BLD AUTO: 1 %
BASOPHILS NFR BLD AUTO: 1 %
EOSINOPHIL # BLD AUTO: 0.1 X10E3/UL (ref 0–0.4)
EOSINOPHIL # BLD AUTO: 0.1 X10E3/UL (ref 0–0.4)
EOSINOPHIL NFR BLD AUTO: 1 %
EOSINOPHIL NFR BLD AUTO: 2 %
ERYTHROCYTE [DISTWIDTH] IN BLOOD BY AUTOMATED COUNT: 11.9 % (ref 11.7–15.4)
ERYTHROCYTE [DISTWIDTH] IN BLOOD BY AUTOMATED COUNT: 12.2 % (ref 11.7–15.4)
HCT VFR BLD AUTO: 38.2 % (ref 34–46.6)
HCT VFR BLD AUTO: 38.6 % (ref 34–46.6)
HGB BLD-MCNC: 12.6 G/DL (ref 11.1–15.9)
HGB BLD-MCNC: 12.7 G/DL (ref 11.1–15.9)
IMM GRANULOCYTES # BLD: 0 X10E3/UL (ref 0–0.1)
IMM GRANULOCYTES # BLD: 0 X10E3/UL (ref 0–0.1)
IMM GRANULOCYTES NFR BLD: 0 %
IMM GRANULOCYTES NFR BLD: 0 %
LYMPHOCYTES # BLD AUTO: 0.6 X10E3/UL (ref 0.7–3.1)
LYMPHOCYTES # BLD AUTO: 0.7 X10E3/UL (ref 0.7–3.1)
LYMPHOCYTES NFR BLD AUTO: 13 %
LYMPHOCYTES NFR BLD AUTO: 13 %
MCH RBC QN AUTO: 27.2 PG (ref 26.6–33)
MCH RBC QN AUTO: 27.7 PG (ref 26.6–33)
MCHC RBC AUTO-ENTMCNC: 32.6 G/DL (ref 31.5–35.7)
MCHC RBC AUTO-ENTMCNC: 33.2 G/DL (ref 31.5–35.7)
MCV RBC AUTO: 83 FL (ref 79–97)
MCV RBC AUTO: 83 FL (ref 79–97)
MONOCYTES # BLD AUTO: 0.5 X10E3/UL (ref 0.1–0.9)
MONOCYTES # BLD AUTO: 0.5 X10E3/UL (ref 0.1–0.9)
MONOCYTES NFR BLD AUTO: 10 %
MONOCYTES NFR BLD AUTO: 9 %
NEUTROPHILS # BLD AUTO: 3.7 X10E3/UL (ref 1.4–7)
NEUTROPHILS # BLD AUTO: 4.2 X10E3/UL (ref 1.4–7)
NEUTROPHILS NFR BLD AUTO: 75 %
NEUTROPHILS NFR BLD AUTO: 75 %
PLATELET # BLD AUTO: 162 X10E3/UL (ref 150–450)
PLATELET # BLD AUTO: 168 X10E3/UL (ref 150–450)
RBC # BLD AUTO: 4.59 X10E6/UL (ref 3.77–5.28)
RBC # BLD AUTO: 4.64 X10E6/UL (ref 3.77–5.28)
WBC # BLD AUTO: 5 X10E3/UL (ref 3.4–10.8)
WBC # BLD AUTO: 5.5 X10E3/UL (ref 3.4–10.8)

## 2022-10-01 LAB
FERRITIN SERPL-MCNC: 11 NG/ML (ref 15–150)
VIT B12 SERPL-MCNC: 351 PG/ML (ref 232–1245)
VIT B12 SERPL-MCNC: 352 PG/ML (ref 232–1245)

## 2022-10-06 ENCOUNTER — TELEPHONE (OUTPATIENT)
Dept: HEMATOLOGY ONCOLOGY | Facility: MEDICAL CENTER | Age: 37
End: 2022-10-06

## 2022-10-06 NOTE — TELEPHONE ENCOUNTER
Marisol was a no show for her 10/6/22 Jovana Clamp f/u   Spoke with the patient, she was in agreement to r/s to 11/15/22 8am

## 2022-10-11 ENCOUNTER — OFFICE VISIT (OUTPATIENT)
Dept: LAB | Facility: HOSPITAL | Age: 37
End: 2022-10-11
Payer: COMMERCIAL

## 2022-10-11 ENCOUNTER — OFFICE VISIT (OUTPATIENT)
Dept: BARIATRICS | Facility: CLINIC | Age: 37
End: 2022-10-11
Payer: COMMERCIAL

## 2022-10-11 VITALS
HEIGHT: 66 IN | SYSTOLIC BLOOD PRESSURE: 114 MMHG | HEART RATE: 57 BPM | WEIGHT: 190.4 LBS | DIASTOLIC BLOOD PRESSURE: 74 MMHG | BODY MASS INDEX: 30.6 KG/M2

## 2022-10-11 DIAGNOSIS — E66.9 OBESITY, CLASS I, BMI 30-34.9: Primary | ICD-10-CM

## 2022-10-11 DIAGNOSIS — E66.9 OBESITY, CLASS I, BMI 30-34.9: ICD-10-CM

## 2022-10-11 PROCEDURE — 93005 ELECTROCARDIOGRAM TRACING: CPT

## 2022-10-11 PROCEDURE — 99214 OFFICE O/P EST MOD 30 MIN: CPT | Performed by: FAMILY MEDICINE

## 2022-10-11 NOTE — PATIENT INSTRUCTIONS
Goals: Food log (ie ) www myfitnesspal com,sparkpeople  com,loseit com,calorieking  com,etc  , No sugary beverages  At least 64oz of water daily  , Increase physical activity by 10 minutes daily and Gradually increase physical activity to a goal of 5 days per week for 30 minutes of MODERATE intensity PLUS 2 days per week of FULL BODY resistance training  Phentermine has as potential side effects of increased heart rate, increased blood pressure, palpitations, headache, dizziness, insomnia, altered mood, abdominal upset, and dry mouth  Notify the provider with change in mood  ER with thoughts of harming yourself or others  Phentermine should be stopped prior to surgery  Notify the provider with any changes in vision  Have EKG performed  If EKG is unremarkable then phentermine can be started

## 2022-10-11 NOTE — PROGRESS NOTES
Assessment/Plan:  Marisol was seen today for follow-up  Diagnoses and all orders for this visit:    Obesity, Class I, BMI 30-34 9  -     ECG 12 lead; Future    Unfortunately patient has gained weight since her last visit which I suspect is a combination of discontinuation of Topamax and decreased physical activity  While patient may benefit from [facility] as she also carries a diagnosis of PCOS her insurance may not cover the medication following cessation of the discount program that the manufacture is offering  As an alternative I recommended starting phentermine  Potential side effects of phentermine discussed  Patient has no contraindications to phentermine use  She is agreeable to trying the medication  Check EKG and start medication if he EKG unremarkable  Patient previously stopped Topamax on her own and can consider adding Topamax to phentermine at a later date as well  Goals:  Food log (ie ) www myfitnesspal com,sparkpeople  com,loseit com,calorieking  com,etc  , No sugary beverages  At least 64oz of water daily  , Increase physical activity by 10 minutes daily and Gradually increase physical activity to a goal of 5 days per week for 30 minutes of MODERATE intensity PLUS 2 days per week of FULL BODY resistance training  Phentermine has as potential side effects of increased heart rate, increased blood pressure, palpitations, headache, dizziness, insomnia, altered mood, abdominal upset, and dry mouth  Notify the provider with change in mood  ER with thoughts of harming yourself or others  Phentermine should be stopped prior to surgery  Notify the provider with any changes in vision  Have EKG performed  If EKG is unremarkable then phentermine can be started  Total time spent: 30 minutes with >50% face-to-face time with the patient  Follow up in approximately 3 months with Non-Surgical Physician/Advanced Practitioner      Subjective:   Chief Complaint   Patient presents with   • Follow-up     Pt is here today for MWM f/u  Patient ID: Duey Babinski  is a 42 y o  female with excess weight/obesity here to pursue weight management  Patient is pursuing Conservative Program    Most recent notes and records were reviewed  Patient presents for medical weight management follow-up  More recently was on Topamax to help with weight loss  Patient states it was effective while she was exercising but then patient stopped exercising and gained weight  Stopped taking Topamax  She is interested in starting [employer]  She tells me she can get a savings card that will make the medication affordable for one year  Nutrition has remained the same  Only exercise has declined due to time constraints between work and her child     -Initial weight loss goal of 5-10% weight loss for improved health  Wt Readings from Last 10 Encounters:   10/11/22 86 4 kg (190 lb 6 4 oz)   09/08/22 85 7 kg (189 lb)   07/11/22 82 8 kg (182 lb 9 6 oz)   06/02/22 87 5 kg (193 lb)   05/03/22 88 3 kg (194 lb 9 6 oz)   04/21/22 87 6 kg (193 lb 3 2 oz)   04/21/22 87 5 kg (193 lb)   03/17/22 84 8 kg (187 lb)   02/24/22 84 4 kg (186 lb)   01/27/22 83 9 kg (185 lb)     Initial weight: 194 6lbs  Current weight: 190 4lbs (+7 8lbs from last MWM OV)  Change in weight: -4 2lbs      B- protein shake  S- no  L- protein shake w/ chicken  S- no  D- meat w/ small portion of carb (1/8-1/4cup)  S- no  Drinks- 96oz water daily, no longer sipping soda (no soda currently)   Alcohol- rare use  Exercise- no gym currently due to time restriction with work and kids      The following portions of the patient's history were reviewed and updated as appropriate: allergies, current medications, past family history, past medical history, past social history, past surgical history, and problem list     Review of Systems   Constitutional: Negative for fever  Respiratory: Negative for shortness of breath  Cardiovascular: Negative for chest pain         Objective:  BP 114/74 (BP Location: Left arm, Patient Position: Sitting, Cuff Size: Standard)   Pulse 57   Ht 5' 6" (1 676 m)   Wt 86 4 kg (190 lb 6 4 oz)   LMP 10/09/2022   BMI 30 73 kg/m²   Constitutional: Well-developed, well-nourished and Obese Body mass index is 30 73 kg/m²  Lajean Cassette HEENT: No conjunctival injection  Pulmonary: No increased work of breathing or signs of respiratory distress  CV: Well-perfused  GI: Obese  Non-distended  MSK: No edema   Neuro: Oriented to person, place and time  Normal Speech  Normal gait  Psych: Normal affect and mood  Labs and Imaging  Recent labs and imaging have been personally reviewed    Lab Results   Component Value Date    WBC 5 5 09/30/2022    HGB 12 7 09/30/2022    HCT 38 2 09/30/2022    MCV 83 09/30/2022     09/30/2022     Lab Results   Component Value Date    SODIUM 140 05/04/2022    K 4 1 05/04/2022     05/04/2022    CO2 24 05/04/2022    AGAP 7 07/21/2020    BUN 11 05/04/2022    CREATININE 0 75 05/04/2022    GLUC 85 05/04/2022    CALCIUM 8 6 07/21/2020    AST 25 05/04/2022    ALT 38 (H) 05/04/2022    TP 6 7 05/04/2022    TBILI 0 4 05/04/2022    EGFR 104 05/04/2022     Lab Results   Component Value Date    HGBA1C 5 1 05/04/2022     Lab Results   Component Value Date    [id number] 2 400 08/09/2017    TSH 2 330 12/02/2021     Lab Results   Component Value Date    CHOLESTEROL 157 05/04/2022     Lab Results   Component Value Date    HDL 65 05/04/2022     Lab Results   Component Value Date    TRIG 82 05/04/2022     Lab Results   Component Value Date    LDLCALC 77 05/04/2022

## 2022-10-12 LAB
ATRIAL RATE: 54 BPM
P AXIS: 33 DEGREES
PR INTERVAL: 146 MS
QRS AXIS: -2 DEGREES
QRSD INTERVAL: 94 MS
QT INTERVAL: 432 MS
QTC INTERVAL: 409 MS
T WAVE AXIS: 9 DEGREES
VENTRICULAR RATE: 54 BPM

## 2022-10-12 PROCEDURE — 93010 ELECTROCARDIOGRAM REPORT: CPT | Performed by: INTERNAL MEDICINE

## 2022-10-13 DIAGNOSIS — E66.9 OBESITY, CLASS I, BMI 30-34.9: Primary | ICD-10-CM

## 2022-10-13 RX ORDER — PHENTERMINE HYDROCHLORIDE 37.5 MG/1
TABLET ORAL
Qty: 30 TABLET | Refills: 0 | Status: SHIPPED | OUTPATIENT
Start: 2022-10-13

## 2022-11-02 ENCOUNTER — TELEPHONE (OUTPATIENT)
Dept: BARIATRICS | Facility: CLINIC | Age: 37
End: 2022-11-02

## 2022-11-14 ENCOUNTER — TELEPHONE (OUTPATIENT)
Dept: BARIATRICS | Facility: CLINIC | Age: 37
End: 2022-11-14

## 2022-11-14 NOTE — TELEPHONE ENCOUNTER
msg sent to pt via [employer]  I just tried calling your phone but was unable to leave a message due to voicemail being full  The medication you are on now requires that you see the doctor every 4 weeks to be able to continue on the medication  Please give the office a call back at [phone] so we can schedule your next appointment before 12/14/22  If not the medication will not be refilled without a appointment

## 2022-11-15 DIAGNOSIS — E61.1 IRON DEFICIENCY: Primary | ICD-10-CM

## 2022-11-15 DIAGNOSIS — E53.8 B12 DEFICIENCY: ICD-10-CM

## 2022-11-21 ENCOUNTER — OFFICE VISIT (OUTPATIENT)
Dept: BARIATRICS | Facility: CLINIC | Age: 37
End: 2022-11-21

## 2022-11-21 VITALS
SYSTOLIC BLOOD PRESSURE: 120 MMHG | DIASTOLIC BLOOD PRESSURE: 84 MMHG | BODY MASS INDEX: 28.09 KG/M2 | HEIGHT: 66 IN | HEART RATE: 68 BPM | RESPIRATION RATE: 16 BRPM | WEIGHT: 174.8 LBS

## 2022-11-21 DIAGNOSIS — E66.9 OBESITY, CLASS I, BMI 30-34.9: Primary | ICD-10-CM

## 2022-11-21 DIAGNOSIS — E66.3 OVERWEIGHT: ICD-10-CM

## 2022-11-21 DIAGNOSIS — L98.7 EXCESS SKIN: ICD-10-CM

## 2022-11-21 RX ORDER — AMOXICILLIN 500 MG/1
CAPSULE ORAL
COMMUNITY
Start: 2022-11-17

## 2022-11-21 NOTE — PROGRESS NOTES
Assessment/Plan:  Marisol was seen today for follow-up  Diagnoses and all orders for this visit:    Obesity, Class I, BMI 30-34 9  Overweight  Previously with obesity and now with overweight  Currently on phentermine  Tolerating medication well  Continue phentermine  Patient advised not to skip meals  Excess skin  -     Ambulatory referral to Plastic Surgery; Future  Referral to Plastic surgery for evaluation  Goals:  Food log (ie ) www myfitnesspal com,sparkpeople  com,loseit com,calorieking  com,etc  ,   No sugary beverages  At least 64oz of water daily  ,   Increase physical activity by 10 minutes daily and   Gradually increase physical activity to a goal of 5 days per week for 30 minutes of MODERATE intensity PLUS 2 days per week of FULL BODY resistance training  Do not skip meals  Continue phentermine  Phentermine has as potential side effects of increased heart rate, increased blood pressure, palpitations, headache, dizziness, insomnia, altered mood, abdominal upset, and dry mouth  Notify the provider with change in mood  ER with thoughts of harming yourself or others  Phentermine should be stopped prior to surgery  Notify the provider with any changes in vision    Total time spent: 30 minutes with >50% face-to-face time with the patient  Follow up in approximately 2 months with Non-Surgical Physician/Advanced Practitioner  Subjective:   Chief Complaint   Patient presents with   • Follow-up     Med check       Patient ID: Fabián Hope  is a 42 y o  female with excess weight/obesity here to pursue weight management  Patient is pursuing Conservative Program    Most recent notes and records were reviewed  Patient presents for medical weight management follow-up  Does have history of gastric bypass followed by weight regain  Weight regain started shortly after having Mirena placed  Close were starting to not fit anymore      Patient was started on phentermine during last visit  Tolerating medication well  Denies palpitations or chest pain  Denies insomnia  Would like to continue the medication  Has been able to lose weight  Does complain of excess skin since having bariatric surgery  Would consider having skin removed  She is unsure how to pursue skin removal     -Initial weight loss goal of 5-10% weight loss for improved health  Wt Readings from Last 10 Encounters:   11/21/22 79 3 kg (174 lb 12 8 oz)   10/11/22 86 4 kg (190 lb 6 4 oz)   09/08/22 85 7 kg (189 lb)   07/11/22 82 8 kg (182 lb 9 6 oz)   06/02/22 87 5 kg (193 lb)   05/03/22 88 3 kg (194 lb 9 6 oz)   04/21/22 87 6 kg (193 lb 3 2 oz)   04/21/22 87 5 kg (193 lb)   03/17/22 84 8 kg (187 lb)   02/24/22 84 4 kg (186 lb)     Initial weight: 194 6lbs  Current weight: 174 8lbs (-15 6lbs since last MWM OV)  Change in weight: -19 8lbs      B- protein shake  S- no  L- protein shake w/ additional protein to eat such as chicken  S- no  D- meat w/ small portion of carb (1/8-1/4cup) or skips  S- no  Drinks- 96oz water daily, no longer sipping soda (no soda currently)   Alcohol- rare use  Exercise- no gym currently due to time restriction with work and kids    The following portions of the patient's history were reviewed and updated as appropriate: allergies, current medications, past family history, past medical history, past social history, past surgical history, and problem list     Review of Systems   Constitutional: Negative for fever  Respiratory: Negative for shortness of breath  Cardiovascular: Negative for chest pain and palpitations  Objective:  /84 (BP Location: Left arm, Patient Position: Sitting, Cuff Size: Standard)   Pulse 68   Resp 16   Ht 5' 6" (1 676 m)   Wt 79 3 kg (174 lb 12 8 oz)   BMI 28 21 kg/m²   Constitutional: Well-developed, well-nourished and Overweight Body mass index is 28 21 kg/m²  Tigre Abraham HEENT: No conjunctival injection    Pulmonary: No increased work of breathing or signs of respiratory distress  CV: Well-perfused  GI: Overweight  Non-distended  MSK: No edema   Neuro: Oriented to person, place and time  Normal Speech  Normal gait  Psych: Normal affect and mood  Labs and Imaging  Recent labs and imaging have been personally reviewed    Lab Results   Component Value Date    WBC 5 5 09/30/2022    HGB 12 7 09/30/2022    HCT 38 2 09/30/2022    MCV 83 09/30/2022     09/30/2022     Lab Results   Component Value Date    SODIUM 140 05/04/2022    K 4 1 05/04/2022     05/04/2022    CO2 24 05/04/2022    AGAP 7 07/21/2020    BUN 11 05/04/2022    CREATININE 0 75 05/04/2022    GLUC 85 05/04/2022    CALCIUM 8 6 07/21/2020    AST 25 05/04/2022    ALT 38 (H) 05/04/2022    TP 6 7 05/04/2022    TBILI 0 4 05/04/2022    EGFR 104 05/04/2022     Lab Results   Component Value Date    HGBA1C 5 1 05/04/2022     Lab Results   Component Value Date    ZWG0XZPUPHOO 2 400 08/09/2017    TSH 2 330 12/02/2021     Lab Results   Component Value Date    CHOLESTEROL 157 05/04/2022     Lab Results   Component Value Date    HDL 65 05/04/2022     Lab Results   Component Value Date    TRIG 82 05/04/2022     Lab Results   Component Value Date    LDLCALC 77 05/04/2022

## 2022-11-21 NOTE — PATIENT INSTRUCTIONS
Goals: Food log (ie ) www myfitnesspal com,sparkpeople  com,loseit com,calorieking  com,etc  ,   No sugary beverages  At least 64oz of water daily  ,   Increase physical activity by 10 minutes daily and   Gradually increase physical activity to a goal of 5 days per week for 30 minutes of MODERATE intensity PLUS 2 days per week of FULL BODY resistance training  Do not skip meals  Continue phentermine  Phentermine has as potential side effects of increased heart rate, increased blood pressure, palpitations, headache, dizziness, insomnia, altered mood, abdominal upset, and dry mouth  Notify the provider with change in mood  ER with thoughts of harming yourself or others  Phentermine should be stopped prior to surgery   Notify the provider with any changes in vision

## 2022-11-25 DIAGNOSIS — F41.9 ANXIETY: ICD-10-CM

## 2022-11-25 RX ORDER — CITALOPRAM 20 MG/1
TABLET ORAL
Qty: 30 TABLET | Refills: 5 | Status: SHIPPED | OUTPATIENT
Start: 2022-11-25

## 2022-12-01 ENCOUNTER — TELEPHONE (OUTPATIENT)
Dept: PLASTIC SURGERY | Facility: CLINIC | Age: 37
End: 2022-12-01

## 2022-12-01 NOTE — TELEPHONE ENCOUNTER
Spoke to patient and emailed criteria for panniculectomy consultation  Patient needs 2 more months of documentation, December and January and we can bring her in for an appointment

## 2022-12-03 ENCOUNTER — HOSPITAL ENCOUNTER (EMERGENCY)
Facility: HOSPITAL | Age: 37
Discharge: HOME | End: 2022-12-03
Attending: EMERGENCY MEDICINE

## 2022-12-03 VITALS
SYSTOLIC BLOOD PRESSURE: 142 MMHG | RESPIRATION RATE: 18 BRPM | TEMPERATURE: 99 F | DIASTOLIC BLOOD PRESSURE: 77 MMHG | OXYGEN SATURATION: 97 % | HEART RATE: 86 BPM | WEIGHT: 178.8 LBS | BODY MASS INDEX: 28.86 KG/M2

## 2022-12-03 DIAGNOSIS — J11.1 INFLUENZA-LIKE ILLNESS: Primary | ICD-10-CM

## 2022-12-03 LAB
FLUAV RNA RESP QL NAA+PROBE: NEGATIVE
FLUBV RNA RESP QL NAA+PROBE: NEGATIVE
RSV RNA RESP QL NAA+PROBE: NEGATIVE
S PYO DNA THROAT QL NAA+PROBE: NOT DETECTED
SARS-COV-2 RNA RESP QL NAA+PROBE: POSITIVE

## 2022-12-03 RX ORDER — ACETAMINOPHEN 325 MG/1
650 TABLET ORAL ONCE
Status: COMPLETED | OUTPATIENT
Start: 2022-12-03 | End: 2022-12-03

## 2022-12-03 RX ADMIN — ACETAMINOPHEN 650 MG: 325 TABLET, FILM COATED ORAL at 12:56

## 2022-12-03 NOTE — Clinical Note
Amita Beach was seen and treated in our emergency department on 12/3/2022  may return sooner if symptoms subside    Diagnosis: flu like symptoms    Marisol  may return to work on return date  She may return on this date: 12/06/2022         If you have any questions or concerns, please don't hesitate to call        Sheryl Agarwal PA-C    ______________________________           _______________          _______________  Hospital Representative                              Date                                Time

## 2022-12-03 NOTE — ED PROVIDER NOTES
History  Chief Complaint   Patient presents with   • Fever - 9 weeks to 74 years     Started with scratchy throat yesterday , today awoke " felt like I was hit by a truck " and had fever 101 6  Went to work and did a flu antigen test which was negative  No Tylenol/Motrin  Son has strep  Patient completed 10 days of Amoxicillin 2 weeks ago for dental infection, needs a root canal       26-year-old female presenting today for evaluation of a scratchy throat yesterday as well as fevers, generalized body aches and fatigue that started yesterday  Has also noted some headaches, back pain  Was recently on amoxicillin for dental infection, no work was being performed  Has not taken any medication today  Has not noticed any worsening dental pain or facial swelling  Patient's family members were recently diagnosed with strep throat  Denies nausea vomiting, diarrhea, abdominal pain, shortness breath, cough, congestion, rash pain, neck stiffness  Prior to Admission Medications   Prescriptions Last Dose Informant Patient Reported? Taking?    CALCIUM CITRATE PO   Yes No   Sig: Take by mouth 3 (three) times a day   Patient not taking: Reported on 2/24/2022   CALCIUM PO   Yes No   Sig: Take by mouth   Lidocaine Viscous HCl (XYLOCAINE) 2 % mucosal solution   No No   Sig: Swish and spit 15 mL 4 (four) times a day as needed for mouth pain or discomfort   Patient not taking: Reported on 10/11/2022   Multiple Vitamin (multivitamin) tablet   Yes No   Sig: Take 1 tablet by mouth daily Bariatric one a day   amoxicillin (AMOXIL) 500 mg capsule   Yes No   citalopram (CeleXA) 20 mg tablet   No No   Sig: TAKE 1 TABLET DAILY   ergocalciferol (VITAMIN D2) 50,000 units   No No   Sig: Take 1 capsule (50,000 Units total) by mouth 2 (two) times a week with meals   fexofenadine (ALLEGRA) 180 MG tablet   No No   Sig: Take 1 tablet (180 mg total) by mouth daily   furosemide (LASIX) 20 mg tablet   No No   Sig: One tab daily   phentermine (ADIPEX-P) 37 5 MG tablet   No No   Sig: Take 1 tablet (37 5 mg total) by mouth every morning      Facility-Administered Medications: None       Past Medical History:   Diagnosis Date   • Allergic rhinitis    • Anemia     ok currently   • Anxiety    • Asthma     activity induced   • Benign neoplasm of skin of trunk    • Morbid obesity with BMI of 40 0-44 9, adult (HCC)    • Polycystic ovary syndrome    • Postgastrectomy malabsorption    • Skin tag    • Sleep apnea    • Tinea versicolor        Past Surgical History:   Procedure Laterality Date   • CERVICAL CERCLAGE     •  SECTION  2017   • PARAESOPHAGEAL HERNIA REPAIR N/A 2020    Procedure: REPAIR HERNIA PARAESOPHAGEAL  LAPAROSCOPIC;  Surgeon: Ai Luna MD;  Location: [address] [place] [address];  Service: Bariatrics   • LA LAP GASTRIC BYPASS/JENNIFER-EN-Y N/A 2020    Procedure: BYPASS GASTRIC  JENNIFER-EN-Y LAPAROSCOPIC;  Surgeon: Ai Luna MD;  Location: [address] [place] [address];  Service: Bariatrics   • LA LAP,CHOLECYSTECTOMY N/A 2021    Procedure: CHOLECYSTECTOMY LAPAROSCOPIC;  Surgeon: Gayatri Tucker MD;  Location: [address] [place] [address];  Service: General   • TONSILLECTOMY     • TUBAL LIGATION         Family History   Problem Relation Age of Onset   • Diabetes Mother    • Heart disease Mother         CHF   • Hypertension Mother    • Kidney cancer Mother         2020   • Cancer Father         kidney   • Aneurysm Father         AAA   • COPD Father    • Kidney disease Father    • Kidney cancer Father            • No Known Problems Sister    • Cancer Maternal Grandmother         bladder   • Cancer Paternal Grandfather         brain   • Stroke Paternal Grandfather    • No Known Problems Sister    • No Known Problems Sister      I have reviewed and agree with the history as documented      E-Cigarette/Vaping   • E-Cigarette Use Never User      E-Cigarette/Vaping Substances   • Nicotine No    • THC No    • CBD No    • Flavoring No    • Other No    • Unknown No      Social History     Tobacco Use   • Smoking status: Never   • Smokeless tobacco: Never   Vaping Use   • Vaping Use: Never used   Substance Use Topics   • Alcohol use: Not Currently     Comment: rarely   • Drug use: No       Review of Systems   Constitutional: Positive for fatigue and fever  Negative for appetite change and chills  HENT: Positive for sore throat  Negative for congestion, dental problem, ear pain, facial swelling, mouth sores, postnasal drip, sinus pressure and trouble swallowing  Eyes: Negative  Respiratory: Positive for cough  Negative for chest tightness, shortness of breath and wheezing  Cardiovascular: Negative  Negative for chest pain  Gastrointestinal: Negative for abdominal distention, abdominal pain, blood in stool, constipation, diarrhea, nausea and vomiting  Genitourinary: Negative  Musculoskeletal: Positive for myalgias  Negative for arthralgias, neck pain and neck stiffness  Skin: Negative  Negative for rash  Neurological: Positive for headaches  Negative for dizziness, tremors, seizures, syncope, facial asymmetry, speech difficulty, weakness, light-headedness and numbness  All other systems reviewed and are negative  Physical Exam  Physical Exam  Vitals and nursing note reviewed  Constitutional:       Appearance: Normal appearance  She is normal weight  HENT:      Head: Normocephalic and atraumatic  Right Ear: Tympanic membrane, ear canal and external ear normal       Left Ear: Tympanic membrane, ear canal and external ear normal       Nose: Nose normal       Mouth/Throat:      Mouth: Mucous membranes are moist       Pharynx: Oropharynx is clear  No posterior oropharyngeal erythema  Eyes:      Conjunctiva/sclera: Conjunctivae normal    Neck:      Comments: No nuchal rigidity  Cardiovascular:      Rate and Rhythm: Normal rate  Pulses: Normal pulses  Heart sounds: Normal heart sounds     Pulmonary:      Effort: Pulmonary effort is normal       Breath sounds: Normal breath sounds  Comments: S PO2 is 97% indicating adequate oxygenation  Abdominal:      General: Abdomen is flat  Bowel sounds are normal  There is no distension  Musculoskeletal:         General: No deformity  Normal range of motion  Cervical back: Normal range of motion and neck supple  Skin:     General: Skin is warm and dry  Capillary Refill: Capillary refill takes less than 2 seconds  Findings: No rash  Neurological:      General: No focal deficit present  Mental Status: She is alert and oriented to person, place, and time  Mental status is at baseline  Psychiatric:         Mood and Affect: Mood normal          Behavior: Behavior normal          Thought Content: Thought content normal          Judgment: Judgment normal          Vital Signs  ED Triage Vitals [12/03/22 1249]   Temperature Pulse Respirations Blood Pressure SpO2   (!) 100 8 °F (38 2 °C) 95 18 142/77 99 %      Temp Source Heart Rate Source Patient Position - Orthostatic VS BP Location FiO2 (%)   Tympanic Monitor Sitting Left arm --      Pain Score       --           Vitals:    12/03/22 1249 12/03/22 1345   BP: 142/77    Pulse: 95 86   Patient Position - Orthostatic VS: Sitting          Visual Acuity      ED Medications  Medications   acetaminophen (TYLENOL) tablet 650 mg (650 mg Oral Given 12/3/22 1256)       Diagnostic Studies  Results Reviewed     Procedure Component Value Units Date/Time    FLU/RSV/COVID - if FLU/RSV clinically relevant [[id number]] Collected: 12/03/22 1343    Lab Status: No result Specimen: Nares from Nose     Strep A PCR [[id number]] Collected: 12/03/22 1343    Lab Status: No result Specimen: Throat                  No orders to display              Procedures  Procedures         ED Course                                             MDM  Number of Diagnoses or Management Options  Influenza-like illness  Diagnosis management comments: Patient would like swab  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 )    Patient is informed to return to the emergency department for worsening of symptoms and was given proper education regarding their diagnosis and symptoms  Otherwise the patient is informed to follow up with their primary care doctor for re-evaluation  The patient verbalizes understanding and agrees with above assessment and plan  All questions were answered  Please Note: [employer] voice recognition software may have been used in the creation of this document  Wrong words or sound a like substitutions may have occurred due to the inherent limitations of the voice software  Amount and/or Complexity of Data Reviewed  Clinical lab tests: ordered  Review and summarize past medical records: yes  Independent visualization of images, tracings, or specimens: yes        Disposition  Final diagnoses:   Influenza-like illness     Time reflects when diagnosis was documented in both MDM as applicable and the Disposition within this note     Time User Action Codes Description Comment    12/3/2022  1:39 PM Erwin Mesa Add [J11 1] Influenza-like illness       ED Disposition     ED Disposition   Discharge    Condition   Stable    Date/Time   Sat Dec 3, 2022  1:39 PM    Comment   Marisol Weber discharge to home/self care                 Follow-up Information     Follow up With Specialties Details Why Contact Info Additional [id number] [facility] Emergency Department Emergency Medicine Go to  If symptoms worsen, otherwise please follow up with your family doctor [address] [place] [address] [address] [id number] [address] [address] Emergency Department, [place], [place], [address]          Discharge Medication List as of 12/3/2022  1:40 PM      CONTINUE these medications which have NOT CHANGED    Details   amoxicillin (AMOXIL) 500 mg capsule Starting Thu 11/17/2022, Historical Med      CALCIUM CITRATE PO Take by mouth 3 (three) times a day, Historical Med      CALCIUM PO Take by mouth, Historical Med      citalopram (CeleXA) 20 mg tablet TAKE 1 TABLET DAILY, Normal      ergocalciferol (VITAMIN D2) 50,000 units Take 1 capsule (50,000 Units total) by mouth 2 (two) times a week with meals, Starting Mon 5/16/2022, Normal      fexofenadine (ALLEGRA) 180 MG tablet Take 1 tablet (180 mg total) by mouth daily, Starting Thu 4/21/2022, Normal      furosemide (LASIX) 20 mg tablet One tab daily, Normal      Lidocaine Viscous HCl (XYLOCAINE) 2 % mucosal solution Swish and spit 15 mL 4 (four) times a day as needed for mouth pain or discomfort, Starting Thu 9/8/2022, Normal      Multiple Vitamin (multivitamin) tablet Take 1 tablet by mouth daily Bariatric one a day, Historical Med      phentermine (ADIPEX-P) 37 5 MG tablet Take 1 tablet (37 5 mg total) by mouth every morning, Starting Mon 11/14/2022, Normal             No discharge procedures on file      PDMP Review       Value Time User    PDMP Reviewed  Yes 11/21/2022  9:45 AM Geovany Bello DO          ED Provider  Electronically Signed by           Rafat Ren PA-C  12/03/22 0922

## 2022-12-08 ENCOUNTER — OFFICE VISIT (OUTPATIENT)
Dept: FAMILY MEDICINE CLINIC | Facility: CLINIC | Age: 37
End: 2022-12-08

## 2022-12-08 VITALS
WEIGHT: 174.1 LBS | DIASTOLIC BLOOD PRESSURE: 84 MMHG | SYSTOLIC BLOOD PRESSURE: 128 MMHG | HEART RATE: 69 BPM | HEIGHT: 66 IN | OXYGEN SATURATION: 97 % | TEMPERATURE: 97 F | BODY MASS INDEX: 27.98 KG/M2

## 2022-12-08 DIAGNOSIS — F41.9 ANXIETY: ICD-10-CM

## 2022-12-08 DIAGNOSIS — M54.2 MYOFASCIAL NECK PAIN: ICD-10-CM

## 2022-12-08 DIAGNOSIS — E66.3 OVERWEIGHT (BMI 25.0-29.9): ICD-10-CM

## 2022-12-08 DIAGNOSIS — Z11.4 ENCOUNTER FOR SCREENING FOR HUMAN IMMUNODEFICIENCY VIRUS (HIV): ICD-10-CM

## 2022-12-08 DIAGNOSIS — Z11.59 ENCOUNTER FOR HEPATITIS C SCREENING TEST FOR LOW RISK PATIENT: Primary | ICD-10-CM

## 2022-12-09 NOTE — PROGRESS NOTES
Name: Idania Gaffney      : 1985      MRN: [id number]  Encounter Provider: Edy Wong DO  Encounter Date: 2022   Encounter department: [facility]     1  Encounter for hepatitis C screening test for low risk patient  -     Hepatitis C antibody; Future  -     Hepatitis C antibody    2  Encounter for screening for human immunodeficiency virus (HIV)  -     Human Immunodeficiency Virus 1/2 Antigen / Antibody ( Fourth Generation) with Reflex Testing; Future  -     Human Immunodeficiency Virus 1/2 Antigen / Antibody ( Fourth Generation) with Reflex Testing           Subjective      This is a follow-up appointment for this 20-year-old female with past medical history of anxiety and depression and past history of gastric bypass  The patient has recently lost some weight with the use of phentermine prescribed by her gastric bypass surgeon  With the use of phentermine her anxiety has increased  The patient was aware that the phentermine could cause anxiety symptoms  The patient reports that she may get off the phentermine in the future  In the meantime she has taken Celexa for anxiety and depression  The patient also states increased neck pain most likely related to stress at home and at work  The patient denies any injuries to her neck  She has tried Tylenol without any relief  She has tried a massager with some relief in the past   The patient also had a ER visit recently for dental pain  She was placed on antibiotics for probable dental abscess and has seen a dentist since that time  Review of Systems   HENT: Negative  Eyes: Negative  Respiratory: Negative  Cardiovascular: Negative  Gastrointestinal: Negative  Endocrine: Negative  Musculoskeletal: Positive for neck pain  Allergic/Immunologic: Negative  Neurological: Negative  Hematological: Negative  Psychiatric/Behavioral: Negative      All other systems reviewed and are negative  Current Outpatient Medications on File Prior to Visit   Medication Sig   • amoxicillin (AMOXIL) 500 mg capsule    • CALCIUM PO Take by mouth   • citalopram (CeleXA) 20 mg tablet TAKE 1 TABLET DAILY   • ergocalciferol (VITAMIN D2) 50,000 units Take 1 capsule (50,000 Units total) by mouth 2 (two) times a week with meals   • fexofenadine (ALLEGRA) 180 MG tablet Take 1 tablet (180 mg total) by mouth daily   • furosemide (LASIX) 20 mg tablet One tab daily   • Multiple Vitamin (multivitamin) tablet Take 1 tablet by mouth daily Bariatric one a day   • phentermine (ADIPEX-P) 37 5 MG tablet Take 1 tablet (37 5 mg total) by mouth every morning   • CALCIUM CITRATE PO Take by mouth 3 (three) times a day (Patient not taking: Reported on 2/24/2022)   • Lidocaine Viscous HCl (XYLOCAINE) 2 % mucosal solution Swish and spit 15 mL 4 (four) times a day as needed for mouth pain or discomfort (Patient not taking: Reported on 12/8/2022)       Objective     /84 (BP Location: Left arm, Patient Position: Sitting, Cuff Size: Adult)   Pulse 69   Temp (!) 97 °F (36 1 °C) (Tympanic)   Ht 5' 6" (1 676 m)   Wt 79 kg (174 lb 1 6 oz)   SpO2 97%   BMI 28 10 kg/m²     Physical Exam  Constitutional:       Comments: The patient is overweight with BMI of 28 10   Cardiovascular:      Rate and Rhythm: Normal rate and regular rhythm  Heart sounds: Normal heart sounds  Pulmonary:      Effort: Pulmonary effort is normal       Breath sounds: Normal breath sounds  Musculoskeletal:         General: Normal range of motion  Neurological:      Mental Status: She is alert  Psychiatric:         Mood and Affect: Mood normal          Behavior: Behavior normal          Thought Content:  Thought content normal          Judgment: Judgment normal        Eliu Andujar,

## 2022-12-09 NOTE — PATIENT INSTRUCTIONS
This patient is experiencing anxiety most likely related to the phentermine side effect  She is using phentermine to help control her weight gain  The patient states she is thinking of getting off the phentermine  She will continue on Celexa to treat her anxiety and depression  The patient also has neck pain probably of myofascial origin secondary to stress at home and at work  The patient can continue to try muscle massage with her massager machine at home  She also can try warm moist compresses to the back of her neck to relieve some of the muscle spasms and neck pain  There is no trauma to the neck so I do not believe she needs any x-rays  The patient can think about potential evaluation by PT and let me know  The patient should follow up with her gastric surgeon as already planned  Her follow-up appointment with me should be in 3 months to check her weight but also other medical issues    She can call here at any time if any questions

## 2022-12-26 DIAGNOSIS — F41.9 ANXIETY: ICD-10-CM

## 2022-12-26 DIAGNOSIS — E66.9 OBESITY, CLASS I, BMI 30-34.9: ICD-10-CM

## 2022-12-27 RX ORDER — CITALOPRAM 20 MG/1
20 TABLET ORAL DAILY
Qty: 30 TABLET | Refills: 0 | Status: SHIPPED | OUTPATIENT
Start: 2022-12-27

## 2022-12-27 RX ORDER — TRIAMCINOLONE ACETONIDE 1 MG/G
CREAM TOPICAL 2 TIMES DAILY
COMMUNITY
Start: 2022-11-30 | End: 2023-11-30

## 2022-12-27 RX ORDER — PHENTERMINE HYDROCHLORIDE 37.5 MG/1
TABLET ORAL
Qty: 30 TABLET | Refills: 0 | Status: SHIPPED | OUTPATIENT
Start: 2022-12-27

## 2022-12-27 RX ORDER — NYSTATIN 100000 U/G
CREAM TOPICAL 2 TIMES DAILY
COMMUNITY
Start: 2022-11-30 | End: 2023-11-30

## 2023-01-12 ENCOUNTER — OFFICE VISIT (OUTPATIENT)
Dept: HEMATOLOGY ONCOLOGY | Facility: MEDICAL CENTER | Age: 38
End: 2023-01-12

## 2023-01-12 ENCOUNTER — TELEPHONE (OUTPATIENT)
Dept: HEMATOLOGY ONCOLOGY | Facility: MEDICAL CENTER | Age: 38
End: 2023-01-12

## 2023-01-12 VITALS
BODY MASS INDEX: 27.14 KG/M2 | DIASTOLIC BLOOD PRESSURE: 68 MMHG | SYSTOLIC BLOOD PRESSURE: 104 MMHG | HEART RATE: 71 BPM | OXYGEN SATURATION: 98 % | HEIGHT: 66 IN | RESPIRATION RATE: 16 BRPM | TEMPERATURE: 98.3 F | WEIGHT: 168.9 LBS

## 2023-01-12 DIAGNOSIS — D50.0 IRON DEFICIENCY ANEMIA DUE TO CHRONIC BLOOD LOSS: Primary | ICD-10-CM

## 2023-01-12 DIAGNOSIS — E53.8 B12 DEFICIENCY: ICD-10-CM

## 2023-01-12 DIAGNOSIS — K91.2 POSTSURGICAL MALABSORPTION: ICD-10-CM

## 2023-01-12 DIAGNOSIS — N92.0 MENORRHAGIA WITH REGULAR CYCLE: ICD-10-CM

## 2023-01-12 RX ORDER — SODIUM CHLORIDE 9 MG/ML
20 INJECTION, SOLUTION INTRAVENOUS ONCE
Status: CANCELLED | OUTPATIENT
Start: 2023-01-19

## 2023-01-12 RX ORDER — CYANOCOBALAMIN 1000 UG/ML
1000 INJECTION, SOLUTION INTRAMUSCULAR; SUBCUTANEOUS ONCE
OUTPATIENT
Start: 2023-01-26

## 2023-01-12 NOTE — PROGRESS NOTES
[facility] [place] [id number]  Oncology Progress Note  Maximino Tuttle, 1985, [id number]  1/12/2023        Assessment/Plan:   1  Iron deficiency anemia; 2  Menorrhagia with regular cycle  Ferritin of 7 0 ng/mL, MCV 77 4 low, iron saturation low at 9%, hemoglobin 11 2, low    This is a 25-year-old female with past medical history of Teodoro-en-Y gastric bypass as well as menorrhagia who is now presented with a drop in hemoglobin 2 g from last year, low MCV, low ferritin at 7 and low iron saturation at 9%  I recommended that the patient go on monthly Feraheme infusions for 4 treatments  Patient has received Feraheme in the past and has tolerated it well without significant side effects  Because of deficiency in ferritin, dropping from 71-7 over a year, is in part due to malabsorption condition created by gastric bypass but also do significantly heavy periods changing supersized tampon every 45 minutes during the first 3 days of her menstrual cycle  Patient had her IUD removed secondary to weight gain  She is interested in [facility]  [place]  versus ablation to the uterus to help with monthly bleeding  Patient will follow-up with them over the next 3 months and I will see her back to check and see if continuation of monthly infusions are necessary  Patient voiced understanding to the above  Regimen:  Feraheme 510 mg IV q  28 days  B12 1000 mcg IM q  28 days + oral B12 supplement as outlined below  - Iron Panel (Includes Ferritin, Iron Sat%, Iron, and TIBC); Future  - CBC and differential; Future  - Vitamin B12; Future  - CBC and differential  - Vitamin B12    3  Postsurgical malabsorption; 4  B12 deficiency  Teodoro-en-Y gastric bypass on 7/20/2020    Patient's B12 levels have dropped somewhat since her last appointment a year ago    Patient was advised to start daily B12 supplementation with 2000 mg daily for 1 month then to reduce to every other month  The patient will be in the infusion center for iron, additional IM B12 injections will be given at each visit  - Iron Panel (Includes Ferritin, Iron Sat%, Iron, and TIBC); Future  - CBC and differential; Future  - Vitamin B12; Future  - CBC and differential  - Vitamin B12    The patient is scheduled for follow-up in approximately 4 months  Patient voiced agreement and understanding to the above  Patient knows to call the Hematology/Oncology office with any questions and concerns regarding the above  Goals and Barriers:    Current Goal:   Prolong Survival from Cancer  Barriers: None  Patient's Capacity to Self Care:  Patient able to self care  Michelle Pappas PA-C  Medical Oncology/Hematology  [address]  ______________________________________________________________________________________________________________    Subjective   No chief complaint on file  History of present illness/Cancer History:    This is a 22-year-old female with history of heavy menstrual periods since a teenager who noted worsening issues with iron deficiency after donating blood in 2010   Patient recently underwent bariatric surgery in July 2020 and follow-up appointments demonstrated persistence of iron deficiency anemia and referral was made to Hematology      Patient is presently symptomatic with PICA-ice, on restful sleep, fatigue, hair loss, headaches and irritability   Patient notes that she is taking oral iron and is causing side effects such as constipation      Patient's iron studies prior to bariatric surgery demonstrate a pretty significant iron deficiency   Patient was never anemic and was asked to continue on oral iron supplementation   Since then, patient's symptoms have worsened      Patient also complains of heavy menstrual periods   Patient underwent evaluation with Gynecology  Yodit Harding had ultrasound completed that demonstrated a left ovarian cyst, likely benign without significant pathology within the gynecologic organs   Patient has only ever tried oral contraceptives to help lessen menstrual bleeding but this is not worked   Last time patient tried oral contraceptives was as a teenager  Goyo June is using a super plus tampon every hour during the heavy parts of her menstruation      EGD was completed in December 2019 without significant findings  Goyo June has never had a colonoscopy  Goyo June denies blood loss from her stool or her urine      2/21/20 WBC = 7 7, hemoglobin 12 5, MCV = 76, RDW 15 9, platelet count 696  2/99/75 ferritin = 6  8/12/20 WBC = 6 8, hemoglobin 12 1, hematocrit 30 2, MCV = 77, RDW 15 2, platelet count = 251  10/8/20 WBC = 4 4 hemoglobin = 11 2, MCV = 76, RDW 15 7, platelet count 126, ferritin = 7  12/4 & 12/18/2020: Feraheme x 2 doses  1/2021:  Maintenance Feraheme 510 IV q 28 days    1/5/2021:    WBC = 4 4, hemoglobin 11 8, MCV = 83, RDW = 17 4, platelet  = 825, ferritin =  91, iron saturation  = 27, B12 =  217  After maintenance infusions in February, patient was lost to follow-up      6/4/21 WBC = 5 5, hemoglobin 13 9, hematocrit 42 1, MCV 87, RDW 13 5, platelet count 833, V26= 283     8/11 through 10/7, monthly IV iron   B12 induction x4 weeks followed by monthly B12 injections      10/7/21:  TIBC = 231, iron saturation = 45%, ferritin = 99, hemoglobin = 14 0, hematocrit = 43 1, platelets = 678, WBC = 5 08  Feraheme discontinued      1/20/22 hemoglobin = 13 9, hematocrit = 42 8, ferritin = 71, TIBC = 244, B12 = 400    1/4/2023 WBC 6 1, hemoglobin 11 2, low    Lab Results   Component Value Date    WBC 5 5 09/30/2022    HGB 12 7 09/30/2022    HCT 38 2 09/30/2022    MCV 83 09/30/2022     09/30/2022     Lab Results   Component Value Date    SODIUM 140 05/04/2022    K 4 1 05/04/2022     05/04/2022    CO2 24 05/04/2022    AGAP 7 07/21/2020    BUN 11 05/04/2022    CREATININE 0 75 05/04/2022    GLUC 85 05/04/2022 CALCIUM 8 6 07/21/2020    AST 25 05/04/2022    ALT 38 (H) 05/04/2022    TP 6 7 05/04/2022    TBILI 0 4 05/04/2022    EGFR 104 05/04/2022       Interval history: Patient feels tired  She is craving ice however with sensitive teeth, she is trying to avoid eating it  Plan menses are heavy since having IUD removed in September 2022 this was removed secondary to weight gain  Review of Systems   Constitutional: Positive for fatigue  Negative for appetite change, fever and unexpected weight change  HENT: Negative for nosebleeds  Respiratory: Negative for cough, choking and shortness of breath  Negative hemoptysis  Cardiovascular: Negative for chest pain, palpitations and leg swelling  Gastrointestinal: Negative  Negative for abdominal distention, abdominal pain, anal bleeding, blood in stool, constipation, diarrhea, nausea and vomiting  Endocrine: Negative  Negative for cold intolerance  Genitourinary: Positive for menstrual problem  Negative for hematuria, vaginal bleeding, vaginal discharge and vaginal pain  Musculoskeletal: Negative  Negative for arthralgias, myalgias, neck pain and neck stiffness  Skin: Negative  Negative for color change, pallor and rash  Allergic/Immunologic: Negative  Negative for immunocompromised state  Neurological: Negative  Negative for weakness and headaches  Hematological: Negative for adenopathy  Does not bruise/bleed easily  All other systems reviewed and are negative        Current Outpatient Medications:   •  acetaminophen-codeine (TYLENOL #3) 300-30 mg per tablet, , Disp: , Rfl:   •  amoxicillin (AMOXIL) 500 mg capsule, , Disp: , Rfl:   •  CALCIUM CITRATE PO, Take by mouth 3 (three) times a day, Disp: , Rfl:   •  citalopram (CeleXA) 20 mg tablet, Take 1 tablet (20 mg total) by mouth daily, Disp: 30 tablet, Rfl: 0  •  ergocalciferol (VITAMIN D2) 50,000 units, Take 1 capsule (50,000 Units total) by mouth 2 (two) times a week with meals, Disp: 24 capsule, Rfl: 0  •  fexofenadine (ALLEGRA) 180 MG tablet, Take 1 tablet (180 mg total) by mouth daily, Disp: 30 tablet, Rfl: 5  •  furosemide (LASIX) 20 mg tablet, One tab daily, Disp: 30 tablet, Rfl: 5  •  Multiple Vitamin (multivitamin) tablet, Take 1 tablet by mouth daily Bariatric one a day, Disp: , Rfl:   •  nystatin (MYCOSTATIN) cream, Apply topically 2 (two) times a day, Disp: , Rfl:   •  phentermine (ADIPEX-P) 37 5 MG tablet, TAKE 1 TABLET EVERY MORNING, Disp: 30 tablet, Rfl: 0  •  CALCIUM PO, Take by mouth (Patient not taking: Reported on 1/12/2023), Disp: , Rfl:   •  triamcinolone (KENALOG) 0 1 % cream, Apply topically 2 (two) times a day (Patient not taking: Reported on 1/12/2023), Disp: , Rfl:   Allergies   Allergen Reactions   • Valtrex [Valacyclovir] Swelling       Advance Directive and Living Will:            Objective   /68 (BP Location: Left arm, Patient Position: Sitting, Cuff Size: Standard)   Pulse 71   Temp 98 3 °F (36 8 °C) (Temporal)   Resp 16   Ht 5' 6" (1 676 m)   Wt 76 6 kg (168 lb 14 4 oz)   SpO2 98%   BMI 27 26 kg/m²   Wt Readings from Last 6 Encounters:   01/12/23 76 6 kg (168 lb 14 4 oz)   12/08/22 79 kg (174 lb 1 6 oz)   12/03/22 81 1 kg (178 lb 12 8 oz)   11/21/22 79 3 kg (174 lb 12 8 oz)   10/11/22 86 4 kg (190 lb 6 4 oz)   09/08/22 85 7 kg (189 lb)       Physical Exam  Constitutional:       General: She is not in acute distress  Appearance: She is well-developed  HENT:      Head: Normocephalic and atraumatic  Mouth/Throat:      Pharynx: No oropharyngeal exudate  Eyes:      General: No scleral icterus  Pupils: Pupils are equal, round, and reactive to light  Cardiovascular:      Rate and Rhythm: Normal rate and regular rhythm  Heart sounds: No murmur heard  Pulmonary:      Effort: Pulmonary effort is normal  No respiratory distress  Breath sounds: Normal breath sounds     Abdominal:      General: Bowel sounds are normal  There is no distension  Palpations: Abdomen is soft  Tenderness: There is no abdominal tenderness  Musculoskeletal:         General: Normal range of motion  Cervical back: Normal range of motion  Lymphadenopathy:      Cervical: No cervical adenopathy  Skin:     General: Skin is warm  Coloration: Skin is not pale  Findings: No rash  Neurological:      Mental Status: She is alert and oriented to person, place, and time  Cranial Nerves: No cranial nerve deficit  Psychiatric:         Thought Content: Thought content normal          Pertinent Laboratory Results and Imaging Review:  No visits with results within 3 Week(s) from this visit     Latest known visit with results is:   Admission on 2022, Discharged on 2022   Component Date Value Ref Range Status   • SARS-CoV-2 2022 Positive (A)  Negative Final   • INFLUENZA A PCR 2022 Negative  Negative Final   • INFLUENZA B PCR 2022 Negative  Negative Final   • RSV PCR 2022 Negative  Negative Final   • STREP A PCR 2022 Not Detected  Not Detected Final         The following historical data was reviewed:  Past Medical History:   Diagnosis Date   • Allergic rhinitis    • Anemia     ok currently   • Anxiety    • Asthma     activity induced   • Benign neoplasm of skin of trunk    • Morbid obesity with BMI of 40 0-44 9, adult (HCC)    • Polycystic ovary syndrome    • Postgastrectomy malabsorption    • Skin tag    • Sleep apnea    • Tinea versicolor      Past Surgical History:   Procedure Laterality Date   • CERVICAL CERCLAGE     •  SECTION  2017   • PARAESOPHAGEAL HERNIA REPAIR N/A 2020    Procedure: REPAIR HERNIA PARAESOPHAGEAL  LAPAROSCOPIC;  Surgeon: Chikis Aviles MD;  Location: [address] [place] [address];  Service: Bariatrics   • KS LAPAROSCOPY SURG CHOLECYSTECTOMY N/A 2021    Procedure: CHOLECYSTECTOMY LAPAROSCOPIC;  Surgeon: Halima Montelongo MD;  Location: [address] [place] [address];  Service: General   • KS LAPS GSTR RSTCV PX W/BYP JENNIFER-EN-Y LIMB <150 CM N/A 7/20/2020    Procedure: BYPASS GASTRIC  JENNIFER-EN-Y LAPAROSCOPIC;  Surgeon: Donovan Duverney, MD;  Location: [address] [place] [address];  Service: Bariatrics   • TONSILLECTOMY     • TUBAL LIGATION  2017     Social History     Socioeconomic History   • Marital status:      Spouse name: None   • Number of children: None   • Years of education: None   • Highest education level: None   Occupational History   • None   Tobacco Use   • Smoking status: Never   • Smokeless tobacco: Never   Vaping Use   • Vaping Use: Never used   Substance and Sexual Activity   • Alcohol use: Not Currently     Comment: rarely   • Drug use: No   • Sexual activity: Not Currently   Other Topics Concern   • None   Social History Narrative   • None     Social Determinants of Health     Financial Resource Strain: Not on file   Food Insecurity: Not on file   Transportation Needs: Not on file   Physical Activity: Not on file   Stress: Not on file   Social Connections: Not on file   Intimate Partner Violence: Not on file   Housing Stability: Not on file     Family History   Problem Relation Age of Onset   • Diabetes Mother    • Heart disease Mother         CHF   • Hypertension Mother    • Kidney cancer Mother         5/2020   • Cancer Father         kidney   • Aneurysm Father         AAA   • COPD Father    • Kidney disease Father    • Kidney cancer Father         2010   • No Known Problems Sister    • Cancer Maternal Grandmother         bladder   • Cancer Paternal Grandfather         brain   • Stroke Paternal Grandfather    • No Known Problems Sister    • No Known Problems Sister        Please note: This report has been generated by a voice recognition software system  Therefore there may be syntax, spelling, and/or grammatical errors  Please call if you have any questions

## 2023-01-12 NOTE — TELEPHONE ENCOUNTER
While we try to accommodate patient requests, our priority is to schedule treatment according to Doctor's orders and site availability  Does the Provider use the intake sheet or checkout note? What would be a preferred day of the week that would work best for your infusion appointment? THURS  Do you prefer mornings or afternoons for your appointments? EARLIEST MORNING POSSIBLE  Are there any days or dates that do not work for your schedule, including any upcoming vacations? NO  We are going to try our best to schedule you at the infusion center closest to your home  In the event that we are unable to what would be your next preferred infusion site or sites? 1  [facility]  2    3      Do you have transportation to take you to all of your appointments?  YES  Would you like the infusion center to draw labs from your port? (disregard if patient doesn't have a port or need labs for infusion appointment) NO

## 2023-01-13 ENCOUNTER — TELEPHONE (OUTPATIENT)
Dept: HEMATOLOGY ONCOLOGY | Facility: CLINIC | Age: 38
End: 2023-01-13

## 2023-01-13 DIAGNOSIS — D50.8 IRON DEFICIENCY ANEMIA SECONDARY TO INADEQUATE DIETARY IRON INTAKE: Primary | ICD-10-CM

## 2023-01-13 NOTE — TELEPHONE ENCOUNTER
Hello  This pt needs updated iron labs before I can submit for the feraheme      Thank you     Sofia Osullivan Specialist for Oncology/Hematology  Phone [phone]  Fax [phone]      Iron panel entered  Voicemail left for patient with details and my TEAMs number    Feraheme due 1/19/2023  Patient instructed to go ASAP so Arnulfo  may be obtained prior to appt  FYI to [employer] team

## 2023-01-18 ENCOUNTER — TELEPHONE (OUTPATIENT)
Dept: HEMATOLOGY ONCOLOGY | Facility: MEDICAL CENTER | Age: 38
End: 2023-01-18

## 2023-01-18 DIAGNOSIS — K91.2 POSTSURGICAL MALABSORPTION: ICD-10-CM

## 2023-01-18 DIAGNOSIS — N92.0 MENORRHAGIA WITH REGULAR CYCLE: Primary | ICD-10-CM

## 2023-01-18 DIAGNOSIS — E61.1 IRON DEFICIENCY: ICD-10-CM

## 2023-01-18 RX ORDER — SODIUM CHLORIDE 9 MG/ML
20 INJECTION, SOLUTION INTRAVENOUS ONCE
Status: CANCELLED | OUTPATIENT
Start: 2023-02-02

## 2023-01-18 NOTE — TELEPHONE ENCOUNTER
Patient is aware of schedule update      [facility], will you schedule the rest of them with her when she comes in?

## 2023-01-18 NOTE — TELEPHONE ENCOUNTER
Auth for iron infusion will not be obtained bu appt date  Will send to  to change appt to next week  Patient is aware that reschedule may be necessary due to insurance auth

## 2023-01-19 ENCOUNTER — HOSPITAL ENCOUNTER (OUTPATIENT)
Dept: INFUSION CENTER | Facility: HOSPITAL | Age: 38
Discharge: HOME | End: 2023-01-19
Attending: INTERNAL MEDICINE

## 2023-01-26 ENCOUNTER — HOSPITAL ENCOUNTER (OUTPATIENT)
Dept: INFUSION CENTER | Facility: HOSPITAL | Age: 38
Discharge: HOME | End: 2023-01-26
Attending: INTERNAL MEDICINE

## 2023-01-26 VITALS
DIASTOLIC BLOOD PRESSURE: 79 MMHG | RESPIRATION RATE: 20 BRPM | HEART RATE: 77 BPM | SYSTOLIC BLOOD PRESSURE: 133 MMHG | TEMPERATURE: 99.4 F | OXYGEN SATURATION: 100 %

## 2023-01-26 DIAGNOSIS — E61.1 IRON DEFICIENCY: ICD-10-CM

## 2023-01-26 DIAGNOSIS — Z48.815 ENCOUNTER FOR SURGICAL AFTERCARE FOLLOWING SURGERY OF DIGESTIVE SYSTEM: ICD-10-CM

## 2023-01-26 DIAGNOSIS — E53.8 B12 DEFICIENCY: Primary | ICD-10-CM

## 2023-01-26 DIAGNOSIS — N92.0 MENORRHAGIA WITH REGULAR CYCLE: ICD-10-CM

## 2023-01-26 DIAGNOSIS — K91.2 POSTSURGICAL MALABSORPTION: ICD-10-CM

## 2023-01-26 RX ORDER — CYANOCOBALAMIN 1000 UG/ML
1000 INJECTION, SOLUTION INTRAMUSCULAR; SUBCUTANEOUS ONCE
Status: COMPLETED | OUTPATIENT
Start: 2023-01-26 | End: 2023-01-26

## 2023-01-26 RX ORDER — SODIUM CHLORIDE 9 MG/ML
20 INJECTION, SOLUTION INTRAVENOUS ONCE
Status: COMPLETED | OUTPATIENT
Start: 2023-01-26 | End: 2023-01-26

## 2023-01-26 RX ORDER — SODIUM CHLORIDE 9 MG/ML
20 INJECTION, SOLUTION INTRAVENOUS ONCE
OUTPATIENT
Start: 2023-02-02

## 2023-01-26 RX ORDER — CYANOCOBALAMIN 1000 UG/ML
1000 INJECTION, SOLUTION INTRAMUSCULAR; SUBCUTANEOUS ONCE
OUTPATIENT
Start: 2023-02-23

## 2023-01-26 RX ADMIN — SODIUM CHLORIDE 20 ML/HR: 9 INJECTION, SOLUTION INTRAVENOUS at 09:11

## 2023-01-26 RX ADMIN — CYANOCOBALAMIN 1000 MCG: 1000 INJECTION, SOLUTION INTRAMUSCULAR; SUBCUTANEOUS at 08:50

## 2023-01-26 RX ADMIN — FERUMOXYTOL 510 MG: 510 INJECTION INTRAVENOUS at 09:11

## 2023-02-02 ENCOUNTER — OFFICE VISIT (OUTPATIENT)
Dept: BARIATRICS | Facility: CLINIC | Age: 38
End: 2023-02-02

## 2023-02-02 VITALS
BODY MASS INDEX: 27.48 KG/M2 | WEIGHT: 171 LBS | RESPIRATION RATE: 18 BRPM | HEIGHT: 66 IN | HEART RATE: 64 BPM | DIASTOLIC BLOOD PRESSURE: 80 MMHG | SYSTOLIC BLOOD PRESSURE: 118 MMHG

## 2023-02-02 DIAGNOSIS — E66.9 OBESITY, CLASS I, BMI 30-34.9: Primary | ICD-10-CM

## 2023-02-02 DIAGNOSIS — E66.3 OVERWEIGHT: ICD-10-CM

## 2023-02-02 NOTE — PROGRESS NOTES
Assessment/Plan:  Marisol was seen today for follow-up  Diagnoses and all orders for this visit:    Obesity, Class I, BMI 30-34 9    Overweight    Currently on phentermine 37 5 mg daily  Patient is going to restart the medication as she ran out of the medication this past weekend  Based on the PDMP it does appear that the medication is ready for pickup  Patient has had less weight loss from last visit up until this visit which I suspect is secondary to muscle gain along with height loss due to resistance training  Patient encouraged to keep up the good work  Other recommendations as below  Goals:  Food log (ie ) www myfitnesspal com,sparkpeople  com,loseit com,calorieking  com,etc  , No sugary beverages  At least 64oz of water daily  , Increase physical activity by 10 minutes daily and Gradually increase physical activity to a goal of 5 days per week for 30 minutes of MODERATE intensity PLUS 2 days per week of FULL BODY resistance training      Follow up in approximately 2 months with Non-Surgical Physician/Advanced Practitioner  Subjective:   Chief Complaint   Patient presents with   • Follow-up     MWM F/U, waist measurement 31in from 39in (7/11/22)       Patient ID: Zehra Valles  is a 42 y o  female with excess weight/obesity here to pursue weight management  Patient is pursuing [facility] Program    Most recent notes and records were reviewed  Patient presents for medical weight management follow-up  Does have history of gastric bypass surgery  Currently on phentermine 37 5 mg daily  Ran out of medication over the weekend  Otherwise when she was taking the medication she was tolerating it well without any palpitations, chest pain, headache or insomnia  Did not  the prescription that was recently faxed to her pharmacy  Has stopped skipping meals  Has started going back to the gym again for the past month or so which includes cardio and resistance training    Clothes have been fitting better     -Initial weight loss goal of 5-10% weight loss for improved health  Wt Readings from Last 10 Encounters:   02/02/23 77 6 kg (171 lb)   01/12/23 76 6 kg (168 lb 14 4 oz)   12/08/22 79 kg (174 lb 1 6 oz)   12/03/22 81 1 kg (178 lb 12 8 oz)   11/21/22 79 3 kg (174 lb 12 8 oz)   10/11/22 86 4 kg (190 lb 6 4 oz)   09/08/22 85 7 kg (189 lb)   07/11/22 82 8 kg (182 lb 9 6 oz)   06/02/22 87 5 kg (193 lb)   05/03/22 88 3 kg (194 lb 9 6 oz)     Initial weight: 194 6lbs  Current weight: 171lbs (-3 8lbs since last MWM OV)  Change in weight: -23 6lbs      B- protein shake  S- protein shake  L- protein to eat such as chicken from cafeteria  S- no  D- meat w/ small portion of carb (1/8-1/4cup)  S- no  ABQWHU- 81-87EY water daily  Alcohol- rare use  Exercise- gym 3x/wk 60min    The following portions of the patient's history were reviewed and updated as appropriate: allergies, current medications, past family history, past medical history, past social history, past surgical history, and problem list     Review of Systems   Constitutional: Negative for fever  Respiratory: Negative for shortness of breath  Cardiovascular: Negative for chest pain and palpitations  Objective:  /80 (BP Location: Left arm, Patient Position: Sitting, Cuff Size: Standard)   Pulse 64   Resp 18   Ht 5' 6" (1 676 m)   Wt 77 6 kg (171 lb)   BMI 27 60 kg/m²   Constitutional: Well-developed, well-nourished and Overweight Body mass index is 27 6 kg/m²  Soundra Eaves HEENT: No conjunctival injection  Pulmonary: No increased work of breathing or signs of respiratory distress  CV: Well-perfused  GI: Overweight  Non-distended  MSK: No edema   Neuro: Oriented to person, place and time  Normal Speech  Normal gait  Psych: Normal affect and mood  Labs and Imaging  Recent labs and imaging have been personally reviewed    Lab Results   Component Value Date    WBC 5 5 09/30/2022    HGB 12 7 09/30/2022    HCT 38 2 09/30/2022    MCV 83 09/30/2022     09/30/2022     Lab Results   Component Value Date    SODIUM 140 05/04/2022    K 4 1 05/04/2022     05/04/2022    CO2 24 05/04/2022    AGAP 7 07/21/2020    BUN 11 05/04/2022    CREATININE 0 75 05/04/2022    GLUC 85 05/04/2022    CALCIUM 8 6 07/21/2020    AST 25 05/04/2022    ALT 38 (H) 05/04/2022    TP 6 7 05/04/2022    TBILI 0 4 05/04/2022    EGFR 104 05/04/2022     Lab Results   Component Value Date    HGBA1C 5 1 05/04/2022     Lab Results   Component Value Date    [id number] 2 400 08/09/2017    TSH 2 330 12/02/2021     Lab Results   Component Value Date    CHOLESTEROL 157 05/04/2022     Lab Results   Component Value Date    HDL 65 05/04/2022     Lab Results   Component Value Date    TRIG 82 05/04/2022     Lab Results   Component Value Date    LDLCALC 77 05/04/2022

## 2023-02-09 DIAGNOSIS — E66.9 OBESITY, CLASS I, BMI 30-34.9: ICD-10-CM

## 2023-02-09 NOTE — TELEPHONE ENCOUNTER
Patient called stating she needs a refill for Phentermine medication  Patient states she only has 2 pills left   Patient would like a call when refill has been sent to pharmacy

## 2023-02-10 RX ORDER — PHENTERMINE HYDROCHLORIDE 37.5 MG/1
37.5 TABLET ORAL EVERY MORNING
Qty: 30 TABLET | Refills: 0 | Status: SHIPPED | OUTPATIENT
Start: 2023-02-10

## 2023-02-10 NOTE — TELEPHONE ENCOUNTER
Prior to prescribing the controlled substance, a patient search was performed on the [place] prescription drug monitoring program web site and NO Red Flags identified

## 2023-02-20 ENCOUNTER — TELEPHONE (OUTPATIENT)
Dept: HEMATOLOGY ONCOLOGY | Facility: MEDICAL CENTER | Age: 38
End: 2023-02-20

## 2023-02-20 DIAGNOSIS — K91.2 POSTSURGICAL MALABSORPTION: ICD-10-CM

## 2023-02-20 DIAGNOSIS — E61.1 IRON DEFICIENCY: ICD-10-CM

## 2023-02-20 DIAGNOSIS — N92.0 MENORRHAGIA WITH REGULAR CYCLE: Primary | ICD-10-CM

## 2023-02-20 RX ORDER — SODIUM CHLORIDE 9 MG/ML
20 INJECTION, SOLUTION INTRAVENOUS ONCE
Status: CANCELLED | OUTPATIENT
Start: 2023-02-23

## 2023-02-23 ENCOUNTER — HOSPITAL ENCOUNTER (OUTPATIENT)
Dept: INFUSION CENTER | Facility: HOSPITAL | Age: 38
Discharge: HOME | End: 2023-02-23
Attending: INTERNAL MEDICINE

## 2023-02-23 ENCOUNTER — OFFICE VISIT (OUTPATIENT)
Dept: FAMILY MEDICINE CLINIC | Facility: CLINIC | Age: 38
End: 2023-02-23

## 2023-02-23 VITALS
DIASTOLIC BLOOD PRESSURE: 66 MMHG | RESPIRATION RATE: 16 BRPM | HEART RATE: 57 BPM | TEMPERATURE: 98.8 F | SYSTOLIC BLOOD PRESSURE: 116 MMHG | OXYGEN SATURATION: 99 %

## 2023-02-23 VITALS
WEIGHT: 172 LBS | TEMPERATURE: 97.9 F | DIASTOLIC BLOOD PRESSURE: 81 MMHG | BODY MASS INDEX: 27.76 KG/M2 | HEART RATE: 59 BPM | SYSTOLIC BLOOD PRESSURE: 129 MMHG | OXYGEN SATURATION: 100 % | RESPIRATION RATE: 18 BRPM

## 2023-02-23 DIAGNOSIS — E61.1 IRON DEFICIENCY: ICD-10-CM

## 2023-02-23 DIAGNOSIS — Z48.815 ENCOUNTER FOR SURGICAL AFTERCARE FOLLOWING SURGERY OF DIGESTIVE SYSTEM: ICD-10-CM

## 2023-02-23 DIAGNOSIS — Z98.84 STATUS POST GASTRIC BYPASS FOR OBESITY: ICD-10-CM

## 2023-02-23 DIAGNOSIS — E53.8 B12 DEFICIENCY: Primary | ICD-10-CM

## 2023-02-23 DIAGNOSIS — L98.7 REDUNDANT SKIN OF ABDOMEN: Primary | ICD-10-CM

## 2023-02-23 DIAGNOSIS — K91.2 POSTSURGICAL MALABSORPTION: ICD-10-CM

## 2023-02-23 DIAGNOSIS — N92.0 MENORRHAGIA WITH REGULAR CYCLE: ICD-10-CM

## 2023-02-23 RX ORDER — CYANOCOBALAMIN 1000 UG/ML
1000 INJECTION, SOLUTION INTRAMUSCULAR; SUBCUTANEOUS ONCE
Status: COMPLETED | OUTPATIENT
Start: 2023-02-23 | End: 2023-02-23

## 2023-02-23 RX ORDER — SODIUM CHLORIDE 9 MG/ML
20 INJECTION, SOLUTION INTRAVENOUS ONCE
Status: COMPLETED | OUTPATIENT
Start: 2023-02-23 | End: 2023-02-23

## 2023-02-23 RX ORDER — SODIUM CHLORIDE 9 MG/ML
20 INJECTION, SOLUTION INTRAVENOUS ONCE
OUTPATIENT
Start: 2023-03-23

## 2023-02-23 RX ORDER — CYANOCOBALAMIN 1000 UG/ML
1000 INJECTION, SOLUTION INTRAMUSCULAR; SUBCUTANEOUS ONCE
OUTPATIENT
Start: 2023-03-23

## 2023-02-23 RX ADMIN — IRON SUCROSE 200 MG: 20 INJECTION, SOLUTION INTRAVENOUS at 08:06

## 2023-02-23 RX ADMIN — CYANOCOBALAMIN 1000 MCG: 1000 INJECTION, SOLUTION INTRAMUSCULAR; SUBCUTANEOUS at 08:07

## 2023-02-23 RX ADMIN — SODIUM CHLORIDE 20 ML/HR: 9 INJECTION, SOLUTION INTRAVENOUS at 08:06

## 2023-02-23 NOTE — PATIENT INSTRUCTIONS
I will document a note of her recurrent fungal dermatitis in the area of her abdomen with overlapping skin after her gastric bypass and significant weight loss  The patient is seeking a referral to a plastic surgeon to remove the overlapping skin

## 2023-02-23 NOTE — PROGRESS NOTES
Name: Duey Babinski      : 1985      MRN: [id number]  Encounter Provider: Jd Chavez DO  Encounter Date: 2023   Encounter department: [address] [place] [address]    Assessment & Plan   Redundant skin of abdomen  Status post gastric bypass  Referral to plastic surgeon  Recurrent fungal dermatitis of the abdomen       Subjective      This patient is a 25-year-old female with past history of gastric bypass  She has lost a significant amount of weight and with the weight loss is developed some over lapping abdominal skin which is causing some fungal dermatitis  The fungal dermatitis is reoccurring because of the overlying skin  It does resolve with use of antifungal creams  The patient is requesting a referral to a surgeon to reduce or remove the overlying skin which is leading to the fungal dermatitis  Review of Systems   HENT: Negative  Eyes: Negative  Respiratory: Negative  Cardiovascular: Negative  Gastrointestinal: Negative  Endocrine: Negative  Musculoskeletal: Negative  Skin: Positive for rash  Fungal dermatitis in the abdominal and umbilical area   Allergic/Immunologic: Negative  Neurological: Negative  Hematological: Negative  Psychiatric/Behavioral: Negative  All other systems reviewed and are negative        Current Outpatient Medications on File Prior to Visit   Medication Sig   • CALCIUM CITRATE PO Take by mouth 3 (three) times a day   • CALCIUM PO Take by mouth   • citalopram (CeleXA) 20 mg tablet Take 1 tablet (20 mg total) by mouth daily   • ergocalciferol (VITAMIN D2) 50,000 units Take 1 capsule (50,000 Units total) by mouth 2 (two) times a week with meals   • fexofenadine (ALLEGRA) 180 MG tablet Take 1 tablet (180 mg total) by mouth daily   • furosemide (LASIX) 20 mg tablet One tab daily (Patient not taking: Reported on 2023)   • Multiple Vitamin (multivitamin) tablet Take 1 tablet by mouth daily Bariatric one a day   • nystatin (MYCOSTATIN) cream Apply topically 2 (two) times a day   • phentermine (ADIPEX-P) 37 5 MG tablet Take 1 tablet (37 5 mg total) by mouth every morning   • triamcinolone (KENALOG) 0 1 % cream Apply topically 2 (two) times a day   • [DISCONTINUED] amoxicillin (AMOXIL) 500 mg capsule        Objective     /81   Pulse 59   Temp 97 9 °F (36 6 °C)   Resp 18   Wt 78 kg (172 lb)   SpO2 100%   BMI 27 76 kg/m²     Physical Exam  Constitutional:       Comments: The patient is overweight with a BMI 27 76   Skin:     Comments: Mild fungal dermatitis in the abdominal region with overlapping skin   Neurological:      Mental Status: She is alert  Psychiatric:         Mood and Affect: Mood normal          Behavior: Behavior normal          Thought Content:  Thought content normal          Judgment: Judgment normal        Mayra Somers, DO

## 2023-03-07 ENCOUNTER — TELEPHONE (OUTPATIENT)
Dept: PLASTIC SURGERY | Facility: CLINIC | Age: 38
End: 2023-03-07

## 2023-03-07 NOTE — TELEPHONE ENCOUNTER
Lm for patient to call to review criteria for panniculectomy consultation  Has one note from 2/23/2023

## 2023-03-09 ENCOUNTER — OFFICE VISIT (OUTPATIENT)
Dept: FAMILY MEDICINE CLINIC | Facility: CLINIC | Age: 38
End: 2023-03-09

## 2023-03-09 VITALS
HEART RATE: 70 BPM | TEMPERATURE: 97.9 F | HEIGHT: 66 IN | OXYGEN SATURATION: 100 % | WEIGHT: 170.13 LBS | SYSTOLIC BLOOD PRESSURE: 118 MMHG | RESPIRATION RATE: 21 BRPM | DIASTOLIC BLOOD PRESSURE: 78 MMHG | BODY MASS INDEX: 27.34 KG/M2

## 2023-03-09 DIAGNOSIS — F41.9 ANXIETY: Primary | ICD-10-CM

## 2023-03-09 DIAGNOSIS — M25.551 ACUTE RIGHT HIP PAIN: ICD-10-CM

## 2023-03-09 RX ORDER — CITALOPRAM 20 MG/1
20 TABLET ORAL DAILY
Qty: 90 TABLET | Refills: 3 | Status: SHIPPED | OUTPATIENT
Start: 2023-03-09

## 2023-03-09 NOTE — PATIENT INSTRUCTIONS
The patient will continue her SSRI and to treat her anxiety/depression  The patient will follow-up with her chiropractor to treat her right hip pain  The patient is doing well with the SSRI and will follow-up in 3 months for the anxiety and depression

## 2023-03-09 NOTE — PROGRESS NOTES
Name: Kemi Ba      : 1985      MRN: [id number]  Encounter Provider: Saranya Arellano DO  Encounter Date: 3/9/2023   Encounter department: [facility]     1  Acute right hip pain    2  Anxiety  -     citalopram (CeleXA) 20 mg tablet; Take 1 tablet (20 mg total) by mouth daily           Subjective      This is a follow-up appointment for this patient's history of anxiety and depression  The patient has been taking an SSRI and is worked well for this problem  The patient does have some right hip pain with no history of any trauma  She does have an appointment scheduled with a chiropractor  The patient is not a candidate for NSAIDs because of her history of gastric bypass  The patient is seeking a plastic surgery consult for overlapping skin after her gastric bypass  She also had a complication of tinea infections under the overlapping skin  She denies any new problems today  Review of Systems   HENT: Negative  Eyes: Negative  Respiratory: Negative  Cardiovascular: Negative  Gastrointestinal: Negative  Endocrine: Negative  Musculoskeletal: Negative  Allergic/Immunologic: Negative  Neurological: Negative  Hematological: Negative  Psychiatric/Behavioral: Negative  All other systems reviewed and are negative        Current Outpatient Medications on File Prior to Visit   Medication Sig   • CALCIUM CITRATE PO Take by mouth 3 (three) times a day   • CALCIUM PO Take by mouth   • ergocalciferol (VITAMIN D2) 50,000 units Take 1 capsule (50,000 Units total) by mouth 2 (two) times a week with meals   • fexofenadine (ALLEGRA) 180 MG tablet Take 1 tablet (180 mg total) by mouth daily   • Multiple Vitamin (multivitamin) tablet Take 1 tablet by mouth daily Bariatric one a day   • nystatin (MYCOSTATIN) cream Apply topically 2 (two) times a day   • phentermine (ADIPEX-P) 37 5 MG tablet Take 1 tablet (37 5 mg total) by mouth every morning   • triamcinolone (KENALOG) 0 1 % cream Apply topically 2 (two) times a day   • [DISCONTINUED] citalopram (CeleXA) 20 mg tablet Take 1 tablet (20 mg total) by mouth daily   • furosemide (LASIX) 20 mg tablet One tab daily (Patient not taking: Reported on 2/2/2023)       Objective     /78 (BP Location: Left arm, Patient Position: Sitting, Cuff Size: Large)   Pulse 70   Temp 97 9 °F (36 6 °C) (Temporal)   Resp 21   Ht 5' 6" (1 676 m)   Wt 77 2 kg (170 lb 2 oz)   LMP 03/08/2023 (Exact Date)   SpO2 100%   BMI 27 46 kg/m²     Physical Exam  Constitutional:       Comments: The patient is overweight with a BMI of 27 46   Cardiovascular:      Rate and Rhythm: Normal rate and regular rhythm  Heart sounds: Normal heart sounds  Pulmonary:      Effort: Pulmonary effort is normal       Breath sounds: Normal breath sounds  Musculoskeletal:         General: Normal range of motion  Neurological:      General: No focal deficit present  Mental Status: She is alert and oriented to person, place, and time  Mental status is at baseline  Psychiatric:         Mood and Affect: Mood normal          Behavior: Behavior normal          Thought Content:  Thought content normal          Judgment: Judgment normal        Sourav Hargrove, DO

## 2023-03-23 ENCOUNTER — HOSPITAL ENCOUNTER (OUTPATIENT)
Dept: INFUSION CENTER | Facility: HOSPITAL | Age: 38
Discharge: HOME | End: 2023-03-23
Attending: INTERNAL MEDICINE

## 2023-03-23 VITALS
SYSTOLIC BLOOD PRESSURE: 102 MMHG | HEART RATE: 58 BPM | RESPIRATION RATE: 18 BRPM | OXYGEN SATURATION: 98 % | DIASTOLIC BLOOD PRESSURE: 61 MMHG | TEMPERATURE: 99.1 F

## 2023-03-23 DIAGNOSIS — N92.0 MENORRHAGIA WITH REGULAR CYCLE: ICD-10-CM

## 2023-03-23 DIAGNOSIS — K91.2 POSTSURGICAL MALABSORPTION: ICD-10-CM

## 2023-03-23 DIAGNOSIS — E61.1 IRON DEFICIENCY: ICD-10-CM

## 2023-03-23 DIAGNOSIS — E53.8 B12 DEFICIENCY: Primary | ICD-10-CM

## 2023-03-23 DIAGNOSIS — Z48.815 ENCOUNTER FOR SURGICAL AFTERCARE FOLLOWING SURGERY OF DIGESTIVE SYSTEM: ICD-10-CM

## 2023-03-23 RX ORDER — CYANOCOBALAMIN 1000 UG/ML
1000 INJECTION, SOLUTION INTRAMUSCULAR; SUBCUTANEOUS ONCE
Status: COMPLETED | OUTPATIENT
Start: 2023-03-23 | End: 2023-03-23

## 2023-03-23 RX ORDER — SODIUM CHLORIDE 9 MG/ML
20 INJECTION, SOLUTION INTRAVENOUS ONCE
Status: COMPLETED | OUTPATIENT
Start: 2023-03-23 | End: 2023-03-23

## 2023-03-23 RX ORDER — CYANOCOBALAMIN 1000 UG/ML
1000 INJECTION, SOLUTION INTRAMUSCULAR; SUBCUTANEOUS ONCE
OUTPATIENT
Start: 2023-04-20

## 2023-03-23 RX ORDER — SODIUM CHLORIDE 9 MG/ML
20 INJECTION, SOLUTION INTRAVENOUS ONCE
OUTPATIENT
Start: 2023-04-20

## 2023-03-23 RX ADMIN — IRON SUCROSE 200 MG: 20 INJECTION, SOLUTION INTRAVENOUS at 07:50

## 2023-03-23 RX ADMIN — SODIUM CHLORIDE 20 ML/HR: 0.9 INJECTION, SOLUTION INTRAVENOUS at 07:49

## 2023-03-23 RX ADMIN — CYANOCOBALAMIN 1000 MCG: 1000 INJECTION, SOLUTION INTRAMUSCULAR; SUBCUTANEOUS at 08:48

## 2023-04-03 ENCOUNTER — OFFICE VISIT (OUTPATIENT)
Dept: BARIATRICS | Facility: CLINIC | Age: 38
End: 2023-04-03

## 2023-04-03 VITALS
SYSTOLIC BLOOD PRESSURE: 112 MMHG | DIASTOLIC BLOOD PRESSURE: 74 MMHG | WEIGHT: 182.6 LBS | BODY MASS INDEX: 29.35 KG/M2 | HEART RATE: 67 BPM | HEIGHT: 66 IN

## 2023-04-03 DIAGNOSIS — E28.2 PCOS (POLYCYSTIC OVARIAN SYNDROME): ICD-10-CM

## 2023-04-03 DIAGNOSIS — K91.2 POSTSURGICAL MALABSORPTION: ICD-10-CM

## 2023-04-03 DIAGNOSIS — E61.1 IRON DEFICIENCY: ICD-10-CM

## 2023-04-03 DIAGNOSIS — E66.9 OBESITY, CLASS I, BMI 30-34.9: ICD-10-CM

## 2023-04-03 DIAGNOSIS — E66.3 OVERWEIGHT (BMI 25.0-29.9): Primary | ICD-10-CM

## 2023-04-03 NOTE — ASSESSMENT & PLAN NOTE
- Patient is pursuing Conservative Program  - Initial weight loss goal of 5-10% weight loss for improved health  - Labs reviewed from 5/2022 and 9/2022 - will be getting labs with PCP next month    Wegovy Instructions:    -Begin Wegovy 0 25 mg subcutaneously once a week  Will increase the dose each month until we get to goal dose  -Please message me a few days after you take the first pen so that I can submit for the next month's prescription    - Visit [employer] for further information/injection instructions    -Please eat small frequent meals to help reduce nausea  Lemon water and saltine crackers may help with this  - Side effects of Wegovy discussed: nausea, vomiting, diarrhea, and constipation  If you experience fever, nausea/vomiting, and pain radiating to your back this may be a sign of pancreatitis  Please go to the emergency room if this occurs  - Patient understands the side effects of the medication and proper administration  Patient agrees with the treatment plan and all questions were answered  Goals:  Do not skip meals  Discussed increasing fiber in diet to avoid constipation  Begin to food log via kayode - options include  www  myfitnesspal com, sparkpeople  com, loseit com, calorieking  com, [facility]  No sugary beverages  Continue to drink at least 64oz of water daily  Increase physical activity by 10 minutes daily   Gradually increase physical activity to a goal of 5 days per week for 30 minutes of MODERATE intensity PLUS 2 days per week of FULL BODY resistance training

## 2023-04-03 NOTE — PROGRESS NOTES
Assessment/Plan:     Overweight (BMI 25 0-29 9)  - Patient is pursuing Conservative Program  - Initial weight loss goal of 5-10% weight loss for improved health  - Labs reviewed from 5/2022 and 9/2022 - will be getting labs with PCP next month    Wegovy Instructions:    -Begin Wegovy 0 25 mg subcutaneously once a week  Will increase the dose each month until we get to goal dose  -Please message me a few days after you take the first pen so that I can submit for the next month's prescription    - Visit [employer] for further information/injection instructions    -Please eat small frequent meals to help reduce nausea  Lemon water and saltine crackers may help with this  - Side effects of Wegovy discussed: nausea, vomiting, diarrhea, and constipation  If you experience fever, nausea/vomiting, and pain radiating to your back this may be a sign of pancreatitis  Please go to the emergency room if this occurs  - Patient understands the side effects of the medication and proper administration  Patient agrees with the treatment plan and all questions were answered  Goals:  Do not skip meals  Discussed increasing fiber in diet to avoid constipation  Begin to food log via kayode - options include  www  myfitnesspal com, sparkpeople  com, loseit com, calorieking  com, [employer]  No sugary beverages  Continue to drink at least 64oz of water daily  Increase physical activity by 10 minutes daily  Gradually increase physical activity to a goal of 5 days per week for 30 minutes of MODERATE intensity PLUS 2 days per week of FULL BODY resistance training    PCOS (polycystic ovarian syndrome)  Placed on norethindrone to help with heavy and painful periods  History of tubal ligation         Marisol was seen today for follow-up  Diagnoses and all orders for this visit:    Overweight (BMI 25 0-29  9)    PCOS (polycystic ovarian syndrome)  -     Semaglutide-Weight Management (WEGOVY) 0 25 MG/0 5ML;  Inject 0 5 mL (0 25 mg total) under the skin once a week    Postsurgical malabsorption    Obesity, Class I, BMI 30-34 9  -     Semaglutide-Weight Management (WEGOVY) 0 25 MG/0 5ML; Inject 0 5 mL (0 25 mg total) under the skin once a week    Iron deficiency         Patient denies personal history of pancreatitis  Patient also denies personal and family history of medullary thyroid cancer and multiple endocrine neoplasia type 2 (MEN 2 tumor)  Patient denies any history of kidney stones, seizures, or glaucoma  Total time spent reviewing chart, interviewing patient, examining patient, discussing plan, answering all questions, and documentin minutes with >50% face-to-face time with the patient  Follow up in approximately 6 weeks with Non-Surgical Physician/Advanced Practitioner  Subjective:   Chief Complaint   Patient presents with   • Follow-up     Pt is here today for MWM f/u  Patient ID: Elle Murillo  is a 42 y o  female with excess weight/obesity here to pursue weight management  Patient is pursuing Conservative Program    Most recent notes and records were reviewed  HPI    Wt Readings from Last 20 Encounters:   23 82 8 kg (182 lb 9 6 oz)   23 77 2 kg (170 lb 2 oz)   23 78 kg (172 lb)   23 77 6 kg (171 lb)   23 76 6 kg (168 lb 14 4 oz)   22 79 kg (174 lb 1 6 oz)   22 81 1 kg (178 lb 12 8 oz)   22 79 3 kg (174 lb 12 8 oz)   10/11/22 86 4 kg (190 lb 6 4 oz)   22 85 7 kg (189 lb)   22 82 8 kg (182 lb 9 6 oz)   22 87 5 kg (193 lb)   22 88 3 kg (194 lb 9 6 oz)   22 87 6 kg (193 lb 3 2 oz)   22 87 5 kg (193 lb)   22 84 8 kg (187 lb)   22 84 4 kg (186 lb)   22 83 9 kg (185 lb)   21 79 9 kg (176 lb 3 2 oz)   10/28/21 77 6 kg (171 lb)       Patient presents today to medical weight management office for follow up  Patient has history of analilia-en-y bypass surgery in  having post-op weight gain   Was tried on topiramate "but weight ended up at a plateau  She was switched to phentermine and was doing well but anxiety started to get uncontrolled, she stopped on her own last month  Patient states she is an \"emotional eater\" and \"gets a lot of cravings\"  Insurance has changed and states she has coverage for GLP-1 medications  Initial weight: 194 6lbs  Current weight: 182 6lbs (+11 6 lbs since last MWM OV)  Change in weight: -12 lbs      B- Protein shake  L- PB&J  D- chicken occasionally with starch    Hydration- 96 oz daily  Alcohol- very rare  Exercise- hasn't been due to work      The following portions of the patient's history were reviewed and updated as appropriate: allergies, current medications, past family history, past medical history, past social history, past surgical history, and problem list     Family History   Problem Relation Age of Onset   • Diabetes Mother    • Heart disease Mother         CHF   • Hypertension Mother    • Kidney cancer Mother         5/2020   • Cancer Father         kidney   • Aneurysm Father         AAA   • COPD Father    • Kidney disease Father    • Kidney cancer Father         2010   • No Known Problems Sister    • Cancer Maternal Grandmother         bladder   • Cancer Paternal Grandfather         brain   • Stroke Paternal Grandfather    • No Known Problems Sister    • No Known Problems Sister         Review of Systems   Constitutional: Negative for fatigue  HENT: Negative for sore throat  Respiratory: Negative for cough and shortness of breath  Cardiovascular: Negative for chest pain, palpitations and leg swelling  Gastrointestinal: Positive for constipation  Negative for abdominal pain, diarrhea and nausea  Genitourinary: Negative for dysuria  Musculoskeletal: Negative for arthralgias and back pain  Skin: Negative for rash  Neurological: Negative for headaches  Psychiatric/Behavioral: Negative for dysphoric mood  The patient is not nervous/anxious          Objective:  BP " "112/74 (BP Location: Left arm, Patient Position: Sitting, Cuff Size: Standard)   Pulse 67   Ht 5' 6\" (1 676 m)   Wt 82 8 kg (182 lb 9 6 oz)   LMP 03/08/2023 (Exact Date)   BMI 29 47 kg/m²     Physical Exam  Vitals and nursing note reviewed  Constitutional:       Appearance: Normal appearance  She is obese  HENT:      Head: Normocephalic  Neck:      Thyroid: No thyroid mass, thyromegaly or thyroid tenderness  Cardiovascular:      Rate and Rhythm: Normal rate and regular rhythm  Pulses: Normal pulses  Heart sounds: Normal heart sounds  Pulmonary:      Effort: Pulmonary effort is normal       Breath sounds: Normal breath sounds  Abdominal:      General: Bowel sounds are normal       Palpations: Abdomen is soft  Musculoskeletal:      Cervical back: Normal range of motion and neck supple  No tenderness  Right lower leg: No edema  Left lower leg: No edema  Skin:     General: Skin is warm and dry  Neurological:      General: No focal deficit present  Mental Status: She is alert and oriented to person, place, and time  Psychiatric:         Mood and Affect: Mood normal          Behavior: Behavior normal          Thought Content:  Thought content normal          Judgment: Judgment normal             Labs   Most recent labs reviewed   Lab Results   Component Value Date    SODIUM 140 05/04/2022    K 4 1 05/04/2022     05/04/2022    CO2 24 05/04/2022    AGAP 7 07/21/2020    BUN 11 05/04/2022    CREATININE 0 75 05/04/2022    GLUC 85 05/04/2022    CALCIUM 8 6 07/21/2020    AST 25 05/04/2022    ALT 38 (H) 05/04/2022    TP 6 7 05/04/2022    TBILI 0 4 05/04/2022    EGFR 104 05/04/2022     Lab Results   Component Value Date    HGBA1C 5 1 05/04/2022     Lab Results   Component Value Date    ADR6KIFOQWGS 2 400 08/09/2017    TSH 2 330 12/02/2021     Lab Results   Component Value Date    CHOLESTEROL 157 05/04/2022     Lab Results   Component Value Date    HDL 65 05/04/2022     Lab " Results   Component Value Date    TRIG 82 05/04/2022     Lab Results   Component Value Date    LDLCALC 77 05/04/2022

## 2023-04-20 ENCOUNTER — HOSPITAL ENCOUNTER (OUTPATIENT)
Dept: INFUSION CENTER | Facility: HOSPITAL | Age: 38
Discharge: HOME | End: 2023-04-20
Attending: INTERNAL MEDICINE

## 2023-04-20 VITALS
RESPIRATION RATE: 18 BRPM | HEART RATE: 63 BPM | TEMPERATURE: 98.2 F | DIASTOLIC BLOOD PRESSURE: 64 MMHG | SYSTOLIC BLOOD PRESSURE: 120 MMHG | OXYGEN SATURATION: 100 %

## 2023-04-20 DIAGNOSIS — N92.0 MENORRHAGIA WITH REGULAR CYCLE: ICD-10-CM

## 2023-04-20 DIAGNOSIS — Z48.815 ENCOUNTER FOR SURGICAL AFTERCARE FOLLOWING SURGERY OF DIGESTIVE SYSTEM: ICD-10-CM

## 2023-04-20 DIAGNOSIS — E61.1 IRON DEFICIENCY: ICD-10-CM

## 2023-04-20 DIAGNOSIS — E53.8 B12 DEFICIENCY: Primary | ICD-10-CM

## 2023-04-20 DIAGNOSIS — K91.2 POSTSURGICAL MALABSORPTION: ICD-10-CM

## 2023-04-20 RX ORDER — SODIUM CHLORIDE 9 MG/ML
20 INJECTION, SOLUTION INTRAVENOUS ONCE
OUTPATIENT
Start: 2023-05-18

## 2023-04-20 RX ORDER — CYANOCOBALAMIN 1000 UG/ML
1000 INJECTION, SOLUTION INTRAMUSCULAR; SUBCUTANEOUS ONCE
OUTPATIENT
Start: 2023-05-18

## 2023-04-20 RX ORDER — CYANOCOBALAMIN 1000 UG/ML
1000 INJECTION, SOLUTION INTRAMUSCULAR; SUBCUTANEOUS ONCE
Status: COMPLETED | OUTPATIENT
Start: 2023-04-20 | End: 2023-04-20

## 2023-04-20 RX ORDER — SODIUM CHLORIDE 9 MG/ML
20 INJECTION, SOLUTION INTRAVENOUS ONCE
Status: COMPLETED | OUTPATIENT
Start: 2023-04-20 | End: 2023-04-20

## 2023-04-20 RX ADMIN — SODIUM CHLORIDE 200 MG: 900 INJECTION, SOLUTION INTRAVENOUS at 08:52

## 2023-04-20 RX ADMIN — CYANOCOBALAMIN 1000 MCG: 1000 INJECTION, SOLUTION INTRAMUSCULAR; SUBCUTANEOUS at 08:55

## 2023-04-20 RX ADMIN — SODIUM CHLORIDE 20 ML/HR: 0.9 INJECTION, SOLUTION INTRAVENOUS at 08:52

## 2023-05-01 ENCOUNTER — TELEPHONE (OUTPATIENT)
Dept: FAMILY MEDICINE CLINIC | Facility: CLINIC | Age: 38
End: 2023-05-01

## 2023-05-01 DIAGNOSIS — E28.2 PCOS (POLYCYSTIC OVARIAN SYNDROME): ICD-10-CM

## 2023-05-01 DIAGNOSIS — E66.9 OBESITY, CLASS I, BMI 30-34.9: ICD-10-CM

## 2023-05-01 NOTE — TELEPHONE ENCOUNTER
Pt called for update on Wegovy rx  She said she last spoke with Dr Josh Peña and they agreed upon sending there [facility]JUSTO SKY thru PCP rather than Weight Management since specialists is a $60 copay  Informed her Dr Josh Peña is on rounds this week  Explained it may take some time until he is able to send  Confirmed 0 5ml of wegovy

## 2023-05-18 ENCOUNTER — HOSPITAL ENCOUNTER (OUTPATIENT)
Dept: INFUSION CENTER | Facility: HOSPITAL | Age: 38
Discharge: HOME | End: 2023-05-18
Attending: INTERNAL MEDICINE

## 2023-05-18 VITALS
TEMPERATURE: 98.7 F | OXYGEN SATURATION: 99 % | DIASTOLIC BLOOD PRESSURE: 77 MMHG | HEART RATE: 59 BPM | RESPIRATION RATE: 20 BRPM | SYSTOLIC BLOOD PRESSURE: 118 MMHG

## 2023-05-18 DIAGNOSIS — Z48.815 ENCOUNTER FOR SURGICAL AFTERCARE FOLLOWING SURGERY OF DIGESTIVE SYSTEM: ICD-10-CM

## 2023-05-18 DIAGNOSIS — K91.2 POSTSURGICAL MALABSORPTION: ICD-10-CM

## 2023-05-18 DIAGNOSIS — E61.1 IRON DEFICIENCY: ICD-10-CM

## 2023-05-18 DIAGNOSIS — E53.8 B12 DEFICIENCY: Primary | ICD-10-CM

## 2023-05-18 DIAGNOSIS — N92.0 MENORRHAGIA WITH REGULAR CYCLE: ICD-10-CM

## 2023-05-18 RX ORDER — SODIUM CHLORIDE 9 MG/ML
20 INJECTION, SOLUTION INTRAVENOUS ONCE
Status: COMPLETED | OUTPATIENT
Start: 2023-05-18 | End: 2023-05-18

## 2023-05-18 RX ORDER — SODIUM CHLORIDE 9 MG/ML
20 INJECTION, SOLUTION INTRAVENOUS ONCE
Status: CANCELLED | OUTPATIENT
Start: 2023-05-18

## 2023-05-18 RX ORDER — CYANOCOBALAMIN 1000 UG/ML
1000 INJECTION, SOLUTION INTRAMUSCULAR; SUBCUTANEOUS ONCE
OUTPATIENT
Start: 2023-06-15

## 2023-05-18 RX ORDER — CYANOCOBALAMIN 1000 UG/ML
1000 INJECTION, SOLUTION INTRAMUSCULAR; SUBCUTANEOUS ONCE
Status: COMPLETED | OUTPATIENT
Start: 2023-05-18 | End: 2023-05-18

## 2023-05-18 RX ADMIN — IRON SUCROSE 200 MG: 20 INJECTION, SOLUTION INTRAVENOUS at 08:34

## 2023-05-18 RX ADMIN — SODIUM CHLORIDE 20 ML/HR: 9 INJECTION, SOLUTION INTRAVENOUS at 08:34

## 2023-05-18 RX ADMIN — CYANOCOBALAMIN 1000 MCG: 1000 INJECTION, SOLUTION INTRAMUSCULAR; SUBCUTANEOUS at 08:35

## 2023-05-22 DIAGNOSIS — E28.2 PCOS (POLYCYSTIC OVARIAN SYNDROME): ICD-10-CM

## 2023-05-22 DIAGNOSIS — E66.9 OBESITY, CLASS I, BMI 30-34.9: ICD-10-CM

## 2023-05-22 NOTE — TELEPHONE ENCOUNTER
Patient requesting next dose up 0 5 sent to [address]  Patient is completely out  Her next dose is Monday

## 2023-05-24 NOTE — TELEPHONE ENCOUNTER
"Can we correct this prescription or contact her to inform? \"Hi, this is Lexii Engel for one more pharmacy calling regarding a mutual patient of ours, last name Kirby Roe on first name Tami Avendaño's YOB: 1985  Looks like you guys sent over  We'll go view   25 when the patient wants  She just called saying that she should have gotten the  5  Give us a call back at [phone], again [phone]  Thank you  \"    "

## 2023-05-26 ENCOUNTER — TELEPHONE (OUTPATIENT)
Dept: HEMATOLOGY ONCOLOGY | Facility: CLINIC | Age: 38
End: 2023-05-26

## 2023-05-26 DIAGNOSIS — E61.1 IRON DEFICIENCY: ICD-10-CM

## 2023-05-26 DIAGNOSIS — K91.2 POSTSURGICAL MALABSORPTION: ICD-10-CM

## 2023-05-26 DIAGNOSIS — E53.8 B12 DEFICIENCY: ICD-10-CM

## 2023-05-26 DIAGNOSIS — Z86.39 HX OF IRON DEFICIENCY: ICD-10-CM

## 2023-05-26 DIAGNOSIS — N92.0 MENORRHAGIA WITH REGULAR CYCLE: Primary | ICD-10-CM

## 2023-05-30 ENCOUNTER — TELEPHONE (OUTPATIENT)
Dept: HEMATOLOGY ONCOLOGY | Facility: CLINIC | Age: 38
End: 2023-05-30

## 2023-05-31 ENCOUNTER — TELEPHONE (OUTPATIENT)
Dept: HEMATOLOGY ONCOLOGY | Facility: CLINIC | Age: 38
End: 2023-05-31

## 2023-05-31 ENCOUNTER — TELEPHONE (OUTPATIENT)
Dept: FAMILY MEDICINE CLINIC | Facility: CLINIC | Age: 38
End: 2023-05-31

## 2023-05-31 NOTE — TELEPHONE ENCOUNTER
"Submitted ins referral via [facility]  Faxed to [phone]       Placed in \"to be scanned\" to be scanned to chart   "

## 2023-05-31 NOTE — TELEPHONE ENCOUNTER
Insurance referral for office visit with Lokesh Lord, Physician Assistant  DX [id number]   [facility]  Hematology  [phone]  Fax [phone]    Appt 6/1 at 1130am Yes

## 2023-06-01 ENCOUNTER — APPOINTMENT (OUTPATIENT)
Dept: LAB | Facility: HOSPITAL | Age: 38
End: 2023-06-01
Payer: COMMERCIAL

## 2023-06-01 ENCOUNTER — OFFICE VISIT (OUTPATIENT)
Dept: FAMILY MEDICINE CLINIC | Facility: CLINIC | Age: 38
End: 2023-06-01

## 2023-06-01 ENCOUNTER — TELEPHONE (OUTPATIENT)
Dept: HEMATOLOGY ONCOLOGY | Facility: CLINIC | Age: 38
End: 2023-06-01

## 2023-06-01 VITALS
SYSTOLIC BLOOD PRESSURE: 124 MMHG | TEMPERATURE: 97.9 F | BODY MASS INDEX: 28.93 KG/M2 | HEIGHT: 66 IN | WEIGHT: 180 LBS | DIASTOLIC BLOOD PRESSURE: 80 MMHG | OXYGEN SATURATION: 95 % | HEART RATE: 61 BPM | RESPIRATION RATE: 16 BRPM

## 2023-06-01 DIAGNOSIS — D50.8 IRON DEFICIENCY ANEMIA SECONDARY TO INADEQUATE DIETARY IRON INTAKE: ICD-10-CM

## 2023-06-01 DIAGNOSIS — F41.9 ANXIETY AND DEPRESSION: ICD-10-CM

## 2023-06-01 DIAGNOSIS — D50.0 IRON DEFICIENCY ANEMIA DUE TO CHRONIC BLOOD LOSS: ICD-10-CM

## 2023-06-01 DIAGNOSIS — E55.9 AVITAMINOSIS D: Primary | ICD-10-CM

## 2023-06-01 DIAGNOSIS — F41.9 ANXIETY: ICD-10-CM

## 2023-06-01 DIAGNOSIS — E61.1 IRON DEFICIENCY: ICD-10-CM

## 2023-06-01 DIAGNOSIS — N92.0 MENORRHAGIA WITH REGULAR CYCLE: ICD-10-CM

## 2023-06-01 DIAGNOSIS — E53.8 BIOTIN-(PROPIONYL-COA-CARBOXYLASE) LIGASE DEFICIENCY: ICD-10-CM

## 2023-06-01 DIAGNOSIS — Z12.31 ENCOUNTER FOR SCREENING MAMMOGRAM FOR MALIGNANT NEOPLASM OF BREAST: Primary | ICD-10-CM

## 2023-06-01 DIAGNOSIS — F32.A ANXIETY AND DEPRESSION: ICD-10-CM

## 2023-06-01 DIAGNOSIS — Z11.59 SCREENING EXAMINATION FOR POLIOMYELITIS: ICD-10-CM

## 2023-06-01 DIAGNOSIS — K91.2 HYPOGLYCEMIA FOLLOWING GASTROINTESTINAL SURGERY: ICD-10-CM

## 2023-06-01 DIAGNOSIS — Z86.39 PERSONAL HISTORY OF NUTRITIONAL DEFICIENCY: ICD-10-CM

## 2023-06-01 DIAGNOSIS — Z00.00 ANNUAL PHYSICAL EXAM: ICD-10-CM

## 2023-06-01 DIAGNOSIS — K91.2 POSTSURGICAL MALABSORPTION: ICD-10-CM

## 2023-06-01 DIAGNOSIS — N92.0 EXCESSIVE OR FREQUENT MENSTRUATION: ICD-10-CM

## 2023-06-01 DIAGNOSIS — Z11.4 SCREENING FOR HUMAN IMMUNODEFICIENCY VIRUS: ICD-10-CM

## 2023-06-01 LAB
25(OH)D3 SERPL-MCNC: 28.1 NG/ML (ref 30–100)
BASOPHILS # BLD AUTO: 0.03 THOUSANDS/ÂΜL (ref 0–0.1)
BASOPHILS NFR BLD AUTO: 1 % (ref 0–1)
EOSINOPHIL # BLD AUTO: 0.13 THOUSAND/ÂΜL (ref 0–0.61)
EOSINOPHIL NFR BLD AUTO: 3 % (ref 0–6)
ERYTHROCYTE [DISTWIDTH] IN BLOOD BY AUTOMATED COUNT: 18.4 % (ref 11.6–15.1)
FERRITIN SERPL-MCNC: 18 NG/ML (ref 11–307)
HCT VFR BLD AUTO: 40.3 % (ref 34.8–46.1)
HCV AB SER QL: NORMAL
HGB BLD-MCNC: 12.5 G/DL (ref 11.5–15.4)
HIV 1+2 AB+HIV1 P24 AG SERPL QL IA: NORMAL
HIV 2 AB SERPL QL IA: NORMAL
HIV1 AB SERPL QL IA: NORMAL
HIV1 P24 AG SERPL QL IA: NORMAL
IMM GRANULOCYTES # BLD AUTO: 0.01 THOUSAND/UL (ref 0–0.2)
IMM GRANULOCYTES NFR BLD AUTO: 0 % (ref 0–2)
IRON SATN MFR SERPL: 15 % (ref 15–50)
IRON SERPL-MCNC: 52 UG/DL (ref 50–170)
LYMPHOCYTES # BLD AUTO: 1.43 THOUSANDS/ÂΜL (ref 0.6–4.47)
LYMPHOCYTES NFR BLD AUTO: 31 % (ref 14–44)
MCH RBC QN AUTO: 26 PG (ref 26.8–34.3)
MCHC RBC AUTO-ENTMCNC: 31 G/DL (ref 31.4–37.4)
MCV RBC AUTO: 84 FL (ref 82–98)
MONOCYTES # BLD AUTO: 0.32 THOUSAND/ÂΜL (ref 0.17–1.22)
MONOCYTES NFR BLD AUTO: 7 % (ref 4–12)
NEUTROPHILS # BLD AUTO: 2.74 THOUSANDS/ÂΜL (ref 1.85–7.62)
NEUTS SEG NFR BLD AUTO: 58 % (ref 43–75)
NRBC BLD AUTO-RTO: 0 /100 WBCS
PLATELET # BLD AUTO: 233 THOUSANDS/UL (ref 149–390)
PMV BLD AUTO: 12.1 FL (ref 8.9–12.7)
RBC # BLD AUTO: 4.81 MILLION/UL (ref 3.81–5.12)
TIBC SERPL-MCNC: 346 UG/DL (ref 250–450)
VIT B12 SERPL-MCNC: 378 PG/ML (ref 180–914)
WBC # BLD AUTO: 4.66 THOUSAND/UL (ref 4.31–10.16)

## 2023-06-01 PROCEDURE — 86803 HEPATITIS C AB TEST: CPT

## 2023-06-01 PROCEDURE — 82607 VITAMIN B-12: CPT

## 2023-06-01 PROCEDURE — 83540 ASSAY OF IRON: CPT

## 2023-06-01 PROCEDURE — 82306 VITAMIN D 25 HYDROXY: CPT

## 2023-06-01 PROCEDURE — 36415 COLL VENOUS BLD VENIPUNCTURE: CPT

## 2023-06-01 PROCEDURE — 85025 COMPLETE CBC W/AUTO DIFF WBC: CPT

## 2023-06-01 PROCEDURE — 82728 ASSAY OF FERRITIN: CPT

## 2023-06-01 PROCEDURE — 83550 IRON BINDING TEST: CPT

## 2023-06-01 PROCEDURE — 87389 HIV-1 AG W/HIV-1&-2 AB AG IA: CPT

## 2023-06-01 RX ORDER — CITALOPRAM 10 MG/1
10 TABLET ORAL DAILY
Qty: 30 TABLET | Refills: 2 | Status: SHIPPED | OUTPATIENT
Start: 2023-06-01

## 2023-06-01 RX ORDER — BUPROPION HYDROCHLORIDE 100 MG/1
100 TABLET ORAL 2 TIMES DAILY
Qty: 60 TABLET | Refills: 1 | Status: SHIPPED | OUTPATIENT
Start: 2023-06-01

## 2023-06-01 NOTE — PROGRESS NOTES
[facility]    NAME: Marisol Weber  AGE: 34 y o  SEX: female  : 1985     DATE: 2023     Assessment and Plan:     Problem List Items Addressed This Visit    None  Visit Diagnoses     Encounter for screening mammogram for malignant neoplasm of breast    -  Primary    Relevant Orders    Mammo screening bilateral w 3d & cad    Anxiety and depression        Relevant Medications    buPROPion (WELLBUTRIN) 100 mg tablet    citalopram (CeleXA) 10 mg tablet    Anxiety        Relevant Medications    citalopram (CeleXA) 10 mg tablet          Immunizations and preventive care screenings were discussed with patient today  Appropriate education was printed on patient's after visit summary  Counseling:  · Exercise: the importance of regular exercise/physical activity was discussed  Recommend exercise 3-5 times per week for at least 30 minutes  No follow-ups on file  Chief Complaint:     Chief Complaint   Patient presents with   • Follow-up     Anxiety and Depression   • Medication Management     Needs an increase of Wegovy  She has not been increased on Wegovy as supposed be due to calling in for refills and getting same dose  • Screening     Would like order for mammogram      History of Present Illness:     Adult Annual Physical   Patient here for a comprehensive physical exam  The patient reports Persistent anxiety, food cravings, and would like to discuss Wellbutrin as it has been beneficial in her sister for similar symptoms and her father used to to quit smoking       Diet and Physical Activity  · Diet/Nutrition: limited fruits/vegetables and otherwise fair diet  · Exercise: irregular but attempts weights and cardio when possible        Depression Screening  PHQ-2/9 Depression Screening    Little interest or pleasure in doing things: 0 - not at all  Feeling down, depressed, or hopeless: 0 - not at all  Trouble falling or staying asleep, or sleeping too much: 0 - not at all  Feeling tired or having little energy: 3 - nearly every day  Poor appetite or overeatin - several days  Feeling bad about yourself - or that you are a failure or have let yourself or your family down: 1 - several days  Trouble concentrating on things, such as reading the newspaper or watching television: 2 - more than half the days  Moving or speaking so slowly that other people could have noticed  Or the opposite - being so fidgety or restless that you have been moving around a lot more than usual: 1 - several days  Thoughts that you would be better off dead, or of hurting yourself in some way: 0 - not at all  PHQ-2 Score: 0  PHQ-2 Interpretation: Negative depression screen  PHQ-9 Score: 8   PHQ-9 Interpretation: Mild depression        General Health  · Sleep: gets more than 8 hours of sleep on average  · Hearing: normal - bilateral   · Vision: no vision problems  · Dental: regular dental visits  /GYN Health  · Patient is: premenopausal  · Contraceptive method: oral contraceptives  Review of Systems:     Review of Systems   Constitutional: Negative for chills, fatigue and fever  HENT: Negative for congestion, ear pain, hearing loss, rhinorrhea, sore throat and trouble swallowing  Eyes: Negative for pain and visual disturbance  Respiratory: Negative for cough and shortness of breath  Cardiovascular: Negative for chest pain  Gastrointestinal: Negative for constipation, diarrhea, nausea and vomiting  Endocrine: Negative for polyuria  Genitourinary: Negative for difficulty urinating and dysuria  Musculoskeletal: Negative for arthralgias and myalgias  Neurological: Negative for dizziness, light-headedness and headaches  Psychiatric/Behavioral: The patient is nervous/anxious         Past Medical History:     Past Medical History:   Diagnosis Date   • Allergic rhinitis    • Anemia     ok currently   • Anxiety    • Asthma activity induced   • Benign neoplasm of skin of trunk    • Morbid obesity with BMI of 40 0-44 9, adult (HCC)    • Polycystic ovary syndrome    • Postgastrectomy malabsorption    • Skin tag    • Tinea versicolor       Past Surgical History:     Past Surgical History:   Procedure Laterality Date   • CERVICAL CERCLAGE     •  SECTION  2017   • PARAESOPHAGEAL HERNIA REPAIR N/A 2020    Procedure: REPAIR HERNIA PARAESOPHAGEAL  LAPAROSCOPIC;  Surgeon: Sujey Francisco MD;  Location: [address] [place] [address];  Service: Bariatrics   • OR LAPAROSCOPY SURG CHOLECYSTECTOMY N/A 2021    Procedure: CHOLECYSTECTOMY LAPAROSCOPIC;  Surgeon: Saeed Smith MD;  Location: [address] [place] [address];  Service: General   • OR LAPS GSTR RSTCV PX W/BYP JENNIFER-EN-Y LIMB <150 CM N/A 2020    Procedure: BYPASS GASTRIC  JENNIFER-EN-Y LAPAROSCOPIC;  Surgeon: Sujey Francisco MD;  Location: [facility];  Service: Bariatrics   • TONSILLECTOMY     • TUBAL LIGATION        Social History:     Social History     Socioeconomic History   • Marital status:      Spouse name: None   • Number of children: None   • Years of education: None   • Highest education level: None   Occupational History   • None   Tobacco Use   • Smoking status: Never   • Smokeless tobacco: Never   Vaping Use   • Vaping Use: Never used   Substance and Sexual Activity   • Alcohol use: Not Currently     Comment: rarely   • Drug use: No   • Sexual activity: Not Currently   Other Topics Concern   • None   Social History Narrative   • None     Social Determinants of Health     Financial Resource Strain: Not on file   Food Insecurity: Not on file   Transportation Needs: Not on file   Physical Activity: Not on file   Stress: Not on file   Social Connections: Not on file   Intimate Partner Violence: Not on file   Housing Stability: Not on file      Family History:     Family History   Problem Relation Age of Onset   • Diabetes Mother    • Heart disease Mother         CHF   • "Hypertension Mother    • Kidney cancer Mother         5/2020   • Cancer Father         kidney   • Aneurysm Father         AAA   • COPD Father    • Kidney disease Father    • Kidney cancer Father         2010   • No Known Problems Sister    • Cancer Maternal Grandmother         bladder   • Cancer Paternal Grandfather         brain   • Stroke Paternal Grandfather    • No Known Problems Sister    • No Known Problems Sister       Current Medications:     Current Outpatient Medications   Medication Sig Dispense Refill   • buPROPion (WELLBUTRIN) 100 mg tablet Take 1 tablet (100 mg total) by mouth 2 (two) times a day 60 tablet 1   • CALCIUM PO Take by mouth     • citalopram (CeleXA) 10 mg tablet Take 1 tablet (10 mg total) by mouth daily 30 tablet 2   • ergocalciferol (VITAMIN D2) 50,000 units Take 1 capsule (50,000 Units total) by mouth 2 (two) times a week with meals 24 capsule 0   • fexofenadine (ALLEGRA) 180 MG tablet Take 1 tablet (180 mg total) by mouth daily 30 tablet 5   • furosemide (LASIX) 20 mg tablet One tab daily 30 tablet 5   • Multiple Vitamin (multivitamin) tablet Take 1 tablet by mouth daily Bariatric one a day     • norethindrone (AYGESTIN) 5 mg tablet      • nystatin (MYCOSTATIN) cream Apply topically 2 (two) times a day     • Semaglutide-Weight Management (WEGOVY) 0 25 MG/0 5ML Inject 0 5 mL (0 25 mg total) under the skin once a week 0 5 mL 4   • triamcinolone (KENALOG) 0 1 % cream Apply topically 2 (two) times a day     • CALCIUM CITRATE PO Take by mouth 3 (three) times a day (Patient not taking: Reported on 6/1/2023)       No current facility-administered medications for this visit  Allergies:      Allergies   Allergen Reactions   • Valtrex [Valacyclovir] Swelling      Physical Exam:     /80 (BP Location: Left arm, Patient Position: Sitting, Cuff Size: Standard)   Pulse 61   Temp 97 9 °F (36 6 °C) (Tympanic)   Resp 16   Ht 5' 6\" (1 676 m)   Wt 81 6 kg (180 lb)   SpO2 95%   BMI 29 05 " kg/m²     Physical Exam  Constitutional:       General: She is not in acute distress  Appearance: She is not toxic-appearing or diaphoretic  HENT:      Head: Normocephalic  Mouth/Throat:      Mouth: Mucous membranes are moist    Eyes:      Pupils: Pupils are equal, round, and reactive to light  Cardiovascular:      Rate and Rhythm: Normal rate and regular rhythm  Pulses: Normal pulses  Heart sounds: Normal heart sounds  Pulmonary:      Effort: Pulmonary effort is normal       Breath sounds: Normal breath sounds  Abdominal:      Palpations: Abdomen is soft  Tenderness: There is no abdominal tenderness  Musculoskeletal:         General: No deformity  Cervical back: Normal range of motion  Right lower leg: No edema  Left lower leg: No edema  Skin:     General: Skin is warm and dry  Neurological:      Mental Status: She is alert and oriented to person, place, and time     Psychiatric:         Mood and Affect: Mood normal          Behavior: Behavior normal           Dmitriy Melendrez DO  [address]

## 2023-06-05 ENCOUNTER — TELEPHONE (OUTPATIENT)
Dept: BARIATRICS | Facility: CLINIC | Age: 38
End: 2023-06-05

## 2023-06-05 DIAGNOSIS — E55.9 VITAMIN D INSUFFICIENCY: ICD-10-CM

## 2023-06-05 DIAGNOSIS — K91.2 POSTSURGICAL MALABSORPTION: Primary | ICD-10-CM

## 2023-06-05 NOTE — RESULT ENCOUNTER NOTE
Please advise patient that Vitamin D levels are mildly low  Both vitamin D and calcium are important for bone health and absorption of calcium  Low levels of Vitamin D can cause bone pain, fatigue, muscle cramping, and depressed mood  Please add an additional 3,000 IU of Vitamin D3 per day in addition to the 3,000IU of Vitamin D you are currently taking in your Bariatric MVI  Vitamin D is best absorbed with food, so take it with your largest meal of the day  It can be found inexpensively over the counter at your pharmacy or online  Repeat labs in 3-4 months  Remember to also take 1500 mg calcium citrate per day total (taken 500 mg at a time, three times per day)  It is very important that you separate each dose by at least 2 hours and take calcium at least 2 hours apart from MVI and iron      Thank you

## 2023-06-15 ENCOUNTER — TELEPHONE (OUTPATIENT)
Dept: FAMILY MEDICINE CLINIC | Facility: CLINIC | Age: 38
End: 2023-06-15

## 2023-06-15 DIAGNOSIS — E66.3 OVERWEIGHT WITH BODY MASS INDEX (BMI) OF 29 TO 29.9 IN ADULT: ICD-10-CM

## 2023-06-15 DIAGNOSIS — E28.2 PCOS (POLYCYSTIC OVARIAN SYNDROME): ICD-10-CM

## 2023-06-15 NOTE — TELEPHONE ENCOUNTER
vm on refill line:     Hi, my name is Jhon branham date of birth  [birth year] [age]  I'm currently taking Lugosi on the current dose of zero point two five  There is a shortage of the broccoli  So I was wondering if Doctor Kaila would be able to switch me over to the [facility]  If you have any questions you can give me a call back  Six, one, zero three five five five nine five six  Again with the shortage of we'll go by  I was wondering if Doctor Bright could switch me over to [facility] or one of the other medications that would be covered under my insurance  Again  Date of birth [birth year] [de-identified] [birth year]  My telephone number is six one, zero [phone]  And that message is for Doctor Kaila  He's covering for Doctor Danna Cortez who is my primary  Please let me know what the outcome would be  I did try sending a my chart message but it would not let me send a message to Doctor Bright  Thank you so much  Called pt to clarify who she is getting med thru because she is also requesting it thru [facility]  She confirmed she needs to get it thru Family med since copay is $65 every 3 months to which she cannot afford  Dr Danna Cortez asked to take it over, so she would like it thru [facility]  She said we have messed up the refill the last 2 months  She should be increased to  0 5 Wegovy, not   25   Since there is a current med shortage of  [facility] she is asking for [facility] to be prescribed in the equivalent of  5 [facility]

## 2023-06-19 NOTE — TELEPHONE ENCOUNTER
Saeed Mustafa,    I sent in Ozempic 0 5 mg once weekly for the patient which is equivalent to the Wegovy 0 5 mg weekly  Based on epic, preliminarily, the requested medication of Leonard Headley is not covered under her insurance  She will need to call the pharmacy to check what the cost of Mounjaro would be  Thank you for your help

## 2023-07-06 ENCOUNTER — OFFICE VISIT (OUTPATIENT)
Dept: FAMILY MEDICINE CLINIC | Facility: CLINIC | Age: 38
End: 2023-07-06
Payer: COMMERCIAL

## 2023-07-06 VITALS
DIASTOLIC BLOOD PRESSURE: 60 MMHG | RESPIRATION RATE: 21 BRPM | HEIGHT: 66 IN | SYSTOLIC BLOOD PRESSURE: 100 MMHG | WEIGHT: 178.5 LBS | BODY MASS INDEX: 28.69 KG/M2 | TEMPERATURE: 99.3 F | HEART RATE: 70 BPM | OXYGEN SATURATION: 100 %

## 2023-07-06 DIAGNOSIS — F41.9 ANXIETY AND DEPRESSION: ICD-10-CM

## 2023-07-06 DIAGNOSIS — E28.2 PCOS (POLYCYSTIC OVARIAN SYNDROME): ICD-10-CM

## 2023-07-06 DIAGNOSIS — F32.A ANXIETY AND DEPRESSION: ICD-10-CM

## 2023-07-06 DIAGNOSIS — E66.3 OVERWEIGHT WITH BODY MASS INDEX (BMI) OF 29 TO 29.9 IN ADULT: ICD-10-CM

## 2023-07-06 PROCEDURE — 99213 OFFICE O/P EST LOW 20 MIN: CPT | Performed by: FAMILY MEDICINE

## 2023-07-06 RX ORDER — METHYLPREDNISOLONE 4 MG/1
TABLET ORAL
COMMUNITY
Start: 2023-06-24

## 2023-07-06 RX ORDER — BUPROPION HYDROCHLORIDE 100 MG/1
100 TABLET ORAL 2 TIMES DAILY
Qty: 60 TABLET | Refills: 2 | Status: SHIPPED | OUTPATIENT
Start: 2023-07-06

## 2023-07-06 RX ORDER — OFLOXACIN 3 MG/ML
SOLUTION AURICULAR (OTIC)
COMMUNITY
Start: 2023-07-02

## 2023-07-06 NOTE — PROGRESS NOTES
Name: Nolberto Honeycutt      : 1985      MRN: [id number]  Encounter Provider: Elpidio Bledsoe DO  Encounter Date: 2023   Encounter department: [address]     1. Anxiety and depression        -     symptoms improving. No side effects  -     buPROPion (WELLBUTRIN) 100 mg tablet; Take 1 tablet (100 mg total) by mouth 2 (two) times a day    2. PCOS (polycystic ovarian syndrome)  3. Overweight with body mass index (BMI) of 29 to 29.9 in adult  - okay with being kept at current dose for now (from another office and weight responding well). -     semaglutide, 0.25 or 0.5 mg/dose, (Ozempic, 0.25 or 0.5 MG/DOSE,) 2 mg/3 mL injection pen; Inject 0.75 mL (0.5 mg total) under the skin every 7 days           Subjective      HPI   Follow-up anxiety/depression, symptoms improving. No side effects  Asked for refill on ozempic okay with being kept at current dose for now (from another office and weight responding well). Review of Systems   Constitutional: Negative for chills, fatigue and fever. HENT: Negative for congestion, ear pain, hearing loss, rhinorrhea, sore throat and trouble swallowing. Eyes: Negative for pain and visual disturbance. Respiratory: Negative for cough and shortness of breath. Cardiovascular: Negative for chest pain. Gastrointestinal: Negative for constipation, diarrhea, nausea and vomiting. Endocrine: Negative for polyuria. Genitourinary: Negative for difficulty urinating and dysuria. Musculoskeletal: Negative for arthralgias and myalgias. Neurological: Negative for dizziness, light-headedness and headaches.        Current Outpatient Medications on File Prior to Visit   Medication Sig   • citalopram (CeleXA) 10 mg tablet Take 1 tablet (10 mg total) by mouth daily   • ergocalciferol (VITAMIN D2) 50,000 units Take 1 capsule (50,000 Units total) by mouth 2 (two) times a week with meals   • fexofenadine (ALLEGRA) 180 MG tablet Take 1 tablet (180 mg total) by mouth daily   • furosemide (LASIX) 20 mg tablet One tab daily   • Multiple Vitamin (multivitamin) tablet Take 1 tablet by mouth daily Bariatric one a day   • norethindrone (AYGESTIN) 5 mg tablet    • nystatin (MYCOSTATIN) cream Apply topically 2 (two) times a day   • ofloxacin (FLOXIN) 0.3 % otic solution INSTILL 10 DROPS INTO AFFECTED EAR ONCE DAILY FOR 7 DAYS   • triamcinolone (KENALOG) 0.1 % cream Apply topically 2 (two) times a day   • CALCIUM CITRATE PO Take by mouth 3 (three) times a day (Patient not taking: Reported on 6/1/2023)   • CALCIUM PO Take by mouth (Patient not taking: Reported on 7/6/2023)   • methylPREDNISolone 4 MG tablet therapy pack TAKE BY MOUTH AS DIRECTED ON INSIDE OF PACKAGE (Patient not taking: Reported on 7/6/2023)       Objective     /60 (BP Location: Left arm, Patient Position: Sitting, Cuff Size: Standard)   Pulse 70   Temp 99.3 °F (37.4 °C) (Temporal)   Resp 21   Ht 5' 6" (1.676 m)   Wt 81 kg (178 lb 8 oz)   LMP 06/23/2023 (Exact Date)   SpO2 100%   BMI 28.81 kg/m²     Physical Exam  Constitutional:       General: She is not in acute distress. Appearance: She is not toxic-appearing. HENT:      Head: Normocephalic and atraumatic. Eyes:      Pupils: Pupils are equal, round, and reactive to light. Cardiovascular:      Rate and Rhythm: Normal rate and regular rhythm. Pulmonary:      Effort: Pulmonary effort is normal.   Abdominal:      Palpations: Abdomen is soft. Tenderness: There is no abdominal tenderness. Musculoskeletal:         General: No deformity. Cervical back: Normal range of motion. Right lower leg: No edema. Left lower leg: No edema. Skin:     General: Skin is warm and dry. Neurological:      Mental Status: She is alert and oriented to person, place, and time.    Psychiatric:         Mood and Affect: Mood normal.         Behavior: Behavior normal.       PHQ-2/9 Depression Screening    Little interest or pleasure in doing things: 0 - not at all  Feeling down, depressed, or hopeless: 0 - not at all  Trouble falling or staying asleep, or sleeping too much: 2 - more than half the days  Feeling tired or having little energy: 2 - more than half the days  Poor appetite or overeatin - not at all  Feeling bad about yourself - or that you are a failure or have let yourself or your family down: 1 - several days  Trouble concentrating on things, such as reading the newspaper or watching television: 1 - several days  Moving or speaking so slowly that other people could have noticed.  Or the opposite - being so fidgety or restless that you have been moving around a lot more than usual: 0 - not at all  Thoughts that you would be better off dead, or of hurting yourself in some way: 0 - not at all  PHQ-2 Score: 0  PHQ-2 Interpretation: Negative depression screen  PHQ-9 Score: 6   PHQ-9 Interpretation: Mild depression            [employer], DO

## 2023-07-13 ENCOUNTER — HOSPITAL ENCOUNTER (OUTPATIENT)
Dept: INFUSION CENTER | Facility: HOSPITAL | Age: 38
Discharge: HOME | End: 2023-07-13
Attending: INTERNAL MEDICINE

## 2023-08-15 DIAGNOSIS — F41.9 ANXIETY AND DEPRESSION: ICD-10-CM

## 2023-08-15 DIAGNOSIS — F32.A ANXIETY AND DEPRESSION: ICD-10-CM

## 2023-08-15 RX ORDER — BUPROPION HYDROCHLORIDE 100 MG/1
100 TABLET ORAL 2 TIMES DAILY
Qty: 60 TABLET | Refills: 2 | Status: SHIPPED | OUTPATIENT
Start: 2023-08-15

## 2023-08-29 ENCOUNTER — TELEPHONE (OUTPATIENT)
Dept: FAMILY MEDICINE CLINIC | Facility: CLINIC | Age: 38
End: 2023-08-29

## 2023-08-29 NOTE — TELEPHONE ENCOUNTER
Eusebio Collins is calling from Pre Encounter Dept requesting Insurance Referral for upcoming (Mammo)  Appointment scheduled 8/31/23. Referral has been submitted, copy has been attached to this encounter.

## 2023-08-31 ENCOUNTER — HOSPITAL ENCOUNTER (EMERGENCY)
Facility: HOSPITAL | Age: 38
Discharge: HOME | End: 2023-08-31
Attending: EMERGENCY MEDICINE | Admitting: EMERGENCY MEDICINE
Payer: COMMERCIAL

## 2023-08-31 ENCOUNTER — APPOINTMENT (EMERGENCY)
Dept: RADIOLOGY | Facility: HOSPITAL | Age: 38
End: 2023-08-31
Payer: COMMERCIAL

## 2023-08-31 ENCOUNTER — TELEPHONE (OUTPATIENT)
Dept: FAMILY MEDICINE CLINIC | Facility: CLINIC | Age: 38
End: 2023-08-31

## 2023-08-31 ENCOUNTER — HOSPITAL ENCOUNTER (OUTPATIENT)
Dept: RADIOLOGY | Facility: HOSPITAL | Age: 38
Discharge: HOME | End: 2023-08-31
Payer: COMMERCIAL

## 2023-08-31 VITALS — HEIGHT: 66 IN | WEIGHT: 174 LBS | BODY MASS INDEX: 27.97 KG/M2

## 2023-08-31 VITALS
HEIGHT: 66 IN | TEMPERATURE: 99.9 F | HEART RATE: 80 BPM | SYSTOLIC BLOOD PRESSURE: 113 MMHG | BODY MASS INDEX: 27.97 KG/M2 | OXYGEN SATURATION: 100 % | RESPIRATION RATE: 18 BRPM | WEIGHT: 174 LBS | DIASTOLIC BLOOD PRESSURE: 59 MMHG

## 2023-08-31 DIAGNOSIS — Z12.31 ENCOUNTER FOR SCREENING MAMMOGRAM FOR MALIGNANT NEOPLASM OF BREAST: ICD-10-CM

## 2023-08-31 DIAGNOSIS — R11.0 NAUSEA: ICD-10-CM

## 2023-08-31 DIAGNOSIS — R10.9 ABDOMINAL PAIN: Primary | ICD-10-CM

## 2023-08-31 LAB
ALBUMIN SERPL BCP-MCNC: 3.9 G/DL (ref 3.5–5)
ALP SERPL-CCNC: 77 U/L (ref 34–104)
ALT SERPL W P-5'-P-CCNC: 9 U/L (ref 7–52)
ANION GAP SERPL CALCULATED.3IONS-SCNC: 6 MMOL/L
AST SERPL W P-5'-P-CCNC: 10 U/L (ref 13–39)
BACTERIA UR QL AUTO: ABNORMAL /HPF
BASOPHILS # BLD AUTO: 0.02 THOUSANDS/ÂΜL (ref 0–0.1)
BASOPHILS NFR BLD AUTO: 1 % (ref 0–1)
BILIRUB SERPL-MCNC: 0.75 MG/DL (ref 0.2–1)
BILIRUB UR QL STRIP: NEGATIVE
BUN SERPL-MCNC: 8 MG/DL (ref 5–25)
CALCIUM SERPL-MCNC: 8.9 MG/DL (ref 8.4–10.2)
CHLORIDE SERPL-SCNC: 105 MMOL/L (ref 96–108)
CLARITY UR: ABNORMAL
CO2 SERPL-SCNC: 27 MMOL/L (ref 21–32)
COLOR UR: YELLOW
CREAT SERPL-MCNC: 0.9 MG/DL (ref 0.6–1.3)
EOSINOPHIL # BLD AUTO: 0.05 THOUSAND/ÂΜL (ref 0–0.61)
EOSINOPHIL NFR BLD AUTO: 1 % (ref 0–6)
ERYTHROCYTE [DISTWIDTH] IN BLOOD BY AUTOMATED COUNT: 13.2 % (ref 11.6–15.1)
EXT PREGNANCY TEST URINE: NEGATIVE
EXT. CONTROL: NORMAL
GFR SERPL CREATININE-BSD FRML MDRD: 80 ML/MIN/1.73SQ M
GLUCOSE SERPL-MCNC: 85 MG/DL (ref 65–140)
GLUCOSE UR STRIP-MCNC: NEGATIVE MG/DL
HCT VFR BLD AUTO: 35.5 % (ref 34.8–46.1)
HGB BLD-MCNC: 11.1 G/DL (ref 11.5–15.4)
HGB UR QL STRIP.AUTO: NEGATIVE
IMM GRANULOCYTES # BLD AUTO: 0.01 THOUSAND/UL (ref 0–0.2)
IMM GRANULOCYTES NFR BLD AUTO: 0 % (ref 0–2)
KETONES UR STRIP-MCNC: ABNORMAL MG/DL
LEUKOCYTE ESTERASE UR QL STRIP: NEGATIVE
LIPASE SERPL-CCNC: 9 U/L (ref 11–82)
LYMPHOCYTES # BLD AUTO: 0.52 THOUSANDS/ÂΜL (ref 0.6–4.47)
LYMPHOCYTES NFR BLD AUTO: 13 % (ref 14–44)
MCH RBC QN AUTO: 25.8 PG (ref 26.8–34.3)
MCHC RBC AUTO-ENTMCNC: 31.3 G/DL (ref 31.4–37.4)
MCV RBC AUTO: 83 FL (ref 82–98)
MONOCYTES # BLD AUTO: 0.34 THOUSAND/ÂΜL (ref 0.17–1.22)
MONOCYTES NFR BLD AUTO: 9 % (ref 4–12)
NEUTROPHILS # BLD AUTO: 2.97 THOUSANDS/ÂΜL (ref 1.85–7.62)
NEUTS SEG NFR BLD AUTO: 76 % (ref 43–75)
NITRITE UR QL STRIP: NEGATIVE
NON-SQ EPI CELLS URNS QL MICRO: ABNORMAL /HPF
NRBC BLD AUTO-RTO: 0 /100 WBCS
PH UR STRIP.AUTO: 6 [PH]
PLATELET # BLD AUTO: 204 THOUSANDS/UL (ref 149–390)
PMV BLD AUTO: 10.6 FL (ref 8.9–12.7)
POTASSIUM SERPL-SCNC: 3.9 MMOL/L (ref 3.5–5.3)
PROT SERPL-MCNC: 6.6 G/DL (ref 6.4–8.4)
PROT UR STRIP-MCNC: NEGATIVE MG/DL
RBC # BLD AUTO: 4.3 MILLION/UL (ref 3.81–5.12)
RBC #/AREA URNS AUTO: ABNORMAL /HPF
SODIUM SERPL-SCNC: 138 MMOL/L (ref 135–147)
SP GR UR STRIP.AUTO: 1.02 (ref 1–1.03)
UROBILINOGEN UR QL STRIP.AUTO: 1 E.U./DL
WBC # BLD AUTO: 3.91 THOUSAND/UL (ref 4.31–10.16)
WBC #/AREA URNS AUTO: ABNORMAL /HPF

## 2023-08-31 PROCEDURE — 74177 CT ABD & PELVIS W/CONTRAST: CPT

## 2023-08-31 PROCEDURE — 99284 EMERGENCY DEPT VISIT MOD MDM: CPT

## 2023-08-31 PROCEDURE — 99285 EMERGENCY DEPT VISIT HI MDM: CPT | Performed by: EMERGENCY MEDICINE

## 2023-08-31 PROCEDURE — 80053 COMPREHEN METABOLIC PANEL: CPT | Performed by: EMERGENCY MEDICINE

## 2023-08-31 PROCEDURE — 96374 THER/PROPH/DIAG INJ IV PUSH: CPT

## 2023-08-31 PROCEDURE — 36415 COLL VENOUS BLD VENIPUNCTURE: CPT | Performed by: EMERGENCY MEDICINE

## 2023-08-31 PROCEDURE — 83690 ASSAY OF LIPASE: CPT | Performed by: EMERGENCY MEDICINE

## 2023-08-31 PROCEDURE — 96361 HYDRATE IV INFUSION ADD-ON: CPT

## 2023-08-31 PROCEDURE — 81025 URINE PREGNANCY TEST: CPT | Performed by: EMERGENCY MEDICINE

## 2023-08-31 PROCEDURE — 77063 BREAST TOMOSYNTHESIS BI: CPT

## 2023-08-31 PROCEDURE — 85025 COMPLETE CBC W/AUTO DIFF WBC: CPT | Performed by: EMERGENCY MEDICINE

## 2023-08-31 PROCEDURE — 77067 SCR MAMMO BI INCL CAD: CPT

## 2023-08-31 PROCEDURE — 81001 URINALYSIS AUTO W/SCOPE: CPT | Performed by: EMERGENCY MEDICINE

## 2023-08-31 RX ORDER — HYDROMORPHONE HCL/PF 1 MG/ML
0.5 SYRINGE (ML) INJECTION ONCE
Status: COMPLETED | OUTPATIENT
Start: 2023-08-31 | End: 2023-08-31

## 2023-08-31 RX ORDER — KETOROLAC TROMETHAMINE 30 MG/ML
15 INJECTION, SOLUTION INTRAMUSCULAR; INTRAVENOUS ONCE
Status: DISCONTINUED | OUTPATIENT
Start: 2023-08-31 | End: 2023-08-31

## 2023-08-31 RX ORDER — ONDANSETRON 4 MG/1
4 TABLET, FILM COATED ORAL EVERY 6 HOURS
Qty: 12 TABLET | Refills: 0 | Status: SHIPPED | OUTPATIENT
Start: 2023-08-31

## 2023-08-31 RX ADMIN — HYDROMORPHONE HYDROCHLORIDE 0.5 MG: 1 INJECTION, SOLUTION INTRAMUSCULAR; INTRAVENOUS; SUBCUTANEOUS at 11:50

## 2023-08-31 RX ADMIN — SODIUM CHLORIDE 1000 ML: 0.9 INJECTION, SOLUTION INTRAVENOUS at 11:50

## 2023-08-31 RX ADMIN — IOHEXOL 50 ML: 240 INJECTION, SOLUTION INTRATHECAL; INTRAVASCULAR; INTRAVENOUS; ORAL at 12:54

## 2023-08-31 RX ADMIN — IOHEXOL 100 ML: 350 INJECTION, SOLUTION INTRAVENOUS at 14:52

## 2023-08-31 NOTE — DISCHARGE INSTRUCTIONS
Your labs and imaging today did not show any significant abnormality. Use the prescribed Zofran as needed for nausea. Follow-up with your primary care provider in 1 week for reassessment. If you have any severe worsening of abdominal pain, severe nausea and are not able to drink fluids, return to the emergency department.

## 2023-08-31 NOTE — TELEPHONE ENCOUNTER
Fax received from [facility] requesting entire medical record as she is changing PCP's. Sent to [facility] [address].      Care Gap- please remove Dr Jarek Lorenzana as PCP

## 2023-08-31 NOTE — ED PROVIDER NOTES
History  Chief Complaint   Patient presents with   • Abdominal Pain     Patient having pain like when she had her gallbladder removed two years ago,no vomiting, but has nausea     HPI  Patient is a 42-year-old female with prior history of cholecystectomy in 2021, gastric bypass presenting for evaluation of epigastric abdominal pain. Patient states she woke up with symptoms at 06 30, states that they have been persistent since that time, associated with nausea without vomiting. Patient states that the pain feels sharp and crampy. Patient states that she has had a few similar episodes in the past but never this persistent and never requiring medical evaluation. Patient denies fevers, chills, rigors. Patient denies lower abdominal pain. Patient denies flank pain, dysuria, hematuria, urinary frequency. Patient states that she has not eaten anything since onset of symptoms. Patient states that both gastric bypass and cholecystectomy were uncomplicated. Prior to Admission Medications   Prescriptions Last Dose Informant Patient Reported? Taking?    CALCIUM CITRATE PO   Yes No   Sig: Take by mouth 3 (three) times a day   Patient not taking: Reported on 6/1/2023   CALCIUM PO Not Taking  Yes No   Sig: Take by mouth   Patient not taking: Reported on 7/6/2023   Multiple Vitamin (multivitamin) tablet  Self Yes Yes   Sig: Take 1 tablet by mouth daily Bariatric one a day   buPROPion (WELLBUTRIN) 100 mg tablet   No Yes   Sig: Take 1 tablet (100 mg total) by mouth 2 (two) times a day   citalopram (CeleXA) 10 mg tablet   No Yes   Sig: Take 1 tablet (10 mg total) by mouth daily   ergocalciferol (VITAMIN D2) 50,000 units   No No   Sig: Take 1 capsule (50,000 Units total) by mouth 2 (two) times a week with meals   fexofenadine (ALLEGRA) 180 MG tablet   No Yes   Sig: Take 1 tablet (180 mg total) by mouth daily   furosemide (LASIX) 20 mg tablet Not Taking  No No   Sig: One tab daily   Patient not taking: Reported on 8/31/2023 methylPREDNISolone 4 MG tablet therapy pack   Yes No   Sig: TAKE BY MOUTH AS DIRECTED ON INSIDE OF PACKAGE   Patient not taking: Reported on 2023   norethindrone (AYGESTIN) 5 mg tablet   Yes No   nystatin (MYCOSTATIN) cream   Yes Yes   Sig: Apply topically 2 (two) times a day   ofloxacin (FLOXIN) 0.3 % otic solution   Yes No   Sig: INSTILL 10 DROPS INTO AFFECTED EAR ONCE DAILY FOR 7 DAYS   semaglutide, 0.25 or 0.5 mg/dose, (Ozempic, 0.25 or 0.5 MG/DOSE,) 2 mg/3 mL injection pen   No Yes   Sig: Inject 0.75 mL (0.5 mg total) under the skin every 7 days   triamcinolone (KENALOG) 0.1 % cream   Yes Yes   Sig: Apply topically 2 (two) times a day      Facility-Administered Medications: None       Past Medical History:   Diagnosis Date   • Allergic rhinitis    • Anemia     ok currently   • Anxiety    • Asthma     activity induced   • Benign neoplasm of skin of trunk    • Morbid obesity with BMI of 40.0-44.9, adult (HCC)    • Polycystic ovary syndrome    • Postgastrectomy malabsorption    • Skin tag    • Tinea versicolor        Past Surgical History:   Procedure Laterality Date   • CERVICAL CERCLAGE     •  SECTION  2017   • PARAESOPHAGEAL HERNIA REPAIR N/A 2020    Procedure: REPAIR HERNIA PARAESOPHAGEAL  LAPAROSCOPIC;  Surgeon: Marlin Phelan MD;  Location: [facility];  Service: Bariatrics   • MO LAPAROSCOPY SURG CHOLECYSTECTOMY N/A 2021    Procedure: CHOLECYSTECTOMY LAPAROSCOPIC;  Surgeon: Estefania Khanna MD;  Location: [facility];  Service: General   • [facility] [address] [place] [address] W/BYP JENNIFER-EN-Y LIMB <150 CM N/A 2020    Procedure: BYPASS GASTRIC  JENNIFER-EN-Y LAPAROSCOPIC;  Surgeon: Marlin Phelan MD;  Location: [facility];  Service: Bariatrics   • TONSILLECTOMY     • TUBAL LIGATION         Family History   Problem Relation Age of Onset   • Diabetes Mother    • Heart disease Mother         CHF   • Hypertension Mother    • Kidney cancer Mother         2020   • Cancer Father kidney   • Aneurysm Father         AAA   • COPD Father    • Kidney disease Father    • Kidney cancer Father         2010   • No Known Problems Sister    • No Known Problems Sister    • No Known Problems Sister    • Cancer Maternal Grandmother         bladder   • Cancer Paternal Grandfather         brain   • Stroke Paternal Grandfather      I have reviewed and agree with the history as documented. E-Cigarette/Vaping   • E-Cigarette Use Never User      E-Cigarette/Vaping Substances   • Nicotine No    • THC No    • CBD No    • Flavoring No    • Other No    • Unknown No      Social History     Tobacco Use   • Smoking status: Never   • Smokeless tobacco: Never   Vaping Use   • Vaping Use: Never used   Substance Use Topics   • Alcohol use: Yes     Comment: socially   • Drug use: No       Review of Systems   Constitutional: Negative for chills, fatigue and fever. Respiratory: Negative for cough and stridor. Cardiovascular: Negative for chest pain. Gastrointestinal: Positive for abdominal pain. Negative for abdominal distention, diarrhea, nausea and vomiting. Genitourinary: Negative for dysuria. Musculoskeletal: Negative for arthralgias and myalgias. Neurological: Negative for headaches. Psychiatric/Behavioral: Negative for confusion. All other systems reviewed and are negative. Physical Exam  Physical Exam  Vitals and nursing note reviewed. Constitutional:       General: She is not in acute distress. Appearance: Normal appearance. She is not ill-appearing, toxic-appearing or diaphoretic. Comments: Somewhat uncomfortable appearing but nontoxic nondistressed   HENT:      Head: Normocephalic and atraumatic. Comments: Moist mucous membranes     Right Ear: External ear normal.      Left Ear: External ear normal.   Eyes:      General:         Right eye: No discharge. Left eye: No discharge.    Cardiovascular:      Comments: Regular rate and rhythm, no murmurs rubs or gallops, extremities warm and well-perfused. Pulmonary:      Effort: No respiratory distress. Comments: No increased work of breathing. Speaking in complete sentences. Lungs clear to auscultation bilaterally without wheezes, rales, rhonchi. Satting 99% on room air indicating adequate oxygenation. Abdominal:      General: There is no distension. Comments: Abdomen nondistended with normal bowel sounds. Moderate epigastric tenderness without rigidity, rebound, guarding. Musculoskeletal:         General: No deformity. Cervical back: Normal range of motion. Skin:     Findings: No lesion or rash. Neurological:      Mental Status: She is alert and oriented to person, place, and time. Mental status is at baseline.       Comments: AAOx4, pleasant, interactive   Psychiatric:         Mood and Affect: Mood and affect normal.         Vital Signs  ED Triage Vitals   Temperature Pulse Respirations Blood Pressure SpO2   08/31/23 1052 08/31/23 1052 08/31/23 1052 08/31/23 1052 08/31/23 1052   99.9 °F (37.7 °C) 79 20 131/69 99 %      Temp Source Heart Rate Source Patient Position - Orthostatic VS BP Location FiO2 (%)   08/31/23 1052 08/31/23 1052 08/31/23 1052 08/31/23 1052 --   Tympanic Monitor Sitting Right arm       Pain Score       08/31/23 1150       7           Vitals:    08/31/23 1052 08/31/23 1255 08/31/23 1631   BP: 131/69 125/69 113/59   Pulse: 79 80 80   Patient Position - Orthostatic VS: Sitting Sitting Lying         Visual Acuity      ED Medications  Medications   sodium chloride 0.9 % bolus 1,000 mL (0 mL Intravenous Stopped 8/31/23 1350)   HYDROmorphone (DILAUDID) injection 0.5 mg (0.5 mg Intravenous Given 8/31/23 1150)   iohexol (OMNIPAQUE) 240 MG/ML solution 50 mL (50 mL Oral Given 8/31/23 1254)   iohexol (OMNIPAQUE) 350 MG/ML injection (SINGLE-DOSE) 100 mL (100 mL Intravenous Given 8/31/23 1452)       Diagnostic Studies  Results Reviewed     Procedure Component Value Units Date/Time    Lipase [[id number]]  (Abnormal) Collected: 08/31/23 1153    Lab Status: Final result Specimen: Blood from Line, Venous Updated: 08/31/23 1225     Lipase 9 u/L     Comprehensive metabolic panel [[id number]]  (Abnormal) Collected: 08/31/23 1153    Lab Status: Final result Specimen: Blood from Line, Venous Updated: 08/31/23 1225     Sodium 138 mmol/L      Potassium 3.9 mmol/L      Chloride 105 mmol/L      CO2 27 mmol/L      ANION GAP 6 mmol/L      BUN 8 mg/dL      Creatinine 0.90 mg/dL      Glucose 85 mg/dL      Calcium 8.9 mg/dL      AST 10 U/L      ALT 9 U/L      Alkaline Phosphatase 77 U/L      Total Protein 6.6 g/dL      Albumin 3.9 g/dL      Total Bilirubin 0.75 mg/dL      eGFR 80 ml/min/1.73sq m     Narrative:      National Kidney Disease Foundation guidelines for Chronic Kidney Disease (CKD):   •  Stage 1 with normal or high GFR (GFR > 90 mL/min/1.73 square meters)  •  Stage 2 Mild CKD (GFR = 60-89 mL/min/1.73 square meters)  •  Stage 3A Moderate CKD (GFR = 45-59 mL/min/1.73 square meters)  •  Stage 3B Moderate CKD (GFR = 30-44 mL/min/1.73 square meters)  •  Stage 4 Severe CKD (GFR = 15-29 mL/min/1.73 square meters)  •  Stage 5 End Stage CKD (GFR <15 mL/min/1.73 square meters)  Note: GFR calculation is accurate only with a steady state creatinine    POCT pregnancy, urine [[id number]]  (Normal) Resulted: 08/31/23 1137    Lab Status: Final result Updated: 08/31/23 1222     EXT Preg Test, Ur Negative     Control Valid    Urinalysis with microscopic [[id number]]  (Abnormal) Collected: 08/31/23 1153    Lab Status: Final result Specimen: Urine, Other Updated: 08/31/23 1217     Color, UA Yellow     Clarity, UA Slightly Cloudy     Specific Gravity, UA 1.020     pH, UA 6.0     Leukocytes, UA Negative     Nitrite, UA Negative     Protein, UA Negative mg/dl      Glucose, UA Negative mg/dl      Ketones, UA Trace mg/dl      Urobilinogen, UA 1.0 E.U./dl      Bilirubin, UA Negative     Occult Blood, UA Negative     RBC, UA None Seen /hpf WBC, UA None Seen /hpf      Epithelial Cells Occasional /hpf      Bacteria, UA Occasional /hpf     CBC and differential [[id number]]  (Abnormal) Collected: 08/31/23 1153    Lab Status: Final result Specimen: Blood from Line, Venous Updated: 08/31/23 1203     WBC 3.91 Thousand/uL      RBC 4.30 Million/uL      Hemoglobin 11.1 g/dL      Hematocrit 35.5 %      MCV 83 fL      MCH 25.8 pg      MCHC 31.3 g/dL      RDW 13.2 %      MPV 10.6 fL      Platelets 963 Thousands/uL      nRBC 0 /100 WBCs      Neutrophils Relative 76 %      Immat GRANS % 0 %      Lymphocytes Relative 13 %      Monocytes Relative 9 %      Eosinophils Relative 1 %      Basophils Relative 1 %      Neutrophils Absolute 2.97 Thousands/µL      Immature Grans Absolute 0.01 Thousand/uL      Lymphocytes Absolute 0.52 Thousands/µL      Monocytes Absolute 0.34 Thousand/µL      Eosinophils Absolute 0.05 Thousand/µL      Basophils Absolute 0.02 Thousands/µL                  CT abdomen pelvis with contrast   Final Result by Kaye St DO (08/31 1550)   No CT explanation for patient's symptoms. Mild hepatomegaly. Mild splenomegaly. Gastric bypass with no visible leak. Questionable small bowel-small bowel intussusception in the left mid abdomen which are commonly self-limited and do not require treatment. No evidence of obstruction. Somewhat    bulky heterogeneous uterus with no discrete mass. Indeterminate debris in the vaginal fornix. Workstation performed: [id number]                    Procedures  Procedures         ED Course                               SBIRT 20yo+    Flowsheet Row Most Recent Value   Initial Alcohol Screen: US AUDIT-C     1. How often do you have a drink containing alcohol? 0 Filed at: 08/31/2023 1054   2. How many drinks containing alcohol do you have on a typical day you are drinking? 0 Filed at: 08/31/2023 1054   3a. Male UNDER 65: How often do you have five or more drinks on one occasion?  0 Filed at: 08/31/2023 1054   3b. FEMALE Any Age, or MALE 65+: How often do you have 4 or more drinks on one occassion? 0 Filed at: 08/31/2023 1054   Audit-C Score 0 Filed at: 08/31/2023 1057   GERALDINE: How many times in the past year have you. .. Used an illegal drug or used a prescription medication for non-medical reasons? Never Filed at: 08/31/2023 1054                    Medical Decision Making  I obtained history from the patient. I reviewed external medical documentation. Patient was evaluated by primary care provider on 7/6/2023 for anxiety, depression, PCOS, notes that she takes Ozempic. Differential diagnosis includes was not limited to gastritis, peptic ulcer disease, pancreatitis, enterocolitis, bowel obstruction, bowel perforation, medication side effect of Ozempic. I ordered and reviewed labs including CBC, CMP, lipase which were all grossly unremarkable. I ordered and reviewed a CT abdomen pelvis with p.o. and IV contrast given her history of gastric bypass. Patient without significant lab derangements. CT demonstrates hepatosplenomegaly, possible resolving small bowel small bowel intussusception. Patient pain-free on reassessment, denies any ongoing symptoms. Updated on imaging findings, prescribed Zofran if she has any additional nausea, discharged with strict return precautions. Amount and/or Complexity of Data Reviewed  Labs: ordered. Radiology: ordered. Risk  Prescription drug management.           Disposition  Final diagnoses:   Abdominal pain   Nausea     Time reflects when diagnosis was documented in both MDM as applicable and the Disposition within this note     Time User Action Codes Description Comment    8/31/2023  4:23 PM M[place] Miss Add [R10.9] Abdominal pain     8/31/2023  4:24 PM [place] Miss Add [R11.0] Nausea       ED Disposition     ED Disposition   Discharge    Condition   Stable    Date/Time   Thu Aug 31, 2023  4:22 PM    Comment   Marisol Weber discharge to home/self care.               Follow-up Information     Follow up With Specialties Details Why Contact Info Additional Information    [address] Emergency Department Emergency Medicine  If symptoms worsen [address] [address]  [address] [facility] Emergency Department, [address] [address] , \[facility] [place] [facility]\"", [id number]          Patient's Medications   Discharge Prescriptions    ONDANSETRON (ZOFRAN) 4 MG TABLET    Take 1 tablet (4 mg total) by mouth every 6 (six) hours       Start Date: 8/31/2023 End Date: --       Order Dose: 4 mg       Quantity: 12 tablet    Refills: 0       No discharge procedures on file.     PDMP Review       Value Time User    PDMP Reviewed  Yes 2/10/2023  1:01 PM Staci Meléndez DO          ED Provider  Electronically Signed by           Soha Magana MD  08/31/23 2005

## 2023-09-01 NOTE — TELEPHONE ENCOUNTER
08/31/23 11:10 PM        The office's request has been received, reviewed, and the patient chart updated. The PCP has successfully been removed with a patient attribution note. This message will now be completed.         Thank you  Zeferino Albarran

## 2023-09-07 ENCOUNTER — CONSULT (OUTPATIENT)
Dept: PLASTIC SURGERY | Facility: CLINIC | Age: 38
End: 2023-09-07
Payer: COMMERCIAL

## 2023-09-07 VITALS
SYSTOLIC BLOOD PRESSURE: 130 MMHG | HEIGHT: 66 IN | WEIGHT: 174 LBS | BODY MASS INDEX: 27.97 KG/M2 | DIASTOLIC BLOOD PRESSURE: 80 MMHG

## 2023-09-07 DIAGNOSIS — E65 ABDOMINAL PANNUS: Primary | ICD-10-CM

## 2023-09-07 PROCEDURE — 99204 OFFICE O/P NEW MOD 45 MIN: CPT | Performed by: STUDENT IN AN ORGANIZED HEALTH CARE EDUCATION/TRAINING PROGRAM

## 2023-09-07 NOTE — PROGRESS NOTES
Assessment and Plan:  Ms. Welby Saint is a 34 y.o. female presenting with sequelae of excess skin of thighs and abdomen s/p MWL    We discussed the procedure, risks, benefits, alternatives and postoperative instructions and expectations. The patient would benefit from panniculectomy as well as a horizontal thigh lift to combat her excess skin. All patient's questions were answered and she voiced understanding. Booking form created. Will wait to hear from insurance prior to providing cosmetic quotes, particularly for the thighplasty. History of Present Illness:   Ms. Welby Saint is a 34 y.o. female presenting with excessive skin and history of MWL. She has lost over 100 lbs. Her weight has fluctuated within 10 lbs after IUD/starting semaglutide. She maintains this with appropriate diet and exercise, primarily walking. 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 She denies nicotine use. She reports rashes particularly in her umbilicus and frequent yeast infections. She also has issues with folliculitis of her thighs. She is constantly wearing compression to combat her excess skin. Review of Systems:  A 12 point ROS was performed and negative except per HPI.     Past Medical History:  Past Medical History:   Diagnosis Date   • Allergic rhinitis    • Anemia     ok currently   • Anxiety    • Asthma     activity induced   • Benign neoplasm of skin of trunk    • Morbid obesity with BMI of 40.0-44.9, adult (HCC)    • Polycystic ovary syndrome    • Postgastrectomy malabsorption    • Skin tag    • Tinea versicolor        Past Surgical History:  Past Surgical History:   Procedure Laterality Date   • CERVICAL CERCLAGE     •  SECTION  2017   • PARAESOPHAGEAL HERNIA REPAIR N/A 2020    Procedure: REPAIR HERNIA PARAESOPHAGEAL  LAPAROSCOPIC;  Surgeon: Sarah Beth Simons MD;  Location: [facility];  Service: Bariatrics   • GA LAPAROSCOPY SURG CHOLECYSTECTOMY N/A 2021    Procedure: CHOLECYSTECTOMY LAPAROSCOPIC;  Surgeon: Genell Apgar, MD;  Location: [facility];  Service: General   • [place] LAPS GSTR RSTCV [address] [facility] W/BYP JENNIFER-EN-Y LIMB <150 CM N/A 2020    Procedure: BYPASS GASTRIC  JENNIFER-EN-Y LAPAROSCOPIC;  Surgeon: Sundeep Nagy MD;  Location: [facility];  Service: Bariatrics   • TONSILLECTOMY     • TUBAL LIGATION         Social History:  Social History     Tobacco Use   • Smoking status: Never   • Smokeless tobacco: Never   Vaping Use   • Vaping Use: Never used   Substance Use Topics   • Alcohol use: Yes     Comment: socially   • Drug use: No       Family History:  Family History   Problem Relation Age of Onset   • Diabetes Mother    • Heart disease Mother         CHF   • Hypertension Mother    • Kidney cancer Mother         2020   • Cancer Father         kidney   • Aneurysm Father         AAA   • COPD Father    • Kidney disease Father    • Kidney cancer Father            • No Known Problems Sister    • No Known Problems Sister    • No Known Problems Sister    • Cancer Maternal Grandmother         bladder   • Cancer Paternal Grandfather         brain   • Stroke Paternal Grandfather        Allergies:   Allergies   Allergen Reactions   • Valtrex [Valacyclovir] Swelling       Medications:  Current Outpatient Medications on File Prior to Visit   Medication Sig Dispense Refill   • buPROPion (WELLBUTRIN) 100 mg tablet Take 1 tablet (100 mg total) by mouth 2 (two) times a day 60 tablet 2   • CALCIUM CITRATE PO Take by mouth 3 (three) times a day (Patient not taking: Reported on 2023)     • CALCIUM PO Take by mouth (Patient not taking: Reported on 2023)     • citalopram (CeleXA) 10 mg tablet Take 1 tablet (10 mg total) by mouth daily 30 tablet 2   • ergocalciferol (VITAMIN D2) 50,000 units Take 1 capsule (50,000 Units total) by mouth 2 (two) times a week with meals 24 capsule 0   • fexofenadine (ALLEGRA) 180 MG tablet Take 1 tablet (180 mg total) by mouth daily 30 tablet 5   • furosemide (LASIX) 20 mg tablet One tab daily (Patient not taking: Reported on 8/31/2023) 30 tablet 5   • methylPREDNISolone 4 MG tablet therapy pack TAKE BY MOUTH AS DIRECTED ON INSIDE OF PACKAGE (Patient not taking: Reported on 7/6/2023)     • Multiple Vitamin (multivitamin) tablet Take 1 tablet by mouth daily Bariatric one a day     • norethindrone (AYGESTIN) 5 mg tablet      • nystatin (MYCOSTATIN) cream Apply topically 2 (two) times a day     • ofloxacin (FLOXIN) 0.3 % otic solution INSTILL 10 DROPS INTO AFFECTED EAR ONCE DAILY FOR 7 DAYS     • ondansetron (ZOFRAN) 4 mg tablet Take 1 tablet (4 mg total) by mouth every 6 (six) hours 12 tablet 0   • semaglutide, 0.25 or 0.5 mg/dose, (Ozempic, 0.25 or 0.5 MG/DOSE,) 2 mg/3 mL injection pen Inject 0.75 mL (0.5 mg total) under the skin every 7 days 9 mL 1   • triamcinolone (KENALOG) 0.1 % cream Apply topically 2 (two) times a day       No current facility-administered medications on file prior to visit. Physical Examination:  LMP 08/20/2023   Estimated body mass index is 28.08 kg/m² as calculated from the following:    Height as of 8/31/23: 5' 6" (1.676 m). Weight as of 8/31/23: 78.9 kg (174 lb). General: NAD, well appearing, AAOx3  HEENT: NCAT, EOMI, MMM, supple  Resp: Nonlabored  Heart: RRR  Abdomen: Soft, ND, NT, excess skin inferiorly with ptotic mons and complete hooding of umbilicus with erythema and sequelae of infections, moderate striae of lower abdomen, no palpable hernia, moderate diastasis  Extremities/MSK: no LE edema, no obvious deficits in ROM, upper thigh significant excess of skin with sequelae of recurrent folliculitis  Neuro: grossly intact with no obvious deficits  Skin: no obvious lesions or rashes        Valencia Barker,   Plastic and Reconstructive Surgery

## 2023-10-02 ENCOUNTER — PREP FOR PROCEDURE (OUTPATIENT)
Dept: PLASTIC SURGERY | Facility: CLINIC | Age: 38
End: 2023-10-02

## 2023-10-02 DIAGNOSIS — M79.3 PANNICULITIS: Primary | ICD-10-CM

## (undated) DEVICE — GLOVE SRG BIOGEL 7

## (undated) DEVICE — INTENDED FOR TISSUE SEPARATION, AND OTHER PROCEDURES THAT REQUIRE A SHARP SURGICAL BLADE TO PUNCTURE OR CUT.: Brand: BARD-PARKER SAFETY BLADES SIZE 11, STERILE

## (undated) DEVICE — DRAPE UTILITY

## (undated) DEVICE — IRRIG ENDO FLO TUBING

## (undated) DEVICE — SUT MONOCRYL 4-0 PS-2 18 IN Y496G

## (undated) DEVICE — ASTOUND STANDARD SURGICAL GOWN, XL: Brand: CONVERTORS

## (undated) DEVICE — WEBRIL 6 IN UNSTERILE

## (undated) DEVICE — HARMONIC ACE +7 LAPAROSCOPIC SHEARS ADVANCED HEMOSTASIS 5MM DIAMETER 36CM SHAFT LENGTH  FOR USE WITH GRAY HAND PIECE ONLY: Brand: HARMONIC ACE

## (undated) DEVICE — TUBING SMOKE EVAC W/FILTRATION DEVICE PLUMEPORT ACTIV

## (undated) DEVICE — 10 MM BABCOCKS WITH RATCHET HANDLES: Brand: ENDOPATH

## (undated) DEVICE — ENDOPATH PNEUMONEEDLE INSUFFLATION NEEDLES WITH LUER LOCK CONNECTORS 120MM: Brand: ENDOPATH

## (undated) DEVICE — SHEARS MONOPOL MINI 5MM X 31CM RATCHET HNDL

## (undated) DEVICE — TISSUE RETRIEVAL SYSTEM: Brand: INZII RETRIEVAL SYSTEM

## (undated) DEVICE — ENDOPATH XCEL BLADELESS TROCARS WITH STABILITY SLEEVES: Brand: ENDOPATH XCEL

## (undated) DEVICE — SUT ETHIBOND 2-0 SH/SH 36 IN X523H

## (undated) DEVICE — ADHESIVE SKIN CLSR DERMABOND NX

## (undated) DEVICE — SYRINGE 10ML LL

## (undated) DEVICE — TROCAR: Brand: KII FIOS FIRST ENTRY

## (undated) DEVICE — LIGAMAX 5 MM ENDOSCOPIC MULTIPLE CLIP APPLIER: Brand: LIGAMAX

## (undated) DEVICE — SUT VICRYL 0 18 IN J906G

## (undated) DEVICE — ELECTRODE LAP L WIRE E-Z CLEAN 33CM -0100

## (undated) DEVICE — [HIGH FLOW INSUFFLATOR,  DO NOT USE IF PACKAGE IS DAMAGED,  KEEP DRY,  KEEP AWAY FROM SUNLIGHT,  PROTECT FROM HEAT AND RADIOACTIVE SOURCES.]: Brand: PNEUMOSURE

## (undated) DEVICE — PMI DISPOSABLE PUNCTURE CLOSURE DEVICE / SUTURE GRASPER: Brand: PMI

## (undated) DEVICE — SURGICAL GOWN, XL SMARTSLEEVE: Brand: CONVERTORS

## (undated) DEVICE — SUT MONOCRYL 4-0 PS-2 27 IN Y426H

## (undated) DEVICE — BASIC DOUBLE BASIN 2-LF: Brand: MEDLINE INDUSTRIES, INC.

## (undated) DEVICE — STAPLER ENDO GIA ROTICULATOR 60-2.5

## (undated) DEVICE — NEEDLE SPINAL 20G X 3.5 LF

## (undated) DEVICE — ADHESIVE SKIN HIGH VISCOSITY EXOFIN 1ML

## (undated) DEVICE — TIBURON LAPAROTOMY DRAPE: Brand: CONVERTORS

## (undated) DEVICE — ANTI-FOG SOLUTION WITH FOAM PAD: Brand: DEVON

## (undated) DEVICE — SYRINGE 20ML LL

## (undated) DEVICE — GLOVE SRG BIOGEL ECLIPSE 7.5

## (undated) DEVICE — ROCKER SWITCH PENCIL HOLSTER: Brand: VALLEYLAB

## (undated) DEVICE — POWER SHELL SIGNIA

## (undated) DEVICE — VISUALIZATION SYSTEM: Brand: CLEARIFY

## (undated) DEVICE — TROCAR: Brand: KII® SLEEVE

## (undated) DEVICE — PERI/GYN PACK: Brand: CONVERTORS

## (undated) DEVICE — X-RAY DETECTABLE SPONGES,16 PLY: Brand: VISTEC

## (undated) DEVICE — CHLORAPREP HI-LITE 26ML ORANGE

## (undated) DEVICE — SUT PDS II 3-0 SH 27 IN Z316H

## (undated) DEVICE — GLOVE SRG BIOGEL 8

## (undated) DEVICE — SMOKE REMOVAL SYSTEM: Brand: BOEHRINGER® SMOKE REMOVAL SYSTEM

## (undated) DEVICE — TRUE CONTENT TO BE POPULATED AS PART OF REBRANDING: Brand: ARGYLE

## (undated) DEVICE — KERLIX BANDAGE ROLL: Brand: KERLIX

## (undated) DEVICE — MAYO STAND COVER: Brand: CONVERTORS

## (undated) DEVICE — LATEX FREE 10 FT  INSUFFLATION TUBING, COLDER CONNECTOR WITH 1 MICRON FILTER STERILE ONE TIME USE, 20 U: Brand: SURGICAL DIRECT

## (undated) DEVICE — SUT VICRYL 0 UR-6 27 IN J603H

## (undated) DEVICE — ELECTRODE LAP SPATULA STR E-Z CLEAN 33CM -0018

## (undated) DEVICE — SPONGE LAP 18 X 18 IN STRL RFD

## (undated) DEVICE — Device

## (undated) DEVICE — PACK GENERAL LF

## (undated) DEVICE — SUT CHROMIC 3-0 SH 27 IN G122H

## (undated) DEVICE — GLOVE INDICATOR PI UNDERGLOVE SZ 7.5 BLUE

## (undated) DEVICE — GLOVE INDICATOR PI UNDERGLOVE SZ 7 BLUE

## (undated) DEVICE — TIBURON GENERAL ENDOSCOPY DRAPE: Brand: CONVERTORS

## (undated) DEVICE — COVIDIEN ENDO GIA PURPLE (MED) RELOAD 60MM

## (undated) DEVICE — ENDOPATH 5MM CURVED SCISSORS WITH MONOPOLAR CAUTERY: Brand: ENDOPATH